# Patient Record
Sex: FEMALE | Race: BLACK OR AFRICAN AMERICAN | Employment: UNEMPLOYED | URBAN - METROPOLITAN AREA
[De-identification: names, ages, dates, MRNs, and addresses within clinical notes are randomized per-mention and may not be internally consistent; named-entity substitution may affect disease eponyms.]

---

## 2017-05-23 ENCOUNTER — HOSPITAL ENCOUNTER (OUTPATIENT)
Dept: GENERAL RADIOLOGY | Age: 67
Discharge: HOME | End: 2017-05-23
Payer: MEDICARE

## 2017-05-23 DIAGNOSIS — R06.02 SHORTNESS OF BREATH: ICD-10-CM

## 2017-05-23 PROCEDURE — 71020 XR CHEST PA LAT: CPT

## 2017-05-26 PROBLEM — E78.5 DYSLIPIDEMIA: Status: ACTIVE | Noted: 2017-05-26

## 2017-05-26 PROBLEM — E66.9 OBESITY: Status: ACTIVE | Noted: 2017-05-26

## 2017-05-26 PROBLEM — R07.89 CHEST DISCOMFORT: Status: ACTIVE | Noted: 2017-05-26

## 2017-07-06 ENCOUNTER — HOSPITAL ENCOUNTER (OUTPATIENT)
Dept: SURGERY | Age: 67
Discharge: HOME | End: 2017-07-06

## 2017-07-06 ENCOUNTER — HOSPITAL ENCOUNTER (OUTPATIENT)
Dept: INTERVENTIONAL RADIOLOGY/VASCULAR | Age: 67
Discharge: HOME | End: 2017-07-06
Attending: SURGERY
Payer: MEDICARE

## 2017-07-06 VITALS
DIASTOLIC BLOOD PRESSURE: 67 MMHG | OXYGEN SATURATION: 94 % | HEART RATE: 91 BPM | TEMPERATURE: 98.1 F | RESPIRATION RATE: 20 BRPM | SYSTOLIC BLOOD PRESSURE: 149 MMHG

## 2017-07-06 VITALS
WEIGHT: 181 LBS | BODY MASS INDEX: 36.49 KG/M2 | HEART RATE: 80 BPM | DIASTOLIC BLOOD PRESSURE: 50 MMHG | SYSTOLIC BLOOD PRESSURE: 148 MMHG | TEMPERATURE: 98 F | OXYGEN SATURATION: 95 % | RESPIRATION RATE: 18 BRPM | HEIGHT: 59 IN

## 2017-07-06 DIAGNOSIS — N18.6 ESRD (END STAGE RENAL DISEASE) (HCC): ICD-10-CM

## 2017-07-06 PROCEDURE — 74011250636 HC RX REV CODE- 250/636: Performed by: RADIOLOGY

## 2017-07-06 PROCEDURE — 77030031131 HC SUT MXN P COVD -B

## 2017-07-06 PROCEDURE — 77030002916 HC SUT ETHLN J&J -A

## 2017-07-06 PROCEDURE — 77030010507 HC ADH SKN DERMBND J&J -B

## 2017-07-06 PROCEDURE — 36558 INSERT TUNNELED CV CATH: CPT

## 2017-07-06 PROCEDURE — 74011000250 HC RX REV CODE- 250: Performed by: RADIOLOGY

## 2017-07-06 PROCEDURE — C1894 INTRO/SHEATH, NON-LASER: HCPCS

## 2017-07-06 PROCEDURE — 77030018719 HC DRSG PTCH ANTIMIC J&J -A

## 2017-07-06 PROCEDURE — C1750 CATH, HEMODIALYSIS,LONG-TERM: HCPCS

## 2017-07-06 RX ORDER — LIDOCAINE HYDROCHLORIDE AND EPINEPHRINE 15; 5 MG/ML; UG/ML
1.5 INJECTION, SOLUTION EPIDURAL ONCE
Status: COMPLETED | OUTPATIENT
Start: 2017-07-06 | End: 2017-07-06

## 2017-07-06 RX ORDER — HEPARIN SODIUM 200 [USP'U]/100ML
1000 INJECTION, SOLUTION INTRAVENOUS CONTINUOUS
Status: DISCONTINUED | OUTPATIENT
Start: 2017-07-06 | End: 2017-07-06

## 2017-07-06 RX ORDER — HEPARIN SODIUM 1000 [USP'U]/ML
1000-8000 INJECTION, SOLUTION INTRAVENOUS; SUBCUTANEOUS ONCE
Status: COMPLETED | OUTPATIENT
Start: 2017-07-06 | End: 2017-07-06

## 2017-07-06 RX ORDER — LIDOCAINE HYDROCHLORIDE 20 MG/ML
.5-2 INJECTION, SOLUTION INFILTRATION; PERINEURAL ONCE
Status: COMPLETED | OUTPATIENT
Start: 2017-07-06 | End: 2017-07-06

## 2017-07-06 RX ADMIN — LIDOCAINE HYDROCHLORIDE 215 MG: 20 INJECTION, SOLUTION INFILTRATION; PERINEURAL at 14:33

## 2017-07-06 RX ADMIN — SODIUM BICARBONATE 2 ML: 0.2 INJECTION, SOLUTION INTRAVENOUS at 14:33

## 2017-07-06 RX ADMIN — HEPARIN SODIUM 3000 UNITS: 1000 INJECTION, SOLUTION INTRAVENOUS; SUBCUTANEOUS at 14:47

## 2017-07-06 RX ADMIN — HEPARIN SODIUM 2000 UNITS: 200 INJECTION, SOLUTION INTRAVENOUS at 14:34

## 2017-07-06 RX ADMIN — LIDOCAINE HYDROCHLORIDE,EPINEPHRINE BITARTRATE 22.5 MG: 15; .005 INJECTION, SOLUTION EPIDURAL; INFILTRATION; INTRACAUDAL; PERINEURAL at 14:40

## 2017-07-06 NOTE — PERIOP NOTES
Patient verified name, , and surgery as listed in [facility]. TYPE  CASE:1B  Orders per surgeon: yes Received  Labs per surgeon:none  Labs per anesthesia protocol: none   EKG  :  Not needed    Patient provided with handouts including guide to surgery , transfusions, pain management and hand hygiene for the family and community. Pt verbalizes understanding of all pre-op instructions . Instructed that family must be present in building at all times. Nothing to eat or drink after midnight the night prior to surgery. One bar reji  Ist soap  and instructions given per hospital policy. Instructed patient to continue  previous medications as prescribed prior to surgery and  to take the following medications the day of surgery according to anesthesia guidelines : albuterol - bring, xanax, clonidine, gabapentin, omeprazole, tramadol       Original medication prescription bottles NOT visualized during patient appointment. Continue all previous medications unless otherwise directed. Instructed patient to hold  the following medications prior to surgery: none       Patient verbalized understanding of all instructions and provided all medical/health information to the best of their ability.

## 2017-07-06 NOTE — PERIOP NOTES
Call to [facility] for most recent ECHO - pt has ECHO rescheduled for 26th. Jeremy Torres to listen to heart  - ECHO  Must be done prior to surgery.

## 2017-07-06 NOTE — PROGRESS NOTES
Recovery period without difficulty. Pt alert and oriented and denies pain. Dressing is clean, dry, and intact. Reviewed discharge instructions with patient and patient aide, both verbalized understanding. Pt escorted to lobby discharge area via wheelchair. Vital signs  completed.

## 2017-07-06 NOTE — IP AVS SNAPSHOT
Sweta Smith 
 
 
 [address] 
[phone] Patient: Nicole Middleton MRN: [id number] WYM:[birth year] You are allergic to the following Allergen Reactions Vancomycin Anaphylaxis Recent Documentation Breastfeeding? OB Status Smoking Status No Postmenopausal Never Smoker Emergency Contacts Name Discharge Info Relation Home Work Mobile Marylene Nixon [5] [phone] About your hospitalization You were admitted on:  July 6, 2017 You last received care in the:  [facility] Radiology Specials You were discharged on:  July 6, 2017 Unit phone number:  [phone] Why you were hospitalized Your primary diagnosis was:  Not on File Providers Seen During Your Hospitalizations Provider Role Specialty Primary office phone Carolina Kauffman MD Attending Provider Vascular Surgery [phone] Your Primary Care Physician (PCP) Primary Care Physician Office Phone Office Fax Dev Ambboy [phone] Follow-up Information None Your Appointments Thursday July 13, 2017 ARTERIO VENOUS GRAFT THIGH REVISION with Carolina Kauffman MD  
[facility] ([address] [place] [address]) [address]  
[phone] Thursday July 27, 2017  9:30 AM EDT  
ECHOCARDIOGRAM with OVI ECHO 26 [facility] ([address] [place] [address]) [address]  
[address]  
[phone] Thursday August 03, 2017  9:30 AM EDT Office Visit with Sherry Hamm MD  
[facility] ([address] [place] [address]) [address] 
[address]  
[phone] Tuesday August 08, 2017 10:15 AM EDT Global Post Op with Carolina Kauffman MD  
[facility] [address] [place] [address] 187 [facility] [address]  
[phone] Current Discharge Medication List  
  
ASK your doctor about these medications Dose & Instructions Dispensing Information Comments Morning Noon Evening Bedtime  
 albuterol 90 mcg/actuation inhaler Commonly known as:  PROAIR HFA Your last dose was: Your next dose is:    
   
   
 Dose:  1 Puff Take 1 Puff by inhalation every six (6) hours as needed for Wheezing or Shortness of Breath. Quantity:  1 Inhaler Refills:  1  
     
   
   
   
  
 cloNIDine HCl 0.2 mg tablet Commonly known as:  CATAPRES Your last dose was: Your next dose is:    
   
   
 Dose:  0.2 mg  
0.2 mg two (2) times a day. Take day of surgery per anesthesia protocol. Refills:  5 DIALYVITE 800 PLUS D PO Your last dose was: Your next dose is: Take  by mouth. Refills:  0  
     
   
   
   
  
 gabapentin 100 mg capsule Commonly known as:  NEURONTIN Your last dose was: Your next dose is:    
   
   
 Dose:  100 mg Take 100 mg by mouth two (2) times a day. Take day of surgery per anesthesia protocol. Indications: \"take it when my nerves are bad\". Refills:  0  
     
   
   
   
  
 losartan 50 mg tablet Commonly known as:  COZAAR Your last dose was: Your next dose is:    
   
   
 Dose:  50 mg Take 50 mg by mouth daily. Indications: hypertension Refills:  0  
     
   
   
   
  
 omeprazole 20 mg capsule Commonly known as:  PRILOSEC Your last dose was: Your next dose is:    
   
   
 Dose:  20 mg Take 20 mg by mouth as needed. Take day of surgery per anesthesia protocol. Refills:  6 RENVELA 800 mg Tab tab Generic drug:  sevelamer carbonate Your last dose was: Your next dose is:    
   
   
 Dose:  800 mg Take 800 mg by mouth three (3) times daily (with meals). Refills:  6 SENSIPAR 30 mg tablet Generic drug:  cinacalcet Your last dose was: Your next dose is:    
   
   
 Dose:  30 mg Take 30 mg by mouth nightly. Refills:  0  
     
   
   
   
  
 traMADol 50 mg tablet Commonly known as:  ULTRAM  
   
Your last dose was: Your next dose is:    
   
   
 Dose:  50 mg Take 50 mg by mouth four (4) times daily as needed for Pain (for leg pain). Refills:  0  
     
   
   
   
  
 XANAX 0.5 mg tablet Generic drug:  ALPRAZolam  
   
Your last dose was: Your next dose is:    
   
   
 Dose:  0.5 mg Take 0.5 mg by mouth three (3) times daily as needed for Anxiety. Take day of surgery per anesthesia protocol. Refills:  0 Discharge Instructions Claire [address] Department of Interventional Radiology [facility] 
[phone] Office 
[phone] Fax Tunneled or Non Tunneled Catheter Discharge Instructions General Information: A catheter, either tunneled (permanent) or non-tunneled (temporary) catheter was inserted into your neck today for the purpose of Cancer treatment (apheresis) or dialysis. Your catheter will be used for the length of your apheresis, or until you get a fistula placed in surgery for dialysis. After this time, your catheter may be removed. You may return to our department for the catheter removal.  A non-tunneled catheter will exit at the neck. There will be three ports, two of which will be used for dialysis or apheresis. The one smaller port in the middle can be used like a regular IV. It ideally is used only for about two weeks. A tunneled catheter will exit lower down on the chest wall, and can be used for a longer period of time. There is no IV port on these. The tunneled catheter is used only for dialysis.   In case of emergencies only can drugs be given through these catheters and only with written permission from your doctor. Home Care Instructions: You can resume your regular diet. Do not drink alcohol, drive, or make any important legal decisions in the next 24 hours. Watch the site carefully for signs of infection, like fever, drainage, redness, and/or swelling. Your catheter should be \"packed\" with heparin after each use or at least once a week if it is not being used. This \"packing\" should only be done by nurses experienced with caring for this type of catheter. You may shower in 24 hours, but you need to cover the catheter with plastic wrap and tape to keep it dry while you are showering. Keep the site clean, dry, and protected. Do not immerse yourself in water like in the case of tub baths or swimming as long as you have the catheter. Call If: 
 You should call your Physician and/or the Radiology Nurse if you have any signs of infection, shortness of breath, or if the dressing should come off or become moist.  You will be instructed on where to go for a new dressing. You should not have to change the dressings yourself, as that will be done by the nurses who access the catheter. Follow-Up Instructions:  Please see your ordering doctor as he/she has requested. To Reach Us: If you have any questions about your procedure, please call the Interventional Radiology department at [phone]. After business hours (5pm) and weekends, call the answering service at [phone] and ask for the Radiologist on call to be paged. Patient Signature: 
Date: 7/6/2017 Discharging Nurse: Alok Abebe RN Interventional Radiology General Nurse Discharge After general anesthesia or intravenous sedation, for 24 hours or while taking prescription Narcotics: · Limit your activities · Do not drive and operate hazardous machinery · Do not make important personal or business decisions · Do  not drink alcoholic beverages · If you have not urinated within 8 hours after discharge, please contact your surgeon on call. * Please give a list of your current medications to your Primary Care Provider. * Please update this list whenever your medications are discontinued, doses are 
   changed, or new medications (including over-the-counter products) are added. * Please carry medication information at all times in case of emergency situations. These are general instructions for a healthy lifestyle: No smoking/ No tobacco products/ Avoid exposure to second hand smoke Surgeon General's Warning:  Quitting smoking now greatly reduces serious risk to your health. Obesity, smoking, and sedentary lifestyle greatly increases your risk for illness A healthy diet, regular physical exercise & weight monitoring are important for maintaining a healthy lifestyle You may be retaining fluid if you have a history of heart failure or if you experience any of the following symptoms:  Weight gain of 3 pounds or more overnight or 5 pounds in a week, increased swelling in our hands or feet or shortness of breath while lying flat in bed. Please call your doctor as soon as you notice any of these symptoms; do not wait until your next office visit. Recognize signs and symptoms of STROKE: 
F-face looks uneven A-arms unable to move or move unevenly S-speech slurred or non-existent T-time-call 911 as soon as signs and symptoms begin-DO NOT go Back to bed or wait to see if you get better-TIME IS BRAIN. Discharge Orders None Introducing \[facility]\"" & HEALTH SERVICES! Jeannie Sun introduces Whisk patient portal. Now you can access parts of your medical record, email your doctor's office, and request medication refills online. 1. In your internet browser, go to [email or web address] 2. Click on the First Time User? Click Here link in the Sign In box. You will see the New Member Sign Up page. 3. Enter your Robot[employer] Access Code exactly as it appears below. You will not need to use this code after youve completed the sign-up process. If you do not sign up before the expiration date, you must request a new code. · Robot[employer] Access Code: [email or web address] Expires: 8/21/2017  2:06 PM 
 
4. Enter the last four digits of your Social Security Number (xxxx) and Date of Birth (mm/dd/yyyy) as indicated and click Submit. You will be taken to the next sign-up page. 5. Create a Robotoki ID. This will be your Robotoki login ID and cannot be changed, so think of one that is secure and easy to remember. 6. Create a Robotoki password. You can change your password at any time. 7. Enter your Password Reset Question and Answer. This can be used at a later time if you forget your password. 8. Enter your e-mail address. You will receive e-mail notification when new information is available in [address] 9. Click Sign Up. You can now view and download portions of your medical record. 10. Click the Download Summary menu link to download a portable copy of your medical information. If you have questions, please visit the Frequently Asked Questions section of the [employer] website. Remember, Robotoki is NOT to be used for urgent needs. For medical emergencies, dial 911. Now available from your iPhone and Android! General Information Please provide this summary of care documentation to your next provider. Patient Signature:  ____________________________________________________________ Date:  ____________________________________________________________  
  
Merline Galaviz Provider Signature:  ____________________________________________________________ Date:  ____________________________________________________________

## 2017-07-06 NOTE — PERIOP NOTES
Blood refusal signed and on chart. Pt reports she has dialysis M W F from 1000 to 1700 and she only has an aide with her TR from [phone], which is a must to have this appointment. LM with surgery scheduling for return call     Speedy Sebastian returned call and said he would call [facility] cardiology to attempt to get sooner appointment with [facility].

## 2017-07-06 NOTE — DISCHARGE INSTRUCTIONS
[address]  Department of Interventional Radiology  [facility]  [phone] Office  [phone] Fax    Tunneled or Non Tunneled Catheter Discharge Instructions    General Information:   A catheter, either tunneled (permanent) or non-tunneled (temporary) catheter was inserted into your neck today for the purpose of Cancer treatment (apheresis) or dialysis. Your catheter will be used for the length of your apheresis, or until you get a fistula placed in surgery for dialysis. After this time, your catheter may be removed. You may return to our department for the catheter removal.  A non-tunneled catheter will exit at the neck. There will be three ports, two of which will be used for dialysis or apheresis. The one smaller port in the middle can be used like a regular IV. It ideally is used only for about two weeks. A tunneled catheter will exit lower down on the chest wall, and can be used for a longer period of time. There is no IV port on these. The tunneled catheter is used only for dialysis. In case of emergencies only can drugs be given through these catheters and only with written permission from your doctor. Home Care Instructions: You can resume your regular diet. Do not drink alcohol, drive, or make any important legal decisions in the next 24 hours. Watch the site carefully for signs of infection, like fever, drainage, redness, and/or swelling. Your catheter should be \"packed\" with heparin after each use or at least once a week if it is not being used. This \"packing\" should only be done by nurses experienced with caring for this type of catheter. You may shower in 24 hours, but you need to cover the catheter with plastic wrap and tape to keep it dry while you are showering. Keep the site clean, dry, and protected. Do not immerse yourself in water like in the case of tub baths or swimming as long as you have the catheter. Call If:   You should call your Physician and/or the Radiology Nurse if you have any signs of infection, shortness of breath, or if the dressing should come off or become moist.  You will be instructed on where to go for a new dressing. You should not have to change the dressings yourself, as that will be done by the nurses who access the catheter. Follow-Up Instructions:  Please see your ordering doctor as he/she has requested. To Reach Us: If you have any questions about your procedure, please call the Interventional Radiology department at [phone]. After business hours (5pm) and weekends, call the answering service at [phone] and ask for the Radiologist on call to be paged. Patient Signature:  Date: 7/6/2017  Discharging Nurse: Ad Yates RN    Interventional Radiology General Nurse Discharge    After general anesthesia or intravenous sedation, for 24 hours or while taking prescription Narcotics:  · Limit your activities  · Do not drive and operate hazardous machinery  · Do not make important personal or business decisions  · Do  not drink alcoholic beverages  · If you have not urinated within 8 hours after discharge, please contact your surgeon on call. * Please give a list of your current medications to your Primary Care Provider. * Please update this list whenever your medications are discontinued, doses are     changed, or new medications (including over-the-counter products) are added. * Please carry medication information at all times in case of emergency situations. These are general instructions for a healthy lifestyle:    No smoking/ No tobacco products/ Avoid exposure to second hand smoke  Surgeon General's Warning:  Quitting smoking now greatly reduces serious risk to your health.     Obesity, smoking, and sedentary lifestyle greatly increases your risk for illness  A healthy diet, regular physical exercise & weight monitoring are important for maintaining a healthy lifestyle    You may be retaining fluid if you have a history of heart failure or if you experience any of the following symptoms:  Weight gain of 3 pounds or more overnight or 5 pounds in a week, increased swelling in our hands or feet or shortness of breath while lying flat in bed. Please call your doctor as soon as you notice any of these symptoms; do not wait until your next office visit. Recognize signs and symptoms of STROKE:  F-face looks uneven    A-arms unable to move or move unevenly    S-speech slurred or non-existent    T-time-call 911 as soon as signs and symptoms begin-DO NOT go       Back to bed or wait to see if you get better-TIME IS BRAIN.

## 2017-07-06 NOTE — PROGRESS NOTES
TRANSFER - OUT REPORT:    Verbal report given to Katia RN(name) on [employer]  being transferred to Ir recovery(unit) for routine progression of care       Report consisted of patients Situation, Background, Assessment and   Recommendations(SBAR). Information from the following report(s) SBAR, Procedure Summary and MAR was reviewed with the receiving nurse. Lines:       Opportunity for questions and clarification was provided.

## 2017-07-06 NOTE — PROGRESS NOTES
TRANSFER - IN REPORT:    Verbal report received from [facility] RN(name) on [employer]  being received from IR(unit) for routine progression of care      Report consisted of patients Situation, Background, Assessment and   Recommendations(SBAR). Information from the following report(s) Procedure Summary and MAR was reviewed with the receiving nurse. Opportunity for questions and clarification was provided. Assessment completed upon patients arrival to unit and care assumed.

## 2017-07-06 NOTE — PERIOP NOTES
Geovanni Durbin call to [facility] to inform he will call [facility] to see if he can get the pt a sooner ECHO

## 2017-08-03 ENCOUNTER — HOSPITAL ENCOUNTER (OUTPATIENT)
Dept: LAB | Age: 67
Discharge: HOME | End: 2017-08-03
Attending: INTERNAL MEDICINE
Payer: MEDICARE

## 2017-08-03 DIAGNOSIS — I06.0 RHEUMATIC AORTIC STENOSIS: ICD-10-CM

## 2017-08-03 DIAGNOSIS — I05.1 RHEUMATIC MITRAL REGURGITATION: ICD-10-CM

## 2017-08-03 PROBLEM — R06.02 SHORTNESS OF BREATH: Status: ACTIVE | Noted: 2017-08-03

## 2017-08-03 LAB
ANION GAP BLD CALC-SCNC: 9 MMOL/L
BASOPHILS # BLD AUTO: 0 K/UL (ref 0–0.2)
BASOPHILS # BLD: 0 % (ref 0–2)
BUN SERPL-MCNC: 30 MG/DL (ref 8–23)
CALCIUM SERPL-MCNC: 10.2 MG/DL (ref 8.3–10.4)
CHLORIDE SERPL-SCNC: 100 MMOL/L (ref 98–107)
CO2 SERPL-SCNC: 30 MMOL/L (ref 21–32)
CREAT SERPL-MCNC: 8.4 MG/DL (ref 0.6–1)
DIFFERENTIAL METHOD BLD: ABNORMAL
EOSINOPHIL # BLD: 0.5 K/UL (ref 0–0.8)
EOSINOPHIL NFR BLD: 8 % (ref 0.5–7.8)
ERYTHROCYTE [DISTWIDTH] IN BLOOD BY AUTOMATED COUNT: 13.9 % (ref 11.9–14.6)
GLUCOSE SERPL-MCNC: 88 MG/DL (ref 65–100)
HCT VFR BLD AUTO: 37.4 % (ref 35.8–46.3)
HGB BLD-MCNC: 12.1 G/DL (ref 11.7–15.4)
INR PPP: 1 (ref 0.9–1.2)
LYMPHOCYTES # BLD AUTO: 24 % (ref 13–44)
LYMPHOCYTES # BLD: 1.4 K/UL (ref 0.5–4.6)
MCH RBC QN AUTO: 28.7 PG (ref 26.1–32.9)
MCHC RBC AUTO-ENTMCNC: 32.4 G/DL (ref 31.4–35)
MCV RBC AUTO: 88.6 FL (ref 79.6–97.8)
MONOCYTES # BLD: 0.7 K/UL (ref 0.1–1.3)
MONOCYTES NFR BLD AUTO: 11 % (ref 4–12)
NEUTS SEG # BLD: 3.4 K/UL (ref 1.7–8.2)
NEUTS SEG NFR BLD AUTO: 57 % (ref 43–78)
PLATELET # BLD AUTO: 112 K/UL (ref 150–450)
PLATELET COMMENTS,PCOM: ABNORMAL
PMV BLD AUTO: 9.2 FL (ref 10.8–14.1)
POTASSIUM SERPL-SCNC: 5 MMOL/L (ref 3.5–5.1)
PROTHROMBIN TIME: 10.3 SEC (ref 9.6–12)
RBC # BLD AUTO: 4.22 M/UL (ref 4.05–5.25)
RBC MORPH BLD: ABNORMAL
SODIUM SERPL-SCNC: 139 MMOL/L (ref 136–145)
WBC # BLD AUTO: 6 K/UL (ref 4.3–11.1)
WBC MORPH BLD: ABNORMAL

## 2017-08-03 PROCEDURE — 85025 COMPLETE CBC W/AUTO DIFF WBC: CPT | Performed by: INTERNAL MEDICINE

## 2017-08-03 PROCEDURE — 85610 PROTHROMBIN TIME: CPT | Performed by: INTERNAL MEDICINE

## 2017-08-03 PROCEDURE — 80048 BASIC METABOLIC PNL TOTAL CA: CPT | Performed by: INTERNAL MEDICINE

## 2017-08-17 ENCOUNTER — HOSPITAL ENCOUNTER (OUTPATIENT)
Dept: CARDIAC CATH/INVASIVE PROCEDURES | Age: 67
Discharge: HOME | End: 2017-08-17
Attending: INTERNAL MEDICINE | Admitting: INTERNAL MEDICINE
Payer: MEDICARE

## 2017-08-17 VITALS
OXYGEN SATURATION: 100 % | SYSTOLIC BLOOD PRESSURE: 116 MMHG | HEIGHT: 59 IN | RESPIRATION RATE: 16 BRPM | DIASTOLIC BLOOD PRESSURE: 56 MMHG | WEIGHT: 180 LBS | HEART RATE: 72 BPM | TEMPERATURE: 97.6 F | BODY MASS INDEX: 36.29 KG/M2

## 2017-08-17 LAB
ATRIAL RATE: 70 BPM
CALCULATED P AXIS, ECG09: 35 DEGREES
CALCULATED R AXIS, ECG10: -24 DEGREES
CALCULATED T AXIS, ECG11: 76 DEGREES
DIAGNOSIS, 93000: NORMAL
P-R INTERVAL, ECG05: 222 MS
Q-T INTERVAL, ECG07: 442 MS
QRS DURATION, ECG06: 90 MS
QTC CALCULATION (BEZET), ECG08: 477 MS
VENTRICULAR RATE, ECG03: 70 BPM

## 2017-08-17 PROCEDURE — C1894 INTRO/SHEATH, NON-LASER: HCPCS

## 2017-08-17 PROCEDURE — 77030002888 HC SUT CHRMC J&J -A

## 2017-08-17 PROCEDURE — 99153 MOD SED SAME PHYS/QHP EA: CPT

## 2017-08-17 PROCEDURE — 77030004559 HC CATH ANGI DX SUPT CARD -B

## 2017-08-17 PROCEDURE — 99152 MOD SED SAME PHYS/QHP 5/>YRS: CPT

## 2017-08-17 PROCEDURE — C1760 CLOSURE DEV, VASC: HCPCS

## 2017-08-17 PROCEDURE — 93460 R&L HRT ART/VENTRICLE ANGIO: CPT

## 2017-08-17 PROCEDURE — C1769 GUIDE WIRE: HCPCS

## 2017-08-17 PROCEDURE — 74011250636 HC RX REV CODE- 250/636: Performed by: INTERNAL MEDICINE

## 2017-08-17 PROCEDURE — 74011000250 HC RX REV CODE- 250: Performed by: INTERNAL MEDICINE

## 2017-08-17 PROCEDURE — 93005 ELECTROCARDIOGRAM TRACING: CPT | Performed by: INTERNAL MEDICINE

## 2017-08-17 PROCEDURE — 74011250637 HC RX REV CODE- 250/637: Performed by: INTERNAL MEDICINE

## 2017-08-17 PROCEDURE — 74011250636 HC RX REV CODE- 250/636

## 2017-08-17 PROCEDURE — 74011636320 HC RX REV CODE- 636/320: Performed by: INTERNAL MEDICINE

## 2017-08-17 PROCEDURE — C1887 CATHETER, GUIDING: HCPCS

## 2017-08-17 PROCEDURE — 77030013687 HC GD NDL BARD -B

## 2017-08-17 PROCEDURE — 74011000258 HC RX REV CODE- 258: Performed by: INTERNAL MEDICINE

## 2017-08-17 PROCEDURE — 77030004534 HC CATH ANGI DX INFN CARD -A

## 2017-08-17 PROCEDURE — 76937 US GUIDE VASCULAR ACCESS: CPT

## 2017-08-17 PROCEDURE — 77030004558 HC CATH ANGI DX SUPR TORQ CARD -A

## 2017-08-17 PROCEDURE — C1751 CATH, INF, PER/CENT/MIDLINE: HCPCS

## 2017-08-17 RX ORDER — HEPARIN SODIUM 200 [USP'U]/100ML
3 INJECTION, SOLUTION INTRAVENOUS CONTINUOUS
Status: DISCONTINUED | OUTPATIENT
Start: 2017-08-17 | End: 2017-08-17 | Stop reason: HOSPADM

## 2017-08-17 RX ORDER — ONDANSETRON 2 MG/ML
4 INJECTION INTRAMUSCULAR; INTRAVENOUS
Status: DISCONTINUED | OUTPATIENT
Start: 2017-08-17 | End: 2017-08-17 | Stop reason: HOSPADM

## 2017-08-17 RX ORDER — ACETAMINOPHEN 325 MG/1
650 TABLET ORAL
Status: DISCONTINUED | OUTPATIENT
Start: 2017-08-17 | End: 2017-08-17 | Stop reason: HOSPADM

## 2017-08-17 RX ORDER — HYDROCODONE BITARTRATE AND ACETAMINOPHEN 5; 325 MG/1; MG/1
1 TABLET ORAL
Status: DISCONTINUED | OUTPATIENT
Start: 2017-08-17 | End: 2017-08-17 | Stop reason: HOSPADM

## 2017-08-17 RX ORDER — FENTANYL CITRATE 50 UG/ML
25-100 INJECTION, SOLUTION INTRAMUSCULAR; INTRAVENOUS
Status: DISCONTINUED | OUTPATIENT
Start: 2017-08-17 | End: 2017-08-17 | Stop reason: HOSPADM

## 2017-08-17 RX ORDER — LIDOCAINE HYDROCHLORIDE 20 MG/ML
1-20 INJECTION, SOLUTION INFILTRATION; PERINEURAL
Status: DISCONTINUED | OUTPATIENT
Start: 2017-08-17 | End: 2017-08-17 | Stop reason: HOSPADM

## 2017-08-17 RX ORDER — SODIUM CHLORIDE 9 MG/ML
75 INJECTION, SOLUTION INTRAVENOUS CONTINUOUS
Status: DISCONTINUED | OUTPATIENT
Start: 2017-08-17 | End: 2017-08-17 | Stop reason: HOSPADM

## 2017-08-17 RX ORDER — SODIUM CHLORIDE 0.9 % (FLUSH) 0.9 %
5-10 SYRINGE (ML) INJECTION EVERY 8 HOURS
Status: DISCONTINUED | OUTPATIENT
Start: 2017-08-17 | End: 2017-08-17 | Stop reason: HOSPADM

## 2017-08-17 RX ORDER — GUAIFENESIN 100 MG/5ML
81-324 LIQUID (ML) ORAL ONCE
Status: COMPLETED | OUTPATIENT
Start: 2017-08-17 | End: 2017-08-17

## 2017-08-17 RX ORDER — SODIUM CHLORIDE 0.9 % (FLUSH) 0.9 %
5-10 SYRINGE (ML) INJECTION AS NEEDED
Status: DISCONTINUED | OUTPATIENT
Start: 2017-08-17 | End: 2017-08-17 | Stop reason: HOSPADM

## 2017-08-17 RX ORDER — DIAZEPAM 5 MG/1
5 TABLET ORAL ONCE
Status: DISCONTINUED | OUTPATIENT
Start: 2017-08-17 | End: 2017-08-17 | Stop reason: HOSPADM

## 2017-08-17 RX ORDER — MIDAZOLAM HYDROCHLORIDE 1 MG/ML
.5-5 INJECTION, SOLUTION INTRAMUSCULAR; INTRAVENOUS
Status: DISCONTINUED | OUTPATIENT
Start: 2017-08-17 | End: 2017-08-17 | Stop reason: HOSPADM

## 2017-08-17 RX ADMIN — HEPARIN SODIUM 3 ML/HR: 200 INJECTION, SOLUTION INTRAVENOUS at 13:06

## 2017-08-17 RX ADMIN — IOPAMIDOL 90 ML: 755 INJECTION, SOLUTION INTRAVENOUS at 13:50

## 2017-08-17 RX ADMIN — MIDAZOLAM HYDROCHLORIDE 1 MG: 1 INJECTION, SOLUTION INTRAMUSCULAR; INTRAVENOUS at 13:48

## 2017-08-17 RX ADMIN — SODIUM CHLORIDE 75 ML/HR: 900 INJECTION, SOLUTION INTRAVENOUS at 12:00

## 2017-08-17 RX ADMIN — FENTANYL CITRATE 25 MCG: 50 INJECTION, SOLUTION INTRAMUSCULAR; INTRAVENOUS at 13:23

## 2017-08-17 RX ADMIN — LIDOCAINE HYDROCHLORIDE 140 MG: 20 INJECTION, SOLUTION INFILTRATION; PERINEURAL at 13:16

## 2017-08-17 RX ADMIN — FENTANYL CITRATE 25 MCG: 50 INJECTION, SOLUTION INTRAMUSCULAR; INTRAVENOUS at 13:40

## 2017-08-17 RX ADMIN — FENTANYL CITRATE 25 MCG: 50 INJECTION, SOLUTION INTRAMUSCULAR; INTRAVENOUS at 13:48

## 2017-08-17 RX ADMIN — MIDAZOLAM HYDROCHLORIDE 2 MG: 1 INJECTION, SOLUTION INTRAMUSCULAR; INTRAVENOUS at 13:23

## 2017-08-17 RX ADMIN — ASPIRIN 81 MG 324 MG: 81 TABLET ORAL at 12:13

## 2017-08-17 RX ADMIN — MIDAZOLAM HYDROCHLORIDE 1 MG: 1 INJECTION, SOLUTION INTRAMUSCULAR; INTRAVENOUS at 13:40

## 2017-08-17 NOTE — PROGRESS NOTES
Report received from Yuliana Prince, Cath Lab RN. Procedural findings communicated. Intra procedural  medication administration reviewed. Progression of care discussed.      Patient received into [address] [facility] 5 post sheath removal. 7fr venous sheath in place    Access site without bleeding or swelling yes    Dressing dry and intact yes    Patient instructed to limit movement to right lower extremity    Routine post procedural vital signs and site assessment initiated yes

## 2017-08-17 NOTE — IP AVS SNAPSHOT
[address] 
[phone] Patient: Kolby Finch MRN: [id number] WN Discharge Summary 2017 Kolby Finch MRN[de-identified]  [id number] Admission Information Provider Pager Service Admission Date Expected D/C Date Shireen Dozier MD  CARDIAC CATH LAB 2017 Actual LOS Patient Class 0 days OUTPATIENT Follow-up Information Follow up With Details Comments Contact Info Deng Thompson MD On 2017 Follow up with Dr. Mcarthur Shown at [facility]iology on  at 1245pm. Please call [phone] if any issues with date or time of appointment. [address] [place] [address] [address] 
[phone] Current Discharge Medication List  
  
ASK your doctor about these medications Dose & Instructions Dispensing Information Comments Morning Noon Evening Bedtime  
 albuterol 90 mcg/actuation inhaler Commonly known as:  PROAIR HFA Your last dose was: Your next dose is:    
   
   
 Dose:  1 Puff Take 1 Puff by inhalation every six (6) hours as needed for Wheezing or Shortness of Breath. Quantity:  1 Inhaler Refills:  1  
     
   
   
   
  
 cloNIDine HCl 0.2 mg tablet Commonly known as:  CATAPRES Your last dose was: Your next dose is:    
   
   
 Dose:  0.2 mg  
0.2 mg two (2) times a day. Refills:  5 DIALYVITE 800 PLUS D PO Your last dose was: Your next dose is: Take  by mouth. Refills:  0  
     
   
   
   
  
 gabapentin 100 mg capsule Commonly known as:  NEURONTIN Your last dose was: Your next dose is:    
   
   
 Dose:  100 mg Take 100 mg by mouth two (2) times a day. Indications: \"take it when my nerves are bad\". Refills:  0  
     
   
   
   
  
 losartan 50 mg tablet Commonly known as:  COZAAR  
   
 Your last dose was: Your next dose is:    
   
   
 Dose:  50 mg Take 50 mg by mouth daily. Indications: hypertension Refills:  0  
     
   
   
   
  
 omeprazole 20 mg capsule Commonly known as:  PRILOSEC Your last dose was: Your next dose is:    
   
   
 Dose:  20 mg Take 20 mg by mouth as needed. Refills:  6 RENVELA 800 mg Tab tab Generic drug:  sevelamer carbonate Your last dose was: Your next dose is:    
   
   
 Dose:  800 mg Take 800 mg by mouth three (3) times daily (with meals). Refills:  6 SENSIPAR 30 mg tablet Generic drug:  cinacalcet Your last dose was: Your next dose is:    
   
   
 Dose:  30 mg Take 30 mg by mouth nightly. Refills:  0  
     
   
   
   
  
 traMADol 50 mg tablet Commonly known as:  ULTRAM  
   
Your last dose was: Your next dose is:    
   
   
 Dose:  50 mg Take 50 mg by mouth four (4) times daily as needed for Pain (for leg pain). Refills:  0  
     
   
   
   
  
 XANAX 0.5 mg tablet Generic drug:  ALPRAZolam  
   
Your last dose was: Your next dose is:    
   
   
 Dose:  0.5 mg Take 0.5 mg by mouth three (3) times daily as needed for Anxiety. Refills:  0 General Information Please provide this summary of care documentation to your next provider. Allergies High:  Vancomycin Current Immunizations  Reviewed on 5/26/2016 Name Date Influenza Vaccine 9/26/2015 Pneumococcal Conjugate (PCV-13) 4/28/2016 TB Skin Test (PPD) Intradermal 5/14/2015 Discharge Instructions Discharge Instructions HEART CATHETERIZATION/ANGIOGRAPHY DISCHARGE INSTRUCTIONS 1. Check puncture site frequently for swelling or bleeding.  If there is any bleeding, lie down and apply pressure over the area with a clean towel or washcloth and call 911. Notify your doctor for any redness, swelling, drainage, or oozing from the puncture site. Notify your doctor for any fever or chills. 2. If the extremity becomes cold, numb, or painful call [facility] at [phone]. 
3. Activity should be limited for the next 48 hours. Climb stairs as little as possible and avoid any stooping, bending, or strenuous activity for 48 hours. No heavy lifting (anything over 10 pounds) for 3 days. 4. You may resume your usual diet. Drink more fluids than usual. 
5. Have a responsible person drive you home and stay with you for at least 24 hours after your heart catheterization/angiography. 6. You may remove bandage from your Right groin in 24 hours. You may shower in 24 hours. No tub baths, hot tubs, or swimming for 1 week. Do not place any lotions, creams, powders, or ointments over puncture site for 1 week. You may place a clean band-aid over the puncture site each day for 5 days. Change daily. Follow up with Dr. Oleg Shaikh at [facility] on August 30th at 1245pm. Please call [phone] if any issues with date or time of appointment. I have read the above instructions and have had the opportunity to ask questions. Patient: ________________________   Date: 8/17/2017 Witness: _______________________   Date: 8/17/2017 Discharge Orders None  
  
` Patient Signature:  ____________________________________________________________ Date:  ____________________________________________________________  
  
 Merline Pane Provider Signature:  ____________________________________________________________ Date:  ____________________________________________________________

## 2017-08-17 NOTE — PROCEDURES
Brief Cardiac Procedure Note    Patient: Adolph Ramirez MRN: [id number]  SSN: [id number]    YOB: 1950  Age: 75 y.o. Sex: female      Date of Procedure: 8/17/2017     Pre-procedure Diagnosis: Aortic Stenosis    Post-procedure Diagnosis: Aortic Stenosis    Procedure: Right and Left Heart Catheterization    Brief Description of Procedure: As above    Performed By: Salbador Jarrett MD     Assistants: None    Anesthesia: Moderate Sedation    Estimated Blood Loss: Less than 10 mL      Specimens: None    Implants: None    Findings: Severe pulmonary HTN. Severe AS. 3+ MR. Distal LAD stenosis. EF 65%. Complications: None    Recommendations: Continue medical therapy.     Signed By: Salbador Jarrett MD     August 17, 2017

## 2017-08-17 NOTE — PROCEDURES
[address]       Name:  Chau Andrade   MR#:  [id number]   :  1950   Account #:  [de-identified]   Date of Adm:  2017       DATE OF PROCEDURE: 2017    PROCEDURES   1. Right heart catheterization with hemodynamic assessment,   pulse oximetry run, and thermodilution cardiac outputs. 2. Left heart catheterization, selective coronary arteriography   and left ventriculogram.    INDICATION: Multivalvular disease with significant aortic   stenosis, mitral regurgitation, tricuspid regurgitation. Significant pulmonary hypertension noted. Preoperative cardiac   catheterization requested by Dr. Darryle Hint. TECHNICAL FACTORS: After informed consent was obtained, the   patient was brought to the cardiac catheterization lab. The   right femoral area was prepped and draped in usual sterile   fashion. Utilizing a [facility] ultrasound, the right common   femoral artery and right common femoral vein were identified. Under ultrasound guidance, a 6-[place] arterial sheath was placed   in the right common femoral artery and a 7-[place]ian venous sheath   was placed in the right common femoral vein. At this point, a   [place]-Stanislav catheter was advanced and ultimately placed in the   wedge position. Hemodynamic assessment, pulse oximetry run, and   thermodilution cardiac outputs were obtained. Pullback gradients   across the pulmonic and tricuspid valves were obtained. At this time, the attention was then turned to the left heart   catheterization. Abdon Paul catheter was used to selectively engage   the ostium of the left main coronary artery and a 3DRC catheter   was used to selectively engage the ostium of the right coronary   artery. Selective injections in various projections were   performed. Pigtail catheter was used to cross the aortic valve   and enter the left ventricle. Hemodynamic measurements and left   ventriculogram were obtained.  Left ventricular aortic pressure   gradient was obtained by pullback technique. CONTRAST: Isovue 90. RIGHT HEART HEMODYNAMICS   1. Right atrial pressure showed an A wave of 13, V wave of 12   with a mean of 11 with a right atrial saturation of 73%. 2. Right ventricular pressure was 78/15 with a right ventricular   saturation of 74%. 3. Pulmonary artery pressure was 78/34 with a mean of 51 with a   pulmonary artery saturation of 73%. 4. Thermodilution cardiac output was 4.93 with a cardiac index   2.8.   5. There was no significant gradient across the pulmonic or   tricuspid valves. LEFT HEART HEMODYNAMICS   1. Aortic pressure was 129/51. 2. Left ventricular end-diastolic pressure was 27.   3. There is a mean gradient across the aortic valve of 53 mmHg   with a calculated aortic valve area of 0.53 cm2 consistent with   severe aortic stenosis. ANGIOGRAPHIC RESULTS:   1. Left main coronary artery: Large caliber vessel. Angiographically normal.   2. LAD: Medium caliber vessel, 20% mid stenosis. There is a   high-grade 80% distal stenosis. 3. First diagonal artery: Small to medium caliber vessel, 20%   proximal stenosis. 4. Second diagonal artery: Small caliber vessel. Patent. 5. Left circumflex: Medium caliber, nondominant vessel. It   contains a 30% mid stenosis. 6. First obtuse marginal artery: Medium caliber. Patent. 7. Right coronary artery: Medium caliber, dominant vessel. Moderately calcified, 30% mid stenosis. 8. Right PDA: Small to medium caliber vessel. Patent. 9 Right posterolateral branch: Medium caliber vessel. Patent. 10. Left ventriculogram performed in [place] projection shows normal   left ventricular systolic function, EF 53%. There is 2-3+ mitral   regurgitation noted. There is mitral annular calcification and   aortic valve calcification. CONCLUSIONS   1. Severe pulmonary hypertension.    2. One vessel coronary artery disease involving the distal left anterior descending. 3. Normal left ventricular systolic function. 4. A 2-3+ mitral regurgitation. 5. Severe pulmonary hypertension. PLAN: I discussed with Dr. Rocco Carlin. We will have her   followup in office in 1-2 weeks to discuss options in regards to   multivalvular surgery and potential bypass to distal LAD.         MD RICH Lema / Dilan Monday   D:  08/17/2017   17:33   T:  08/17/2017   17:56   Job #:  [id number]

## 2017-08-17 NOTE — PROGRESS NOTES
7FR sheath removed from RFV. Manual pressure held for 10 minutes until hemostasis achieved. No bleeding or hematoma noted afterwards. Sterile dressing placed. Pt instructed to keep right leg straight and to remain flat. Pt verbalized understanding of post procedural instructions. Call bell within reach. Will continue to monitor. Hemostasis achieved at 1520.

## 2017-08-17 NOTE — DISCHARGE INSTRUCTIONS
HEART CATHETERIZATION/ANGIOGRAPHY DISCHARGE INSTRUCTIONS    1. Check puncture site frequently for swelling or bleeding. If there is any bleeding, lie down and apply pressure over the area with a clean towel or washcloth and call 911. Notify your doctor for any redness, swelling, drainage, or oozing from the puncture site. Notify your doctor for any fever or chills. 2. If the extremity becomes cold, numb, or painful call [facility] at [phone].  3. Activity should be limited for the next 48 hours. Climb stairs as little as possible and avoid any stooping, bending, or strenuous activity for 48 hours. No heavy lifting (anything over 10 pounds) for 3 days. 4. You may resume your usual diet. Drink more fluids than usual.  5. Have a responsible person drive you home and stay with you for at least 24 hours after your heart catheterization/angiography. 6. You may remove bandage from your Right groin in 24 hours. You may shower in 24 hours. No tub baths, hot tubs, or swimming for 1 week. Do not place any lotions, creams, powders, or ointments over puncture site for 1 week. You may place a clean band-aid over the puncture site each day for 5 days. Change daily. Follow up with Dr. Desirae Senior at [facility] Card[facility] on August 30th at 1245pm. Please call [phone] if any issues with date or time of appointment. I have read the above instructions and have had the opportunity to ask questions.       Patient: ________________________   Date: 8/17/2017    Witness: _______________________   Date: 8/17/2017

## 2017-08-17 NOTE — PROGRESS NOTES
Patient received to [address] room # 5  Via wheelchair from [employer]. Patient scheduled for [facility] today with Dr Mino Fang. Procedure reviewed & questions answered, voiced good understanding consent obtained & placed on chart. All medications and medical history reviewed. Will prep patient per orders. Patient & family updated on plan of care.

## 2017-11-21 ENCOUNTER — HOSPITAL ENCOUNTER (EMERGENCY)
Age: 67
Discharge: HOME | End: 2017-11-21
Attending: EMERGENCY MEDICINE
Payer: MEDICARE

## 2017-11-21 DIAGNOSIS — L03.116 CELLULITIS OF LEFT LOWER EXTREMITY: Primary | ICD-10-CM

## 2017-11-21 LAB
ALBUMIN SERPL-MCNC: 3.1 G/DL (ref 3.2–4.6)
ALBUMIN/GLOB SERPL: 0.6 {RATIO} (ref 1.2–3.5)
ALP SERPL-CCNC: 105 U/L (ref 50–136)
ALT SERPL-CCNC: 33 U/L (ref 12–65)
ANION GAP SERPL CALC-SCNC: 13 MMOL/L (ref 7–16)
AST SERPL-CCNC: 25 U/L (ref 15–37)
BASOPHILS # BLD: 0 K/UL (ref 0–0.2)
BASOPHILS NFR BLD: 0 % (ref 0–2)
BILIRUB SERPL-MCNC: 0.3 MG/DL (ref 0.2–1.1)
BUN SERPL-MCNC: 44 MG/DL (ref 8–23)
CALCIUM SERPL-MCNC: 10.3 MG/DL (ref 8.3–10.4)
CHLORIDE SERPL-SCNC: 98 MMOL/L (ref 98–107)
CO2 SERPL-SCNC: 26 MMOL/L (ref 21–32)
CREAT SERPL-MCNC: 9.88 MG/DL (ref 0.6–1)
DIFFERENTIAL METHOD BLD: ABNORMAL
EOSINOPHIL # BLD: 0.7 K/UL (ref 0–0.8)
EOSINOPHIL NFR BLD: 7 % (ref 0.5–7.8)
ERYTHROCYTE [DISTWIDTH] IN BLOOD BY AUTOMATED COUNT: 13.5 % (ref 11.9–14.6)
GLOBULIN SER CALC-MCNC: 5.4 G/DL (ref 2.3–3.5)
GLUCOSE SERPL-MCNC: 88 MG/DL (ref 65–100)
HCT VFR BLD AUTO: 43.8 % (ref 35.8–46.3)
HGB BLD-MCNC: 14.9 G/DL (ref 11.7–15.4)
IMM GRANULOCYTES # BLD: 0.1 K/UL (ref 0–0.5)
IMM GRANULOCYTES NFR BLD AUTO: 1 % (ref 0–5)
LYMPHOCYTES # BLD: 2.5 K/UL (ref 0.5–4.6)
LYMPHOCYTES NFR BLD: 24 % (ref 13–44)
MCH RBC QN AUTO: 29.2 PG (ref 26.1–32.9)
MCHC RBC AUTO-ENTMCNC: 34 G/DL (ref 31.4–35)
MCV RBC AUTO: 85.9 FL (ref 79.6–97.8)
MONOCYTES # BLD: 1 K/UL (ref 0.1–1.3)
MONOCYTES NFR BLD: 10 % (ref 4–12)
NEUTS SEG # BLD: 6 K/UL (ref 1.7–8.2)
NEUTS SEG NFR BLD: 58 % (ref 43–78)
PLATELET # BLD AUTO: 167 K/UL (ref 150–450)
PMV BLD AUTO: 10.3 FL (ref 10.8–14.1)
POTASSIUM SERPL-SCNC: 5.1 MMOL/L (ref 3.5–5.1)
PROT SERPL-MCNC: 8.5 G/DL (ref 6.3–8.2)
RBC # BLD AUTO: 5.1 M/UL (ref 4.05–5.25)
SODIUM SERPL-SCNC: 137 MMOL/L (ref 136–145)
WBC # BLD AUTO: 10.3 K/UL (ref 4.3–11.1)

## 2017-11-21 PROCEDURE — 99283 EMERGENCY DEPT VISIT LOW MDM: CPT | Performed by: EMERGENCY MEDICINE

## 2017-11-21 PROCEDURE — 74011250637 HC RX REV CODE- 250/637: Performed by: EMERGENCY MEDICINE

## 2017-11-21 PROCEDURE — 80053 COMPREHEN METABOLIC PANEL: CPT | Performed by: EMERGENCY MEDICINE

## 2017-11-21 PROCEDURE — 85025 COMPLETE CBC W/AUTO DIFF WBC: CPT | Performed by: EMERGENCY MEDICINE

## 2017-11-21 PROCEDURE — 36415 COLL VENOUS BLD VENIPUNCTURE: CPT | Performed by: EMERGENCY MEDICINE

## 2017-11-21 RX ORDER — CEPHALEXIN 500 MG/1
500 CAPSULE ORAL 3 TIMES DAILY
Qty: 21 CAP | Refills: 0 | Status: SHIPPED | OUTPATIENT
Start: 2017-11-21 | End: 2017-11-28

## 2017-11-21 RX ORDER — CEPHALEXIN 500 MG/1
500 CAPSULE ORAL 3 TIMES DAILY
Qty: 21 CAP | Refills: 0 | Status: SHIPPED | OUTPATIENT
Start: 2017-11-21 | End: 2017-11-21

## 2017-11-21 RX ORDER — CEPHALEXIN 500 MG/1
500 CAPSULE ORAL
Status: COMPLETED | OUTPATIENT
Start: 2017-11-21 | End: 2017-11-21

## 2017-11-21 RX ADMIN — CEPHALEXIN 500 MG: 500 CAPSULE ORAL at 22:52

## 2017-11-22 VITALS
HEART RATE: 89 BPM | WEIGHT: 175 LBS | DIASTOLIC BLOOD PRESSURE: 69 MMHG | BODY MASS INDEX: 35.28 KG/M2 | HEIGHT: 59 IN | RESPIRATION RATE: 16 BRPM | TEMPERATURE: 98 F | SYSTOLIC BLOOD PRESSURE: 119 MMHG | OXYGEN SATURATION: 98 %

## 2017-11-22 NOTE — ED TRIAGE NOTES
Pt complains of redness pain at dialysis fistula in left leg. States the pain and redness began two days ago.  States she dialyzed yesterday via right subclavian without difficulty

## 2017-11-22 NOTE — ED PROVIDER NOTES
HPI Comments: 54-year-old lady with a history of a graft in her left thigh  That has been evaluated by vascular surgery for probable revision. She currently gets her dialysis through a tunnel catheter in her right chest.  She came in tonight because the left 5 graft site is a little bit red. She is worried that it may be coming infected. She said that it hasn't been very painful although it is tender to the touch. She denies any drainage from it. She says she is seen at cardiologist recently and has been told that she does have some heart problems but she is not currently having any chest pain or difficulty breathing. Elements of this note were created using speech recognition software. As such, errors of speech recognition may be present. Patient is a 75 y.o. female presenting with vascular access problem. The history is provided by the patient. Vascular Access Problem           Past Medical History:   Diagnosis Date    Chronic kidney disease     ESRD    [facility] patient ([facility] [address]     GERD (gastroesophageal reflux disease)     Hyperlipidemia     Hypertension 6/16/2010    Hypertension     Kidney failure     Liver disease     hepatitis C    Obesity     Other ill-defined conditions(799.89)     peripheral neuropathy, Pt states she doesn't have COPD or HTN    Peripheral neuropathy     Psychotic disorder        Past Surgical History:   Procedure Laterality Date    HX OTHER SURGICAL      access--left leg. axcess placed in both upper arms     HX UROLOGICAL      left nephrectomy         Family History:   Problem Relation Age of Onset    Heart Disease Mother     Hypertension Mother     Diabetes Mother     Hypertension Father        Social History     Social History    Marital status:      Spouse name: N/A    Number of children: N/A    Years of education: N/A     Occupational History    Not on file.      Social History Main Topics    Smoking status: Never Smoker    Smokeless tobacco: Never Used    Alcohol use No    Drug use: No      Comment: says even if she did she wouldn't tell me    Sexual activity: Not on file     Other Topics Concern    Not on file     Social History Narrative         ALLERGIES: Vancomycin    Review of Systems   Constitutional: Negative for chills, diaphoresis and fever. HENT: Negative for congestion, rhinorrhea and sore throat. Eyes: Negative for redness and visual disturbance. Respiratory: Negative for cough, chest tightness, shortness of breath and wheezing. Cardiovascular: Negative for chest pain and palpitations. Gastrointestinal: Negative for abdominal pain, blood in stool, diarrhea, nausea and vomiting. Endocrine: Negative for polydipsia and polyuria. Genitourinary: Negative for dysuria and hematuria. Musculoskeletal: Positive for myalgias. Negative for arthralgias and neck stiffness. Skin: Positive for color change. Negative for rash. Allergic/Immunologic: Negative for environmental allergies and food allergies. Neurological: Negative for dizziness, weakness and headaches. Hematological: Negative for adenopathy. Does not bruise/bleed easily. Psychiatric/Behavioral: Negative for confusion and sleep disturbance. The patient is not nervous/anxious. Vitals:    11/21/17 1914   BP: 101/63   Pulse: 100   Resp: 18   Temp: 98.1 °F (36.7 °C)   SpO2: 96%   Weight: 79.4 kg (175 lb)   Height: 4' 11\" (1.499 m)            Physical Exam   Constitutional: She is oriented to person, place, and time. She appears well-developed and well-nourished. Cardiovascular: Normal rate and regular rhythm. No palpable thrill felt over the graft   Musculoskeletal: She exhibits no tenderness or deformity. Neurological: She is alert and oriented to person, place, and time. Skin:   Very mild erythema over a portion of the left thigh graft. Nursing note and vitals reviewed.        MDM  Number of Diagnoses or Management Options  Cellulitis of left lower extremity:   Diagnosis management comments: Patient's blood work is unremarkable except for her kidney function. I will plan to discharge her home with some Keflex and encouraged her to follow up with her vascular surgeon.     ED Course       Procedures

## 2017-11-22 NOTE — DISCHARGE INSTRUCTIONS
Return with any fevers, vomiting, increased redness, worsening symptoms, or additional concerns. Follow-up with your vascular surgeon, Dr. Johan Malcolm, for further care in 5-7 days.

## 2017-11-22 NOTE — ED NOTES
D/c with [facility] via stretcher, pt able to stand and transfer easily and steady. Entiat and drink given to go at pt request. Pt took first dose of oral abx, verbalized understanding of d/c instructions and that her rx has been faxed to [facility], as well as she is going hm with a printed copy of same for just in case needed if faxed fill doesn't happen. Slight redness to LLE around fistula site, same as before. No new or additional pain reported. Pt stable for d/c to home.

## 2018-05-25 ENCOUNTER — ANESTHESIA EVENT (OUTPATIENT)
Dept: SURGERY | Age: 68
DRG: 981 | End: 2018-05-25
Payer: MEDICARE

## 2018-05-28 NOTE — ANESTHESIA PREPROCEDURE EVALUATION
Anesthetic History               Review of Systems / Medical History  Patient summary reviewed    Pulmonary    COPD (By Hx)               Neuro/Psych              Cardiovascular    Hypertension  Valvular problems/murmurs (s/p TAVR 2018): mitral stenosis            Exercise tolerance: <4 METS  Comments: Echo 3/2018: There is mild concentric Left Ventricular Hypertrophy. The Left Ventricular Ejection Fraction is grossly normal.   EF= > 65% . No regional wall motion abnormalities noted. Diastolic dysfunction, grade 1. Mildly dilated LA. The prosthetic TAVR aortic valve is working appropriately   No aortic regurgitation is present. The gradient (mean of 17mm Hg) is acceptable for this TAVR aortic valve. The mitral valve is not well visualized. There is moderate mitral annular calcification. There is mild to moderate mitral stenosis. Evaluation of regurgitation is inadequate.    GI/Hepatic/Renal     GERD  Hepatitis: type C  Renal disease: ESRD and dialysis       Endo/Other        Obesity     Other Findings   Comments: Pt is JW, refuses blood products         Physical Exam    Airway  Mallampati: II  TM Distance: > 6 cm  Neck ROM: normal range of motion   Mouth opening: Normal     Cardiovascular  Regular rate and rhythm,  S1 and S2 normal,  no murmur, click, rub, or gallop             Dental  No notable dental hx       Pulmonary  Breath sounds clear to auscultation               Abdominal         Other Findings            Anesthetic Plan    ASA: 3  Anesthesia type: general            Anesthetic plan and risks discussed with: Patient

## 2018-05-29 ENCOUNTER — HOSPITAL ENCOUNTER (OUTPATIENT)
Age: 68
Setting detail: OUTPATIENT SURGERY
Discharge: HOME | DRG: 981 | End: 2018-05-29
Attending: SURGERY | Admitting: SURGERY
Payer: MEDICARE

## 2018-05-29 ENCOUNTER — ANESTHESIA (OUTPATIENT)
Dept: SURGERY | Age: 68
DRG: 981 | End: 2018-05-29
Payer: MEDICARE

## 2018-05-29 VITALS
OXYGEN SATURATION: 98 % | HEART RATE: 82 BPM | DIASTOLIC BLOOD PRESSURE: 54 MMHG | TEMPERATURE: 98 F | WEIGHT: 185.5 LBS | SYSTOLIC BLOOD PRESSURE: 123 MMHG | RESPIRATION RATE: 16 BRPM | BODY MASS INDEX: 37.4 KG/M2 | HEIGHT: 59 IN

## 2018-05-29 LAB
HGB BLD-MCNC: 13.4 G/DL (ref 11.7–15.4)
POTASSIUM BLD-SCNC: 5.5 MMOL/L (ref 3.5–5.1)

## 2018-05-29 PROCEDURE — 041K0JS BYPASS RIGHT FEMORAL ARTERY TO LOWER EXTREMITY VEIN WITH SYNTHETIC SUBSTITUTE, OPEN APPROACH: ICD-10-PCS | Performed by: SURGERY

## 2018-05-29 PROCEDURE — 76010000108 HC CV SURG 2 TO 2.5 HR: Performed by: SURGERY

## 2018-05-29 PROCEDURE — 74011250636 HC RX REV CODE- 250/636

## 2018-05-29 PROCEDURE — 77030034888 HC SUT PROL 2 J&J -B: Performed by: SURGERY

## 2018-05-29 PROCEDURE — 77030019908 HC STETH ESOPH SIMS -A: Performed by: ANESTHESIOLOGY

## 2018-05-29 PROCEDURE — 76060000035 HC ANESTHESIA 2 TO 2.5 HR: Performed by: SURGERY

## 2018-05-29 PROCEDURE — 77030003029 HC SUT VCRL J&J -B: Performed by: SURGERY

## 2018-05-29 PROCEDURE — 77030011640 HC PAD GRND REM COVD -A: Performed by: SURGERY

## 2018-05-29 PROCEDURE — 74011250636 HC RX REV CODE- 250/636: Performed by: SURGERY

## 2018-05-29 PROCEDURE — 77030008477 HC STYL SATN SLP COVD -A: Performed by: ANESTHESIOLOGY

## 2018-05-29 PROCEDURE — 77030002996 HC SUT SLK J&J -A: Performed by: SURGERY

## 2018-05-29 PROCEDURE — 77030018836 HC SOL IRR NACL ICUM -A: Performed by: SURGERY

## 2018-05-29 PROCEDURE — 74011250636 HC RX REV CODE- 250/636: Performed by: ANESTHESIOLOGY

## 2018-05-29 PROCEDURE — 77030020782 HC GWN BAIR PAWS FLX 3M -B: Performed by: ANESTHESIOLOGY

## 2018-05-29 PROCEDURE — 74011250637 HC RX REV CODE- 250/637: Performed by: ANESTHESIOLOGY

## 2018-05-29 PROCEDURE — C1768 GRAFT, VASCULAR: HCPCS | Performed by: SURGERY

## 2018-05-29 PROCEDURE — 76210000021 HC REC RM PH II 0.5 TO 1 HR: Performed by: SURGERY

## 2018-05-29 PROCEDURE — 76210000006 HC OR PH I REC 0.5 TO 1 HR: Performed by: SURGERY

## 2018-05-29 PROCEDURE — 84132 ASSAY OF SERUM POTASSIUM: CPT

## 2018-05-29 PROCEDURE — 77030018673: Performed by: SURGERY

## 2018-05-29 PROCEDURE — 85018 HEMOGLOBIN: CPT | Performed by: ANESTHESIOLOGY

## 2018-05-29 PROCEDURE — 74011000250 HC RX REV CODE- 250

## 2018-05-29 PROCEDURE — 77030008703 HC TU ET UNCUF COVD -A: Performed by: ANESTHESIOLOGY

## 2018-05-29 PROCEDURE — 77030010512 HC APPL CLP LIG J&J -C: Performed by: SURGERY

## 2018-05-29 DEVICE — IMPLANTABLE DEVICE: Type: IMPLANTABLE DEVICE | Site: LEG | Status: FUNCTIONAL

## 2018-05-29 RX ORDER — SODIUM CHLORIDE, SODIUM LACTATE, POTASSIUM CHLORIDE, CALCIUM CHLORIDE 600; 310; 30; 20 MG/100ML; MG/100ML; MG/100ML; MG/100ML
100 INJECTION, SOLUTION INTRAVENOUS CONTINUOUS
Status: DISCONTINUED | OUTPATIENT
Start: 2018-05-29 | End: 2018-05-29 | Stop reason: HOSPADM

## 2018-05-29 RX ORDER — HYDROCODONE BITARTRATE AND ACETAMINOPHEN 7.5; 325 MG/1; MG/1
1 TABLET ORAL AS NEEDED
Status: DISCONTINUED | OUTPATIENT
Start: 2018-05-29 | End: 2018-05-29 | Stop reason: HOSPADM

## 2018-05-29 RX ORDER — DEXAMETHASONE SODIUM PHOSPHATE 4 MG/ML
INJECTION, SOLUTION INTRA-ARTICULAR; INTRALESIONAL; INTRAMUSCULAR; INTRAVENOUS; SOFT TISSUE AS NEEDED
Status: DISCONTINUED | OUTPATIENT
Start: 2018-05-29 | End: 2018-05-29 | Stop reason: HOSPADM

## 2018-05-29 RX ORDER — SODIUM CHLORIDE 0.9 % (FLUSH) 0.9 %
5-10 SYRINGE (ML) INJECTION AS NEEDED
Status: DISCONTINUED | OUTPATIENT
Start: 2018-05-29 | End: 2018-05-29 | Stop reason: HOSPADM

## 2018-05-29 RX ORDER — FENTANYL CITRATE 50 UG/ML
INJECTION, SOLUTION INTRAMUSCULAR; INTRAVENOUS AS NEEDED
Status: DISCONTINUED | OUTPATIENT
Start: 2018-05-29 | End: 2018-05-29 | Stop reason: HOSPADM

## 2018-05-29 RX ORDER — SODIUM CHLORIDE 9 MG/ML
25 INJECTION, SOLUTION INTRAVENOUS CONTINUOUS
Status: DISCONTINUED | OUTPATIENT
Start: 2018-05-29 | End: 2018-05-29 | Stop reason: HOSPADM

## 2018-05-29 RX ORDER — GLYCOPYRROLATE 0.2 MG/ML
INJECTION INTRAMUSCULAR; INTRAVENOUS AS NEEDED
Status: DISCONTINUED | OUTPATIENT
Start: 2018-05-29 | End: 2018-05-29 | Stop reason: HOSPADM

## 2018-05-29 RX ORDER — ROCURONIUM BROMIDE 10 MG/ML
INJECTION, SOLUTION INTRAVENOUS AS NEEDED
Status: DISCONTINUED | OUTPATIENT
Start: 2018-05-29 | End: 2018-05-29 | Stop reason: HOSPADM

## 2018-05-29 RX ORDER — LIDOCAINE HYDROCHLORIDE 10 MG/ML
0.1 INJECTION INFILTRATION; PERINEURAL AS NEEDED
Status: DISCONTINUED | OUTPATIENT
Start: 2018-05-29 | End: 2018-05-29 | Stop reason: HOSPADM

## 2018-05-29 RX ORDER — HYDROMORPHONE HYDROCHLORIDE 2 MG/ML
0.5 INJECTION, SOLUTION INTRAMUSCULAR; INTRAVENOUS; SUBCUTANEOUS
Status: DISCONTINUED | OUTPATIENT
Start: 2018-05-29 | End: 2018-05-29 | Stop reason: HOSPADM

## 2018-05-29 RX ORDER — ONDANSETRON 2 MG/ML
INJECTION INTRAMUSCULAR; INTRAVENOUS AS NEEDED
Status: DISCONTINUED | OUTPATIENT
Start: 2018-05-29 | End: 2018-05-29 | Stop reason: HOSPADM

## 2018-05-29 RX ORDER — OXYCODONE HYDROCHLORIDE 5 MG/1
5 TABLET ORAL
Status: DISCONTINUED | OUTPATIENT
Start: 2018-05-29 | End: 2018-05-29 | Stop reason: HOSPADM

## 2018-05-29 RX ORDER — NALOXONE HYDROCHLORIDE 0.4 MG/ML
0.1 INJECTION, SOLUTION INTRAMUSCULAR; INTRAVENOUS; SUBCUTANEOUS AS NEEDED
Status: DISCONTINUED | OUTPATIENT
Start: 2018-05-29 | End: 2018-05-29 | Stop reason: HOSPADM

## 2018-05-29 RX ORDER — FAMOTIDINE 20 MG/1
20 TABLET, FILM COATED ORAL ONCE
Status: COMPLETED | OUTPATIENT
Start: 2018-05-29 | End: 2018-05-29

## 2018-05-29 RX ORDER — HEPARIN SODIUM 1000 [USP'U]/ML
INJECTION, SOLUTION INTRAVENOUS; SUBCUTANEOUS AS NEEDED
Status: DISCONTINUED | OUTPATIENT
Start: 2018-05-29 | End: 2018-05-29 | Stop reason: HOSPADM

## 2018-05-29 RX ORDER — SODIUM CHLORIDE 0.9 % (FLUSH) 0.9 %
5-10 SYRINGE (ML) INJECTION EVERY 8 HOURS
Status: DISCONTINUED | OUTPATIENT
Start: 2018-05-29 | End: 2018-05-29 | Stop reason: HOSPADM

## 2018-05-29 RX ORDER — PROTAMINE SULFATE 10 MG/ML
INJECTION, SOLUTION INTRAVENOUS AS NEEDED
Status: DISCONTINUED | OUTPATIENT
Start: 2018-05-29 | End: 2018-05-29 | Stop reason: HOSPADM

## 2018-05-29 RX ORDER — HEPARIN SODIUM 5000 [USP'U]/ML
INJECTION, SOLUTION INTRAVENOUS; SUBCUTANEOUS AS NEEDED
Status: DISCONTINUED | OUTPATIENT
Start: 2018-05-29 | End: 2018-05-29 | Stop reason: HOSPADM

## 2018-05-29 RX ORDER — NEOSTIGMINE METHYLSULFATE 1 MG/ML
INJECTION INTRAVENOUS AS NEEDED
Status: DISCONTINUED | OUTPATIENT
Start: 2018-05-29 | End: 2018-05-29 | Stop reason: HOSPADM

## 2018-05-29 RX ORDER — GUAIFENESIN 100 MG/5ML
81 LIQUID (ML) ORAL ONCE
Status: COMPLETED | OUTPATIENT
Start: 2018-05-29 | End: 2018-05-29

## 2018-05-29 RX ORDER — FENTANYL CITRATE 50 UG/ML
100 INJECTION, SOLUTION INTRAMUSCULAR; INTRAVENOUS ONCE
Status: DISCONTINUED | OUTPATIENT
Start: 2018-05-29 | End: 2018-05-29 | Stop reason: HOSPADM

## 2018-05-29 RX ORDER — LIDOCAINE HYDROCHLORIDE 20 MG/ML
INJECTION, SOLUTION EPIDURAL; INFILTRATION; INTRACAUDAL; PERINEURAL AS NEEDED
Status: DISCONTINUED | OUTPATIENT
Start: 2018-05-29 | End: 2018-05-29 | Stop reason: HOSPADM

## 2018-05-29 RX ORDER — PROPOFOL 10 MG/ML
INJECTION, EMULSION INTRAVENOUS AS NEEDED
Status: DISCONTINUED | OUTPATIENT
Start: 2018-05-29 | End: 2018-05-29 | Stop reason: HOSPADM

## 2018-05-29 RX ORDER — CEFAZOLIN SODIUM/WATER 2 G/20 ML
2 SYRINGE (ML) INTRAVENOUS
Status: COMPLETED | OUTPATIENT
Start: 2018-05-29 | End: 2018-05-29

## 2018-05-29 RX ORDER — MIDAZOLAM HYDROCHLORIDE 1 MG/ML
2 INJECTION, SOLUTION INTRAMUSCULAR; INTRAVENOUS
Status: DISCONTINUED | OUTPATIENT
Start: 2018-05-29 | End: 2018-05-29 | Stop reason: HOSPADM

## 2018-05-29 RX ORDER — ESMOLOL HYDROCHLORIDE 10 MG/ML
INJECTION INTRAVENOUS AS NEEDED
Status: DISCONTINUED | OUTPATIENT
Start: 2018-05-29 | End: 2018-05-29 | Stop reason: HOSPADM

## 2018-05-29 RX ADMIN — NEOSTIGMINE METHYLSULFATE 2 MG: 1 INJECTION INTRAVENOUS at 09:14

## 2018-05-29 RX ADMIN — Medication 2 G: at 07:27

## 2018-05-29 RX ADMIN — GLYCOPYRROLATE 0.2 MG: 0.2 INJECTION INTRAMUSCULAR; INTRAVENOUS at 09:14

## 2018-05-29 RX ADMIN — SODIUM CHLORIDE 25 ML/HR: 900 INJECTION, SOLUTION INTRAVENOUS at 06:19

## 2018-05-29 RX ADMIN — PROTAMINE SULFATE 10 MG: 10 INJECTION, SOLUTION INTRAVENOUS at 08:55

## 2018-05-29 RX ADMIN — FENTANYL CITRATE 50 MCG: 50 INJECTION, SOLUTION INTRAMUSCULAR; INTRAVENOUS at 07:13

## 2018-05-29 RX ADMIN — DEXAMETHASONE SODIUM PHOSPHATE 10 MG: 4 INJECTION, SOLUTION INTRA-ARTICULAR; INTRALESIONAL; INTRAMUSCULAR; INTRAVENOUS; SOFT TISSUE at 07:44

## 2018-05-29 RX ADMIN — PROPOFOL 60 MG: 10 INJECTION, EMULSION INTRAVENOUS at 07:14

## 2018-05-29 RX ADMIN — ESMOLOL HYDROCHLORIDE 30 MG: 10 INJECTION INTRAVENOUS at 09:00

## 2018-05-29 RX ADMIN — PROPOFOL 100 MG: 10 INJECTION, EMULSION INTRAVENOUS at 07:12

## 2018-05-29 RX ADMIN — FENTANYL CITRATE 25 MCG: 50 INJECTION, SOLUTION INTRAMUSCULAR; INTRAVENOUS at 08:56

## 2018-05-29 RX ADMIN — HEPARIN SODIUM 6000 UNITS: 1000 INJECTION, SOLUTION INTRAVENOUS; SUBCUTANEOUS at 08:00

## 2018-05-29 RX ADMIN — PROTAMINE SULFATE 10 MG: 10 INJECTION, SOLUTION INTRAVENOUS at 08:57

## 2018-05-29 RX ADMIN — HYDROMORPHONE HYDROCHLORIDE 0.5 MG: 2 INJECTION, SOLUTION INTRAMUSCULAR; INTRAVENOUS; SUBCUTANEOUS at 10:17

## 2018-05-29 RX ADMIN — PROTAMINE SULFATE 10 MG: 10 INJECTION, SOLUTION INTRAVENOUS at 08:54

## 2018-05-29 RX ADMIN — ROCURONIUM BROMIDE 30 MG: 10 INJECTION, SOLUTION INTRAVENOUS at 07:14

## 2018-05-29 RX ADMIN — ROCURONIUM BROMIDE 10 MG: 10 INJECTION, SOLUTION INTRAVENOUS at 07:28

## 2018-05-29 RX ADMIN — FENTANYL CITRATE 25 MCG: 50 INJECTION, SOLUTION INTRAMUSCULAR; INTRAVENOUS at 08:43

## 2018-05-29 RX ADMIN — ASPIRIN 81 MG 81 MG: 81 TABLET ORAL at 06:56

## 2018-05-29 RX ADMIN — PROTAMINE SULFATE 10 MG: 10 INJECTION, SOLUTION INTRAVENOUS at 08:56

## 2018-05-29 RX ADMIN — FENTANYL CITRATE 50 MCG: 50 INJECTION, SOLUTION INTRAMUSCULAR; INTRAVENOUS at 09:24

## 2018-05-29 RX ADMIN — FENTANYL CITRATE 50 MCG: 50 INJECTION, SOLUTION INTRAMUSCULAR; INTRAVENOUS at 07:12

## 2018-05-29 RX ADMIN — FAMOTIDINE 20 MG: 20 TABLET ORAL at 06:20

## 2018-05-29 RX ADMIN — HYDROCODONE BITARTRATE AND ACETAMINOPHEN 1 TABLET: 7.5; 325 TABLET ORAL at 09:51

## 2018-05-29 RX ADMIN — ESMOLOL HYDROCHLORIDE 30 MG: 10 INJECTION INTRAVENOUS at 09:22

## 2018-05-29 RX ADMIN — ESMOLOL HYDROCHLORIDE 30 MG: 10 INJECTION INTRAVENOUS at 07:16

## 2018-05-29 RX ADMIN — LIDOCAINE HYDROCHLORIDE 100 MG: 20 INJECTION, SOLUTION EPIDURAL; INFILTRATION; INTRACAUDAL; PERINEURAL at 07:12

## 2018-05-29 RX ADMIN — ONDANSETRON 4 MG: 2 INJECTION INTRAMUSCULAR; INTRAVENOUS at 09:02

## 2018-05-29 NOTE — OP NOTES
[address] Sofy Perez  MR#: [id number]  : 1950  ACCOUNT #: [de-identified]   DATE OF SERVICE: 2018    CLINICAL SERVICE:  Vascular surgery    PREOPERATIVE DIAGNOSIS:  End-stage renal disease, needing long-term dialysis access. POSTOPERATIVE DIAGNOSIS:  End-stage renal disease, needing long-term dialysis access. SURGICAL PROCEDURE:  Creation of right thigh arteriovenous graft. ATTENDING SURGEON:  Alejandro Ace MD    ASSISTANT:  *    ANESTHESIA:  General.    COMPLICATIONS:  None. SPECIMENS:  None. ESTIMATED BLOOD LOSS:  50 mL. IMPLANTS:      INDICATION FOR PROCEDURE:  This is a 69-year-old female with history of chronic end-stage renal disease who has exhausted all her upper extremity including a left thigh graft. She presented for a right thigh graft placement. She underwent vein mapping which showed she had diffuse SFA disease, however, patent, and her vein was of usable size. PROCEDURE IN DETAIL:  After giving informed consent, the patient was brought to the operating room. General anesthesia was then induced. Preop antibiotics were given before skin incision. The patient's right thigh was then prepped and draped in normal sterile fashion. A vertical incision was made below the distal crease. We dissected down through the subcutaneous tissue and the fascia. The SFA and the vein was then identified and controlled with vessel loops for about 5 cm. The artery had diffusely atherosclerotic disease, and the vein was probably about 6-7 mm. We then made a counterincision just above the knee. We dissected down through subcutaneous tissue. We brought a tunneler, and we tunneled subcutaneously with a bullet. Brought to the field a 6 x 50 PTFE graft. We then oriented the graft. The vein was medial and the artery was lateral.  We then heparinized the patient. We got proximal and distal control of the vein.   An 11 blade for a venotomy, extended with Epps scissors. We spatulated the vein into end-to-side anastomosis using 5-0 Prolene suture. Prior to our anastomosis, we back flushed and forward flushed the graft. There was good outflow bleeding. We then cut the graft to appropriate size and made an arteriotomy on the anterior part of the SFA. There was diffusely atherosclerotic disease; however, the lumen was patent. We did an end-to-side anastomosis using 6-0 Prolene sutures. We then forward flushed and back flushed the graft. There was a good Doppler thrill in the graft and also one distal to the vein that augmented when we occluded the graft. We then reversed the patient with 40 mg of protamine. We held pressure. We closed all wounds with 3-0 Vicryl for the distal and 4-0 Monocryl, and the groin incision was closed with 2-0 Vicryl, 3-0 Vicryl and 4-0 Monocryl. The patient was extubated and transferred to the PACU in stable condition.       MD DERIK King Do / SOPHIE  D: 05/29/2018 09:39     T: 05/29/2018 10:15  JOB #: [id number]

## 2018-05-29 NOTE — BRIEF OP NOTE
[address]   [address] [facility] FAX: [phone]    Brief Op Note Template Note    Pre-Op Diagnosis: End-stage renal disease ([facility] [address] [N18.6]    Post-Op Diagnosis:  End-stage renal disease ([facility] [address] [N18.6]    Procedures: Procedure(s):  RIGHT THIGH ARTERIO VENOUS GRAFT     Surgeon: Jacinto Gabriel MD    Assistants: Surgeon(s):  Jacinto Gabriel MD      Anesthesia:  General     Findings: Heavily diseased right SFA patent, 650 PTFE graft    Tourniquet Time:  * No tourniquets in log *    Estimated Blood Loss:               Specimens:           Implants:    Implant Name Type Inv. Item Serial No.  Lot No. LRB No. Used Action   GRAFT VASC N-S STD WL [id number] -- [employer] - [id number]   GRAFT VASC N-S STD WL [id number] -- Alveria Height [id number]  [employer]   Right 1 Implanted       Complications: None               Signed: Jacinto Gabriel MD      Elements of this note have been dictated using speech recognition software. As a result, errors of speech recognition may have occurred.

## 2018-05-29 NOTE — DISCHARGE INSTRUCTIONS
Hemodialysis Access: What to Expect at 39 [facility]  Hemodialysis is a way to remove wastes from the blood when your kidneys can no longer do the job. It is not a cure, but it can help you live longer and feel better. It is a lifesaving treatment when you have kidney failure. Hemodialysis is often called dialysis. Your doctor created a place (called an access) in your arm for your blood to flow in and out of your body during your dialysis sessions. Your arm will probably be bruised and swollen. It may hurt. The cut (incision) may bleed. The pain and bleeding will get better over several days. You will probably need only over-the-counter pain medicine. You can reduce swelling by propping your arm on 1 or 2 pillows and keeping your elbow straight. You will have stitches. These may dissolve on their own, or your doctor will tell you when to come in to have them removed. You should also be able to return to work in a few days. You may feel some coolness or numbness in your hand. These feelings usually go away in a few weeks. Your doctor may suggest squeezing a soft object. This will strengthen your access and may make hemodialysis faster and easier. You should always be able to feel blood rushing through the fistula or graft. It feels like a slight vibration when you put your fingers on the skin over the fistula or graft. This feeling is called a thrill or pulse. This care sheet gives you a general idea about how long it will take for you to recover. But each person recovers at a different pace. Follow the steps below to get better as quickly as possible. How can you care for yourself at home? Activity  ? · Rest when you feel tired. Getting enough sleep will help you recover. Do not lie on or sleep on the arm with the access. ? · Avoid activities such as washing windows or gardening that put stress on the arm with the access.    ? · You may use your arm, but do not lift anything that weighs more than about 15 pounds. This may include a child, heavy grocery bags, a heavy briefcase or backpack, cat litter or dog food bags, or a vacuum . ? · You can shower, but keep the access dry for the first 2 days. Cover the area with a plastic bag to keep it dry. ? · Do not soak or scrub the incision until it has healed. ? · Wear an arm guard to protect the area if you play sports or work with your arms. ? · You may drive when your doctor says it is okay. This is usually in 1 to 2 days. ? · Most people are able to return to work about 1 or 2 days after surgery. Diet  ? · Follow an eating plan that is good for your kidneys. A registered dietitian can help you make a meal plan that is right for you. You may need to limit protein, salt, fluids, and certain foods. Medicines  ? · Your doctor will tell you if and when you can restart your medicines. He or she will also give you instructions about taking any new medicines. ? · If you take blood thinners, such as warfarin (Coumadin), clopidogrel (Plavix), or aspirin, be sure to talk to your doctor. He or she will tell you if and when to start taking those medicines again. Make sure that you understand exactly what your doctor wants you to do. ? · Take pain medicines exactly as directed. ¨ If the doctor gave you a prescription medicine for pain, take it as prescribed. ¨ If you are not taking a prescription pain medicine, ask your doctor if you can take acetaminophen (Tylenol). Do not take ibuprofen (Advil, Motrin) or naproxen (Aleve), or similar medicines, unless your doctor tells you to. They may make chronic kidney disease worse. ¨ Do not take two or more pain medicines at the same time unless the doctor told you to. Many pain medicines have acetaminophen, which is Tylenol. Too much acetaminophen (Tylenol) can be harmful.    ? · If you think your pain medicine is making you sick to your stomach:  ¨ Take your medicine after meals (unless your doctor has told you not to). ¨ Ask your doctor for a different pain medicine. ? · If your doctor prescribed antibiotics, take them as directed. Do not stop taking them just because you feel better. You need to take the full course of antibiotics. Incision care  ? · Keep the area dry for 2 days. After 2 days, wash the area with soap and water every day, and always before dialysis. ? · Do not soak or scrub the incision until it has healed. ? · If you have a bandage, change it every day or as your doctor recommends. Your doctor will tell you when you can remove it. Exercise  ? · Squeeze a soft ball or other object as your doctor tells you. This will help blood flow through the access and help prevent blood clots. ? Elevation  ? · Prop up the sore arm on a pillow anytime you sit or lie down during the next 3 days. Try to keep it above the level of your heart. This will help reduce swelling. Other instructions  ? · Every day, check your access for a pulse or thrill in the fistula or graft area. A thrill is a vibration. To feel a pulse or thrill, place the first two fingers of your hand over the access. ? · Do not bump your arm. ? · Do not wear tight clothing, jewelry, or anything else that may squeeze the access. ? · Use your other arm to have blood drawn or blood pressure taken. ? · Do not put cream or lotion on or near the access. ? · Make sure all doctors you deal with know you have a vascular access. Follow-up care is a key part of your treatment and safety. Be sure to make and go to all appointments, and call your doctor if you are having problems. It's also a good idea to know your test results and keep a list of the medicines you take. When should you call for help? Call 911 anytime you think you may need emergency care. For example, call if:  ? · You passed out (lost consciousness). ? · You have chest pain, are short of breath, or cough up blood.    ?Call your doctor now or seek immediate medical care if:  ? · Your hand or arm is cold or dark-colored. ? · You have no pulse in your access. ? · You have nausea or you vomit. ? · You have pain that does not get better after you take pain medicine. ? · You have loose stitches, or your incision comes open. ? · You are bleeding from the incision. ? · You have signs of infection, such as:  ¨ Increased pain, swelling, warmth, or redness. ¨ Red streaks leading from the area. ¨ Pus draining from the area. ¨ A fever. ? · You have signs of a blood clot in your leg (called a deep vein thrombosis), such as:  ¨ Pain in your calf, back of the knee, thigh, or groin. ¨ Redness or swelling in your leg. ? Watch closely for changes in your health, and be sure to contact your doctor if you have any problems. Where can you learn more? Go to http://madeline-flash.info/. Enter P616 in the search box to learn more about \"Hemodialysis Access: What to Expect at Home. \"  Current as of: May 12, 2017  Content Version: 11.4  © 0417-4415 [employer]. Care instructions adapted under license by [employer] (which disclaims liability or warranty for this information). If you have questions about a medical condition or this instruction, always ask your healthcare professional. [address] any warranty or liability for your use of this information. After general anesthesia or intravenous sedation, for 24 hours or while taking prescription Narcotics:  · Limit your activities  · Do not drive and operate hazardous machinery  · Do not make important personal or business decisions  · Do  not drink alcoholic beverages  · If you have not urinated within 8 hours after discharge, please contact your surgeon on call. *  Please give a list of your current medications to your Primary Care Provider.   *  Please update this list whenever your medications are discontinued, doses are      changed, or new medications (including over-the-counter products) are added. *  Please carry medication information at all times in case of emergency situations. These are general instructions for a healthy lifestyle:  No smoking/ No tobacco products/ Avoid exposure to second hand smoke  Surgeon General's Warning:  Quitting smoking now greatly reduces serious risk to your health. Obesity, smoking, and sedentary lifestyle greatly increases your risk for illness  A healthy diet, regular physical exercise & weight monitoring are important for maintaining a healthy lifestyle    You may be retaining fluid if you have a history of heart failure or if you experience any of the following symptoms:  Weight gain of 3 pounds or more overnight or 5 pounds in a week, increased swelling in our hands or feet or shortness of breath while lying flat in bed. Please call your doctor as soon as you notice any of these symptoms; do not wait until your next office visit. Recognize signs and symptoms of STROKE:  F-face looks uneven  A-arms unable to move or move unevenly  S-speech slurred or non-existent  T-time-call 911 as soon as signs and symptoms begin-DO NOT go       Back to bed or wait to see if you get better-TIME IS BRAIN.

## 2018-05-29 NOTE — IP AVS SNAPSHOT
Rhiannon Blanchardn 
 
 
 [address] [facility] [address] 
[phone] Patient: Wily Giron MRN: [id number] About your hospitalization You were admitted on:  May 29, 2018 [facility] You were discharged on:  May 29, 2018 Why you were hospitalized Your primary diagnosis was:  Not on File Follow-up Information Follow up With Details Comments Contact Info Lawson Duran MD   [address] [address] 
[phone] Your Scheduled Appointments Tuesday June 12, 2018  9:15 AM EDT  
ESTABLISHED PATIENT with Olivia Noland MD  
[facility] VASCULAR SURGERY ([facility]) [address] [id number] [phone] Tuesday June 26, 2018 10:15 AM EDT Global Post Op with Olivia Noland MD  
[facility] VASCULAR SURGERY ([facility]) [address] [id number] [phone] Discharge Orders None A check karlene indicates which time of day the medication should be taken. My Medications CHANGE how you take these medications Instructions Each Dose to Equal  
 Morning Noon Evening Bedtime  
 albuterol 90 mcg/actuation inhaler Commonly known as:  PROAIR HFA What changed:  additional instructions Your last dose was: Your next dose is: Take 1 Puff by inhalation every six (6) hours as needed for Wheezing or Shortness of Breath. 1 Puff CONTINUE taking these medications Instructions Each Dose to Equal  
 Morning Noon Evening Bedtime  
 aspirin delayed-release 81 mg tablet Your last dose was: Your next dose is: Take 81 mg by mouth daily. 81 mg  
    
   
   
   
  
 cloNIDine HCl 0.2 mg tablet Commonly known as:  CATAPRES  
   
 Your last dose was: Your next dose is:    
   
   
 0.2 mg two (2) times a day. 0.2 mg  
    
   
   
   
  
 DIALYVITE 800 PLUS D PO Your last dose was: Your next dose is: Take  by mouth.  
     
   
   
   
  
 gabapentin 100 mg capsule Commonly known as:  NEURONTIN Your last dose was: Your next dose is: Take 100 mg by mouth two (2) times a day. Indications: \"take it when my nerves are bad\". 100 mg  
    
   
   
   
  
 losartan 50 mg tablet Commonly known as:  COZAAR Your last dose was: Your next dose is: Take 50 mg by mouth daily. Indications: hypertension 50 mg  
    
   
   
   
  
 omeprazole 20 mg capsule Commonly known as:  PRILOSEC Your last dose was: Your next dose is: Take 20 mg by mouth as needed. 20 mg  
    
   
   
   
  
 PLAVIX 75 mg Tab Generic drug:  clopidogrel Your last dose was: Your next dose is: Take 75 mg by mouth daily. 75 mg RENVELA 800 mg Tab tab Generic drug:  sevelamer carbonate Your last dose was: Your next dose is: Take 800 mg by mouth three (3) times daily (with meals). 800 mg SENSIPAR 30 mg tablet Generic drug:  cinacalcet Your last dose was: Your next dose is: Take 30 mg by mouth nightly. 30 mg  
    
   
   
   
  
 traMADol 50 mg tablet Commonly known as:  ULTRAM  
   
Your last dose was: Your next dose is: Take 50 mg by mouth four (4) times daily as needed for Pain (for leg pain). 50 mg  
    
   
   
   
  
 XANAX 0.5 mg tablet Generic drug:  ALPRAZolam  
   
Your last dose was: Your next dose is: Take 0.5 mg by mouth three (3) times daily as needed for Anxiety. 0.5 mg  
    
   
   
   
  
  
  
  
Opioid Education Prescription Opioids: What You Need to Know: 
 
Prescription opioids can be used to help relieve moderate-to-severe pain and are often prescribed following a surgery or injury, or for certain health conditions. These medications can be an important part of treatment but also come with serious risks. Opioids are strong pain medicines. Examples include hydrocodone, oxycodone, fentanyl, and morphine. Heroin is an example of an illegal opioid. It is important to work with your health care provider to make sure you are getting the safest, most effective care. WHAT ARE THE RISKS AND SIDE EFFECTS OF OPIOID USE? Prescription opioids carry serious risks of addiction and overdose, especially with prolonged use. An opioid overdose, often marked by slow breathing, can cause sudden death. The use of prescription opioids can have a number of side effects as well, even when taken as directed. · Tolerance-meaning you might need to take more of a medication for the same pain relief · Physical dependence-meaning you have symptoms of withdrawal when the medication is stopped. Withdrawal symptoms can include nausea, sweating, chills, diarrhea, stomach cramps, and muscle aches. Withdrawal can last up to several weeks, depending on which drug you took and how long you took it. · Increased sensitivity to pain · Constipation · Nausea, vomiting, and dry mouth · Sleepiness and dizziness · Confusion · Depression · Low levels of testosterone that can result in lower sex drive, energy, and strength · Itching and sweating RISKS ARE GREATER WITH:      
· History of drug misuse, substance use disorder, or overdose · Mental health conditions (such as depression or anxiety) · Sleep apnea · Older age (72 years or older) · Pregnancy Avoid alcohol while taking prescription opioids. Also, unless specifically advised by your health care provider, medications to avoid include: · Benzodiazepines (such as Xanax or Valium) · Muscle relaxants (such as Soma or Flexeril) · Hypnotics (such as Ambien or Lunesta) · Other prescription opioids KNOW YOUR OPTIONS Talk to your health care provider about ways to manage your pain that don't involve prescription opioids. Some of these options may actually work better and have fewer risks and side effects. Options may include: 
· Pain relievers such as acetaminophen, ibuprofen, and naproxen · Some medications that are also used for depression or seizures · Physical therapy and exercise · Counseling to help patients learn how to cope better with triggers of pain and stress. · Application of heat or cold compress · Massage therapy · Relaxation techniques Be Informed Make sure you know the name of your medication, how much and how often to take it, and its potential risks & side effects. IF YOU ARE PRESCRIBED OPIOIDS FOR PAIN: 
· Never take opioids in greater amounts or more often than prescribed. Remember the goal is not to be pain-free but to manage your pain at a tolerable level. · Follow up with your primary care provider to: · Work together to create a plan on how to manage your pain. · Talk about ways to help manage your pain that don't involve prescription opioids. · Talk about any and all concerns and side effects. · Help prevent misuse and abuse. · Never sell or share prescription opioids · Help prevent misuse and abuse. · Store prescription opioids in a secure place and out of reach of others (this may include visitors, children, friends, and family). · Safely dispose of unused/unwanted prescription opioids: Find your community drug take-back program or your pharmacy mail-back program, or flush them down the toilet, following guidance from the Food and Drug Administration (www.fda.gov/Drugs/ResourcesForYou). · Visit www.cdc.gov/drugoverdose to learn about the risks of opioid abuse and overdose. · If you believe you may be struggling with addiction, tell your health care provider and ask for guidance or call Meliza Powell at 3-616-672-PVFH. Discharge Instructions Hemodialysis Access: What to Expect at [facility] Your Recovery Hemodialysis is a way to remove wastes from the blood when your kidneys can no longer do the job. It is not a cure, but it can help you live longer and feel better. It is a lifesaving treatment when you have kidney failure. Hemodialysis is often called dialysis. Your doctor created a place (called an access) in your arm for your blood to flow in and out of your body during your dialysis sessions. Your arm will probably be bruised and swollen. It may hurt. The cut (incision) may bleed. The pain and bleeding will get better over several days. You will probably need only over-the-counter pain medicine. You can reduce swelling by propping your arm on 1 or 2 pillows and keeping your elbow straight. You will have stitches. These may dissolve on their own, or your doctor will tell you when to come in to have them removed. You should also be able to return to work in a few days. You may feel some coolness or numbness in your hand. These feelings usually go away in a few weeks. Your doctor may suggest squeezing a soft object. This will strengthen your access and may make hemodialysis faster and easier. You should always be able to feel blood rushing through the fistula or graft. It feels like a slight vibration when you put your fingers on the skin over the fistula or graft. This feeling is called a thrill or pulse. This care sheet gives you a general idea about how long it will take for you to recover. But each person recovers at a different pace. Follow the steps below to get better as quickly as possible. How can you care for yourself at home? Activity ? · Rest when you feel tired. Getting enough sleep will help you recover. Do not lie on or sleep on the arm with the access. ? · Avoid activities such as washing windows or gardening that put stress on the arm with the access. ? · You may use your arm, but do not lift anything that weighs more than about 15 pounds. This may include a child, heavy grocery bags, a heavy briefcase or backpack, cat litter or dog food bags, or a vacuum . ? · You can shower, but keep the access dry for the first 2 days. Cover the area with a plastic bag to keep it dry. ? · Do not soak or scrub the incision until it has healed. ? · Wear an arm guard to protect the area if you play sports or work with your arms. ? · You may drive when your doctor says it is okay. This is usually in 1 to 2 days. ? · Most people are able to return to work about 1 or 2 days after surgery. Diet ? · Follow an eating plan that is good for your kidneys. A registered dietitian can help you make a meal plan that is right for you. You may need to limit protein, salt, fluids, and certain foods. Medicines ? · Your doctor will tell you if and when you can restart your medicines. He or she will also give you instructions about taking any new medicines. ? · If you take blood thinners, such as warfarin (Coumadin), clopidogrel (Plavix), or aspirin, be sure to talk to your doctor. He or she will tell you if and when to start taking those medicines again. Make sure that you understand exactly what your doctor wants you to do. ? · Take pain medicines exactly as directed. ¨ If the doctor gave you a prescription medicine for pain, take it as prescribed. ¨ If you are not taking a prescription pain medicine, ask your doctor if you can take acetaminophen (Tylenol). Do not take ibuprofen (Advil, Motrin) or naproxen (Aleve), or similar medicines, unless your doctor tells you to. They may make chronic kidney disease worse. ¨ Do not take two or more pain medicines at the same time unless the doctor told you to. Many pain medicines have acetaminophen, which is Tylenol. Too much acetaminophen (Tylenol) can be harmful. ? · If you think your pain medicine is making you sick to your stomach: 
¨ Take your medicine after meals (unless your doctor has told you not to). ¨ Ask your doctor for a different pain medicine. ? · If your doctor prescribed antibiotics, take them as directed. Do not stop taking them just because you feel better. You need to take the full course of antibiotics. Incision care ? · Keep the area dry for 2 days. After 2 days, wash the area with soap and water every day, and always before dialysis. ? · Do not soak or scrub the incision until it has healed. ? · If you have a bandage, change it every day or as your doctor recommends. Your doctor will tell you when you can remove it. Exercise ? · Squeeze a soft ball or other object as your doctor tells you. This will help blood flow through the access and help prevent blood clots. ? Elevation ? · Prop up the sore arm on a pillow anytime you sit or lie down during the next 3 days. Try to keep it above the level of your heart. This will help reduce swelling. Other instructions ? · Every day, check your access for a pulse or thrill in the fistula or graft area. A thrill is a vibration. To feel a pulse or thrill, place the first two fingers of your hand over the access. ? · Do not bump your arm. ? · Do not wear tight clothing, jewelry, or anything else that may squeeze the access. ? · Use your other arm to have blood drawn or blood pressure taken. ? · Do not put cream or lotion on or near the access. ? · Make sure all doctors you deal with know you have a vascular access. Follow-up care is a key part of your treatment and safety.  Be sure to make and go to all appointments, and call your doctor if you are having problems. It's also a good idea to know your test results and keep a list of the medicines you take. When should you call for help? Call 911 anytime you think you may need emergency care. For example, call if: 
? · You passed out (lost consciousness). ? · You have chest pain, are short of breath, or cough up blood. ?Call your doctor now or seek immediate medical care if: 
? · Your hand or arm is cold or dark-colored. ? · You have no pulse in your access. ? · You have nausea or you vomit. ? · You have pain that does not get better after you take pain medicine. ? · You have loose stitches, or your incision comes open. ? · You are bleeding from the incision. ? · You have signs of infection, such as: 
¨ Increased pain, swelling, warmth, or redness. ¨ Red streaks leading from the area. ¨ Pus draining from the area. ¨ A fever. ? · You have signs of a blood clot in your leg (called a deep vein thrombosis), such as: 
¨ Pain in your calf, back of the knee, thigh, or groin. ¨ Redness or swelling in your leg. ? Watch closely for changes in your health, and be sure to contact your doctor if you have any problems. Where can you learn more? Go to http://madeline-flash.info/. Enter P616 in the search box to learn more about \"Hemodialysis Access: What to Expect at Home. \" Current as of: May 12, 2017 Content Version: 11.4 © 8837-0306 Healthwise, Incorporated. Care instructions adapted under license by [employer] (which disclaims liability or warranty for this information). If you have questions about a medical condition or this instruction, always ask your healthcare professional. [place] [address] any warranty or liability for your use of this information. After general anesthesia or intravenous sedation, for 24 hours or while taking prescription Narcotics: · Limit your activities · Do not drive and operate hazardous machinery · Do not make important personal or business decisions · Do  not drink alcoholic beverages · If you have not urinated within 8 hours after discharge, please contact your surgeon on call. *  Please give a list of your current medications to your Primary Care Provider. *  Please update this list whenever your medications are discontinued, doses are 
    changed, or new medications (including over-the-counter products) are added. *  Please carry medication information at all times in case of emergency situations. These are general instructions for a healthy lifestyle: No smoking/ No tobacco products/ Avoid exposure to second hand smoke Surgeon General's Warning:  Quitting smoking now greatly reduces serious risk to your health. Obesity, smoking, and sedentary lifestyle greatly increases your risk for illness A healthy diet, regular physical exercise & weight monitoring are important for maintaining a healthy lifestyle You may be retaining fluid if you have a history of heart failure or if you experience any of the following symptoms:  Weight gain of 3 pounds or more overnight or 5 pounds in a week, increased swelling in our hands or feet or shortness of breath while lying flat in bed. Please call your doctor as soon as you notice any of these symptoms; do not wait until your next office visit. Recognize signs and symptoms of STROKE: 
F-face looks uneven A-arms unable to move or move unevenly S-speech slurred or non-existent T-time-call 911 as soon as signs and symptoms begin-DO NOT go Back to bed or wait to see if you get better-TIME IS BRAIN. ACO Transitions of Care [facility] offers a voluntary care coordination program to provide high quality service and care to [facility] fee-for-service beneficiaries.   
 
[employer] was designed to help you enhance your health and well-being through the following services: ? Transitions of Care  support for individuals who are transitioning from one care setting to another (example: Hospital to home). ? Chronic and Complex Care Coordination  support for individuals and caregivers of those with serious or chronic illnesses or with more than one chronic (ongoing) condition and those who take a number of different medications. If you meet specific medical criteria, a [facility] may call you directly to coordinate your care with your primary care physician and your other care providers. For questions about the [facility] programs, please, contact your physicians office. For general questions or additional information about Accountable Care Organizations: 
Please visit www.medicare.gov/acos. html or call 1-800-MEDICARE (8-373.603.7187) TTY users should call 0-822.194.8461. Introducing [facility]! [employer] introduces [employer] patient portal. Now you can access parts of your medical record, email your doctor's office, and request medication refills online. 1. In your internet browser, go to [email or web address] 2. Click on the First Time User? Click Here link in the Sign In box. You will see the New Member Sign Up page. 3. Enter your [employer] Access Code exactly as it appears below. You will not need to use this code after youve completed the sign-up process. If you do not sign up before the expiration date, you must request a new code. · [employer] Access Code: [email or web address] Expires: 5/29/2018 10:41 AM 
 
4. Enter the last four digits of your Social Security Number (xxxx) and Date of Birth (mm/dd/yyyy) as indicated and click Submit. You will be taken to the next sign-up page. 5. Create a [employer] ID. This will be your [employer] login ID and cannot be changed, so think of one that is secure and easy to remember. 6. Create a Hstry password. You can change your password at any time. 7. Enter your Password Reset Question and Answer. This can be used at a later time if you forget your password. 8. Enter your e-mail address. You will receive e-mail notification when new information is available in [address] 9. Click Sign Up. You can now view and download portions of your medical record. 10. Click the Download Summary menu link to download a portable copy of your medical information. If you have questions, please visit the Frequently Asked Questions section of the H[employer] website. Remember, [employer] is NOT to be used for urgent needs. For medical emergencies, dial 911. Now available from your iPhone and Android! Introducing Gilberto Tao As a [employer] patient, I wanted to make you aware of our electronic visit tool called Gilberto Tao. [employer] 24/7 allows you to connect within minutes with a medical provider 24 hours a day, seven days a week via a mobile device or tablet or logging into a secure website from your computer. You can access Gilberto Tao from anywhere in the [place]. A virtual visit might be right for you when you have a simple condition and feel like you just dont want to get out of bed, or cant get away from work for an appointment, when your regular [employer] provider is not available (evenings, weekends or holidays), or when youre out of town and need minor care. Electronic visits cost only $49 and if the [employer] 24/7 provider determines a prescription is needed to treat your condition, one can be electronically transmitted to a nearby pharmacy*. Please take a moment to enroll today if you have not already done so. The enrollment process is free and takes just a few minutes. To enroll, please download the [employer] 24/7 ollie to your tablet or phone, or visit [email or web address] to enroll on your computer. And, as an [address] [place] [address] patient with a [email or web address] account, the results of your visits will be scanned into your electronic medical record and your primary care provider will be able to view the scanned results. We urge you to continue to see your regular [facility] provider for your ongoing medical care. And while your primary care provider may not be the one available when you seek a Gilberto Cordonharoldofin virtual visit, the peace of mind you get from getting a real diagnosis real time can be priceless. For more information on Gilberto Tao, view our Frequently Asked Questions (FAQs) at [email or web address]. Sincerely, 
 
Nelli Pimentel MD 
Chief Medical Officer [employer] *:  certain medications cannot be prescribed via Gilberto Cordonritesh Providers Seen During Your Hospitalization Provider Specialty Primary office phone Saray Panchal MD Vascular Surgery [phone] Your Primary Care Physician (PCP) Primary Care Physician Office Phone Office Fax Jimmy Hussein [phone] You are allergic to the following Allergen Reactions Vancomycin Anaphylaxis Recent Documentation Height Weight BMI OB Status Smoking Status 1.499 m 84.1 kg 37.47 kg/m2 Postmenopausal Never Smoker Emergency Contacts Name Discharge Info Relation Home Work Mobile [facility])  Brother [24] [phone] Steve Simon [5] Patient Belongings The following personal items are in your possession at time of discharge: 
  Dental Appliances: None  Visual Aid: Glasses (reading glasses)      Home Medications: None   Jewelry: None  Clothing: Pajamas, Socks, Footwear    Other Valuables: Purse Please provide this summary of care documentation to your next provider.  
  
  
 
  
Signatures-by signing, you are acknowledging that this After Visit Summary has been reviewed with you and you have received a copy. Patient Signature:  ____________________________________________________________ Date:  ____________________________________________________________  
  
Jaylen Captain Provider Signature:  ____________________________________________________________ Date:  ____________________________________________________________

## 2018-05-29 NOTE — PERIOP NOTES
Dr. Jiame Kingsley at bedside to assess patient. Advised of POC potassium results and that patient has not had ASA in three days.

## 2018-05-29 NOTE — ANESTHESIA POSTPROCEDURE EVALUATION
Post-Anesthesia Evaluation and Assessment    Patient: Christine Olivier MRN: [id number]  SSN: [id number]    YOB: 1950  Age: 70 y.o. Sex: female       Cardiovascular Function/Vital Signs  Visit Vitals    /59    Pulse 68    Temp 36.7 °C (98 °F)    Resp 16    Ht 4' 11\" (1.499 m)    Wt 84.1 kg (185 lb 8 oz)    SpO2 96%    BMI 37.47 kg/m2       Patient is status post general anesthesia for Procedure(s):  RIGHT THIGH ARTERIO VENOUS GRAFT . Nausea/Vomiting: None    Postoperative hydration reviewed and adequate. Pain:  Pain Scale 1: Numeric (0 - 10) (05/29/18 0954)  Pain Intensity 1: 4 (05/29/18 0954)   Managed    Neurological Status:   Neuro (WDL): Within Defined Limits (05/29/18 0954)   At baseline    Mental Status and Level of Consciousness: Arousable    Pulmonary Status:   O2 Device: Room air (05/29/18 0954)   Adequate oxygenation and airway patent    Complications related to anesthesia: None    Post-anesthesia assessment completed.  No concerns    Signed By: Maribel Redd MD     May 29, 2018

## 2018-06-01 ENCOUNTER — APPOINTMENT (OUTPATIENT)
Dept: GENERAL RADIOLOGY | Age: 68
DRG: 981 | End: 2018-06-01
Attending: EMERGENCY MEDICINE
Payer: MEDICARE

## 2018-06-01 ENCOUNTER — HOSPITAL ENCOUNTER (INPATIENT)
Age: 68
LOS: 4 days | Discharge: HOME | DRG: 981 | End: 2018-06-05
Attending: EMERGENCY MEDICINE | Admitting: FAMILY MEDICINE
Payer: MEDICARE

## 2018-06-01 DIAGNOSIS — E87.5 ACUTE HYPERKALEMIA: Primary | ICD-10-CM

## 2018-06-01 PROBLEM — E66.9 OBESITY: Chronic | Status: ACTIVE | Noted: 2017-05-26

## 2018-06-01 PROBLEM — E66.01 CLASS 2 SEVERE OBESITY DUE TO EXCESS CALORIES WITH SERIOUS COMORBIDITY IN ADULT (HCC): Chronic | Status: ACTIVE | Noted: 2017-05-26

## 2018-06-01 PROBLEM — R06.02 SHORTNESS OF BREATH: Status: RESOLVED | Noted: 2017-08-03 | Resolved: 2018-06-01

## 2018-06-01 PROBLEM — R07.89 CHEST DISCOMFORT: Status: RESOLVED | Noted: 2017-05-26 | Resolved: 2018-06-01

## 2018-06-01 LAB
ANION GAP SERPL CALC-SCNC: 15 MMOL/L (ref 7–16)
ATRIAL RATE: 83 BPM
BASOPHILS # BLD: 0 K/UL (ref 0–0.2)
BASOPHILS NFR BLD: 0 % (ref 0–2)
BUN SERPL-MCNC: 67 MG/DL (ref 8–23)
CALCIUM SERPL-MCNC: 8.8 MG/DL (ref 8.3–10.4)
CALCULATED P AXIS, ECG09: 62 DEGREES
CALCULATED R AXIS, ECG10: -19 DEGREES
CALCULATED T AXIS, ECG11: 69 DEGREES
CHLORIDE SERPL-SCNC: 99 MMOL/L (ref 98–107)
CK SERPL-CCNC: 102 U/L (ref 21–215)
CO2 SERPL-SCNC: 23 MMOL/L (ref 21–32)
CREAT SERPL-MCNC: 15.1 MG/DL (ref 0.6–1)
DIAGNOSIS, 93000: NORMAL
DIFFERENTIAL METHOD BLD: ABNORMAL
EOSINOPHIL # BLD: 0.2 K/UL (ref 0–0.8)
EOSINOPHIL NFR BLD: 3 % (ref 0.5–7.8)
ERYTHROCYTE [DISTWIDTH] IN BLOOD BY AUTOMATED COUNT: 14.5 % (ref 11.9–14.6)
GLUCOSE SERPL-MCNC: 78 MG/DL (ref 65–100)
HCT VFR BLD AUTO: 34.3 % (ref 35.8–46.3)
HGB BLD-MCNC: 11.1 G/DL (ref 11.7–15.4)
IMM GRANULOCYTES # BLD: 0 K/UL (ref 0–0.5)
IMM GRANULOCYTES NFR BLD AUTO: 0 % (ref 0–5)
LACTATE BLD-SCNC: 2 MMOL/L (ref 0.5–1.9)
LYMPHOCYTES # BLD: 1.7 K/UL (ref 0.5–4.6)
LYMPHOCYTES NFR BLD: 23 % (ref 13–44)
MAGNESIUM SERPL-MCNC: 3.1 MG/DL (ref 1.8–2.4)
MCH RBC QN AUTO: 28 PG (ref 26.1–32.9)
MCHC RBC AUTO-ENTMCNC: 32.4 G/DL (ref 31.4–35)
MCV RBC AUTO: 86.6 FL (ref 79.6–97.8)
MONOCYTES # BLD: 0.7 K/UL (ref 0.1–1.3)
MONOCYTES NFR BLD: 10 % (ref 4–12)
NEUTS SEG # BLD: 4.7 K/UL (ref 1.7–8.2)
NEUTS SEG NFR BLD: 64 % (ref 43–78)
P-R INTERVAL, ECG05: 200 MS
PLATELET # BLD AUTO: 142 K/UL (ref 150–450)
PMV BLD AUTO: 10.4 FL (ref 10.8–14.1)
POTASSIUM SERPL-SCNC: 7.6 MMOL/L (ref 3.5–5.1)
Q-T INTERVAL, ECG07: 356 MS
QRS DURATION, ECG06: 98 MS
QTC CALCULATION (BEZET), ECG08: 418 MS
RBC # BLD AUTO: 3.96 M/UL (ref 4.05–5.25)
SODIUM SERPL-SCNC: 137 MMOL/L (ref 136–145)
TROPONIN I BLD-MCNC: 0.02 NG/ML (ref 0.02–0.05)
VENTRICULAR RATE, ECG03: 83 BPM
WBC # BLD AUTO: 7.4 K/UL (ref 4.3–11.1)

## 2018-06-01 PROCEDURE — 83735 ASSAY OF MAGNESIUM: CPT | Performed by: EMERGENCY MEDICINE

## 2018-06-01 PROCEDURE — 96375 TX/PRO/DX INJ NEW DRUG ADDON: CPT | Performed by: EMERGENCY MEDICINE

## 2018-06-01 PROCEDURE — 74011000258 HC RX REV CODE- 258: Performed by: EMERGENCY MEDICINE

## 2018-06-01 PROCEDURE — 77030013140 HC MSK NEB VYRM -A

## 2018-06-01 PROCEDURE — 80048 BASIC METABOLIC PNL TOTAL CA: CPT | Performed by: EMERGENCY MEDICINE

## 2018-06-01 PROCEDURE — 84484 ASSAY OF TROPONIN QUANT: CPT

## 2018-06-01 PROCEDURE — 65610000001 HC ROOM ICU GENERAL

## 2018-06-01 PROCEDURE — 74011250637 HC RX REV CODE- 250/637: Performed by: FAMILY MEDICINE

## 2018-06-01 PROCEDURE — 74011636637 HC RX REV CODE- 636/637: Performed by: EMERGENCY MEDICINE

## 2018-06-01 PROCEDURE — 99285 EMERGENCY DEPT VISIT HI MDM: CPT | Performed by: EMERGENCY MEDICINE

## 2018-06-01 PROCEDURE — 93005 ELECTROCARDIOGRAM TRACING: CPT | Performed by: EMERGENCY MEDICINE

## 2018-06-01 PROCEDURE — 96361 HYDRATE IV INFUSION ADD-ON: CPT | Performed by: EMERGENCY MEDICINE

## 2018-06-01 PROCEDURE — 71045 X-RAY EXAM CHEST 1 VIEW: CPT

## 2018-06-01 PROCEDURE — 96374 THER/PROPH/DIAG INJ IV PUSH: CPT | Performed by: EMERGENCY MEDICINE

## 2018-06-01 PROCEDURE — 83605 ASSAY OF LACTIC ACID: CPT

## 2018-06-01 PROCEDURE — 74011250636 HC RX REV CODE- 250/636: Performed by: EMERGENCY MEDICINE

## 2018-06-01 PROCEDURE — 74011250636 HC RX REV CODE- 250/636: Performed by: FAMILY MEDICINE

## 2018-06-01 PROCEDURE — 94640 AIRWAY INHALATION TREATMENT: CPT

## 2018-06-01 PROCEDURE — 74011000250 HC RX REV CODE- 250: Performed by: EMERGENCY MEDICINE

## 2018-06-01 PROCEDURE — 82550 ASSAY OF CK (CPK): CPT | Performed by: EMERGENCY MEDICINE

## 2018-06-01 PROCEDURE — 85025 COMPLETE CBC W/AUTO DIFF WBC: CPT | Performed by: EMERGENCY MEDICINE

## 2018-06-01 RX ORDER — BISACODYL 5 MG
5 TABLET, DELAYED RELEASE (ENTERIC COATED) ORAL DAILY PRN
Status: DISCONTINUED | OUTPATIENT
Start: 2018-06-01 | End: 2018-06-05 | Stop reason: HOSPADM

## 2018-06-01 RX ORDER — LOSARTAN POTASSIUM 50 MG/1
50 TABLET ORAL DAILY
Status: DISCONTINUED | OUTPATIENT
Start: 2018-06-02 | End: 2018-06-02

## 2018-06-01 RX ORDER — CLOPIDOGREL BISULFATE 75 MG/1
75 TABLET ORAL DAILY
Status: DISCONTINUED | OUTPATIENT
Start: 2018-06-02 | End: 2018-06-05 | Stop reason: HOSPADM

## 2018-06-01 RX ORDER — SODIUM CHLORIDE 0.9 % (FLUSH) 0.9 %
5-10 SYRINGE (ML) INJECTION EVERY 8 HOURS
Status: DISCONTINUED | OUTPATIENT
Start: 2018-06-01 | End: 2018-06-05 | Stop reason: HOSPADM

## 2018-06-01 RX ORDER — HEPARIN SODIUM 5000 [USP'U]/ML
5000 INJECTION, SOLUTION INTRAVENOUS; SUBCUTANEOUS EVERY 8 HOURS
Status: DISCONTINUED | OUTPATIENT
Start: 2018-06-01 | End: 2018-06-05 | Stop reason: HOSPADM

## 2018-06-01 RX ORDER — NALOXONE HYDROCHLORIDE 0.4 MG/ML
0.4 INJECTION, SOLUTION INTRAMUSCULAR; INTRAVENOUS; SUBCUTANEOUS AS NEEDED
Status: DISCONTINUED | OUTPATIENT
Start: 2018-06-01 | End: 2018-06-05 | Stop reason: HOSPADM

## 2018-06-01 RX ORDER — ALPRAZOLAM 0.5 MG/1
0.5 TABLET ORAL
Status: DISCONTINUED | OUTPATIENT
Start: 2018-06-01 | End: 2018-06-05 | Stop reason: HOSPADM

## 2018-06-01 RX ORDER — DEXTROSE 50 % IN WATER (D50W) INTRAVENOUS SYRINGE
50
Status: COMPLETED | OUTPATIENT
Start: 2018-06-01 | End: 2018-06-01

## 2018-06-01 RX ORDER — SODIUM CHLORIDE 0.9 % (FLUSH) 0.9 %
5-10 SYRINGE (ML) INJECTION AS NEEDED
Status: DISCONTINUED | OUTPATIENT
Start: 2018-06-01 | End: 2018-06-05 | Stop reason: HOSPADM

## 2018-06-01 RX ORDER — ACETAMINOPHEN 325 MG/1
650 TABLET ORAL
Status: DISCONTINUED | OUTPATIENT
Start: 2018-06-01 | End: 2018-06-05 | Stop reason: HOSPADM

## 2018-06-01 RX ORDER — ASPIRIN 81 MG/1
81 TABLET ORAL DAILY
Status: DISCONTINUED | OUTPATIENT
Start: 2018-06-02 | End: 2018-06-05 | Stop reason: HOSPADM

## 2018-06-01 RX ORDER — ALBUTEROL SULFATE 0.83 MG/ML
5 SOLUTION RESPIRATORY (INHALATION)
Status: COMPLETED | OUTPATIENT
Start: 2018-06-01 | End: 2018-06-01

## 2018-06-01 RX ORDER — PROCHLORPERAZINE EDISYLATE 5 MG/ML
5 INJECTION INTRAMUSCULAR; INTRAVENOUS
Status: DISCONTINUED | OUTPATIENT
Start: 2018-06-01 | End: 2018-06-05 | Stop reason: HOSPADM

## 2018-06-01 RX ORDER — DIPHENHYDRAMINE HCL 25 MG
25 CAPSULE ORAL
Status: DISCONTINUED | OUTPATIENT
Start: 2018-06-01 | End: 2018-06-05 | Stop reason: HOSPADM

## 2018-06-01 RX ORDER — CLONIDINE HYDROCHLORIDE 0.2 MG/1
0.2 TABLET ORAL 2 TIMES DAILY
Status: DISCONTINUED | OUTPATIENT
Start: 2018-06-01 | End: 2018-06-02

## 2018-06-01 RX ORDER — CINACALCET 30 MG/1
30 TABLET, FILM COATED ORAL
Status: DISCONTINUED | OUTPATIENT
Start: 2018-06-01 | End: 2018-06-05 | Stop reason: HOSPADM

## 2018-06-01 RX ORDER — TRAMADOL HYDROCHLORIDE 50 MG/1
50 TABLET ORAL
Status: DISCONTINUED | OUTPATIENT
Start: 2018-06-01 | End: 2018-06-05 | Stop reason: HOSPADM

## 2018-06-01 RX ORDER — SEVELAMER HYDROCHLORIDE 400 MG/1
800 TABLET, FILM COATED ORAL 3 TIMES DAILY
Status: DISCONTINUED | OUTPATIENT
Start: 2018-06-01 | End: 2018-06-05 | Stop reason: HOSPADM

## 2018-06-01 RX ORDER — HYDROCODONE BITARTRATE AND ACETAMINOPHEN 10; 325 MG/1; MG/1
1 TABLET ORAL
Status: DISCONTINUED | OUTPATIENT
Start: 2018-06-01 | End: 2018-06-05 | Stop reason: HOSPADM

## 2018-06-01 RX ADMIN — HEPARIN SODIUM 5000 UNITS: 5000 INJECTION, SOLUTION INTRAVENOUS; SUBCUTANEOUS at 22:37

## 2018-06-01 RX ADMIN — DEXTROSE MONOHYDRATE 25 G: 25 INJECTION, SOLUTION INTRAVENOUS at 17:55

## 2018-06-01 RX ADMIN — HUMAN INSULIN 5 UNITS: 100 INJECTION, SOLUTION SUBCUTANEOUS at 17:42

## 2018-06-01 RX ADMIN — Medication 10 ML: at 21:40

## 2018-06-01 RX ADMIN — ALBUTEROL SULFATE 5 MG: 2.5 SOLUTION RESPIRATORY (INHALATION) at 17:06

## 2018-06-01 RX ADMIN — SODIUM CHLORIDE 500 ML: 900 INJECTION, SOLUTION INTRAVENOUS at 16:58

## 2018-06-01 RX ADMIN — CALCIUM CHLORIDE 0.3 G: 100 INJECTION INTRAVENOUS; INTRAVENTRICULAR at 19:44

## 2018-06-01 NOTE — CONSULTS
[facility] CONSULT NOTE     Admission Date:  6/1/2018    Admission Diagnosis:  There are no admission diagnoses documented for this encounter. History of Present Illness:    Patient is a 70year old female patient with ESRD of almost 20 years presenting with altered mental status. She had a new R thigh AVG placed on Tuesday and for some reason has not been to dialysis since Monday. She reports her transport did not pick her up either Wed or Fri, but she seems slightly confused and question her reliability as a historian. Labs revealed hyperkalemia and EKG with peaked T waves. She is mildly hypotensive. Currently, she denies chest pain or SOB. She reports that she hasn't been taking her medications although unclear as to reasoning. She typically dialyzes at [facility] on MWF via Perm[facility]. Review of Systems:  Gen: no weakness or change in appetite  Neuro: no dizziness  CV: No chest pain or palpitations  Resp: no cough or SOB  Abdomen: No diarrhea, abdominal pain, or N/V  Genitourinary: No change in urine output  Musculoskeletal: +erythema at R thigh AVG site        Past Medical History:   Diagnosis Date    Chronic kidney disease     ESRD    [facility]ialysis patient ([facility] [address]     GERD (gastroesophageal reflux disease)     Hyperlipidemia     Hypertension 6/16/2010    Hypertension     Kidney failure     Liver disease     hepatitis C    Obesity     Other ill-defined conditions(799.89)     peripheral neuropathy, Pt states she doesn't have COPD or HTN    Peripheral neuropathy     Psychotic disorder       Past Surgical History:   Procedure Laterality Date    HX OTHER SURGICAL      access--left leg.   axcess placed in both upper arms     HX UROLOGICAL      left nephrectomy      Current Facility-Administered Medications   Medication Dose Route Frequency    calcium chloride 0.3 g in 0.9% sodium chloride 100 mL IVPB  0.3 g IntraVENous ONCE     Current Outpatient Prescriptions   Medication Sig    aspirin delayed-release 81 mg tablet Take 81 mg by mouth daily.  clopidogrel (PLAVIX) 75 mg tab Take 75 mg by mouth daily.  VIT B COMP C/FOLIC ACID/VIT D3 (DIALYVITE 800 PLUS D PO) Take  by mouth.  losartan (COZAAR) 50 mg tablet Take 50 mg by mouth daily. Indications: hypertension    omeprazole (PRILOSEC) 20 mg capsule Take 20 mg by mouth as needed.  cloNIDine HCl (CATAPRES) 0.2 mg tablet 0.2 mg two (2) times a day.  RENVELA 800 mg tab tab Take 800 mg by mouth three (3) times daily (with meals).  albuterol (PROAIR HFA) 90 mcg/actuation inhaler Take 1 Puff by inhalation every six (6) hours as needed for Wheezing or Shortness of Breath. (Patient taking differently: Take 1 Puff by inhalation every six (6) hours as needed for Wheezing or Shortness of Breath. Take day of surgery per anesthesia protocol. Bring DOS)    ALPRAZolam (XANAX) 0.5 mg tablet Take 0.5 mg by mouth three (3) times daily as needed for Anxiety.  traMADol (ULTRAM) 50 mg tablet Take 50 mg by mouth four (4) times daily as needed for Pain (for leg pain).  gabapentin (NEURONTIN) 100 mg capsule Take 100 mg by mouth two (2) times a day. Indications: \"take it when my nerves are bad\".  cinacalcet (SENSIPAR) 30 mg tablet Take 30 mg by mouth nightly.      Allergies   Allergen Reactions    Vancomycin Anaphylaxis      Social History   Substance Use Topics    Smoking status: Never Smoker    Smokeless tobacco: Never Used    Alcohol use No      Family History   Problem Relation Age of Onset    Heart Disease Mother     Hypertension Mother     Diabetes Mother     Hypertension Father               Objective:  Vitals:    06/01/18 1618 06/01/18 1647 06/01/18 1651 06/01/18 1706   BP: 94/51 126/55 119/55    Pulse: 86 86 85    Resp: 18  26    Temp: 99.5 °F (37.5 °C)      SpO2: 94% 92% 98% 98%   Weight: 81.6 kg (180 lb)      Height: 4' 11\" (1.499 m)        No intake or output data in the 24 hours ending 06/01/18 1906  Wt Readings from Last 3 Encounters:   06/01/18 81.6 kg (180 lb)   05/29/18 84.1 kg (185 lb 8 oz)   05/25/18 81.6 kg (180 lb)       GEN - in no distress, alert but seems slightly confused  Neck - trachea midline  CV - S1, S2 +murmur  Lung - clear bilaterally, lungs expand symmetrically  Chest wall - normal appearance  Abd - soft, nontender  Ext - 1+ BLE  edema  Neurologic - nonfocal  Genitourinary - bladder nonpalpable  Access: R thigh AVG +bruit with diffuse edema and erythema extending into thigh// PermCath site looks OK      Data Review:     Recent Labs      06/01/18   1659   WBC  7.4   HGB  11.1*   HCT  34.3*   PLT  142*        Recent Labs      06/01/18   1659   NA  137   K  7.6*   CL  99   CO2  23   BUN  67*   CREA  15.10*   CA  8.8   GLU  78   MG  3.1*         Assessment/Plan:     Active Problems:    * No active hospital problems. *        -- ESRD: Missed HD since Monday and now hyperkalemic. We have asked hospitalists to admit and appreciate their assistance. She will be admitted to ICU so that she can run HD tonight. -- HyperKalemia: Treated with d50/ insulin. Calcium gluconate ordered. -- s/p new R thigh AVG: monitor site  -- Hypotension: No UF tonight. .. BPs currently stable in 90s. Monitor. Patient d/w Dr Coral Elizalde. Assessment and plan as per Dr Coral Elizalde.      OLIVER Amezquitac

## 2018-06-01 NOTE — IP AVS SNAPSHOT
Praneethdelroy Flanaganluis 
 
 
 [address] [facility] [address] [facility] 
[phone] Patient: Eileen Montes MRN: [id number] YKF:[birth year] A check karlene indicates which time of day the medication should be taken. My Medications START taking these medications Instructions Each Dose to Equal  
 Morning Noon Evening Bedtime  
 midodrine 5 mg tablet Commonly known as:  Heather Session Take 1 Tab by mouth three (3) times daily (with meals) for 30 days. 5 mg CHANGE how you take these medications Instructions Each Dose to Equal  
 Morning Noon Evening Bedtime  
 albuterol 90 mcg/actuation inhaler Commonly known as:  PROAIR HFA What changed:  additional instructions Take 1 Puff by inhalation every six (6) hours as needed for Wheezing or Shortness of Breath. 1 Puff CONTINUE taking these medications Instructions Each Dose to Equal  
 Morning Noon Evening Bedtime  
 aspirin delayed-release 81 mg tablet Take 81 mg by mouth daily. 81 mg  
    
  
   
   
   
  
 DIALYVITE 800 PLUS D PO Take  by mouth.  
     
  
   
   
   
  
 gabapentin 100 mg capsule Commonly known as:  NEURONTIN Take 100 mg by mouth two (2) times a day. Indications: \"take it when my nerves are bad\". 100 mg  
    
  
   
   
   
  
  
 omeprazole 20 mg capsule Commonly known as:  PRILOSEC Take 20 mg by mouth as needed. 20 mg  
    
   
   
   
  
 PLAVIX 75 mg Tab Generic drug:  clopidogrel Take 75 mg by mouth daily. 75 mg RENVELA 800 mg Tab tab Generic drug:  sevelamer carbonate Take 800 mg by mouth three (3) times daily (with meals). 800 mg SENSIPAR 30 mg tablet Generic drug:  cinacalcet Take 30 mg by mouth nightly. 30 mg  
    
   
   
   
  
  
 traMADol 50 mg tablet Commonly known as:  ULTRAM  
   
 Take 50 mg by mouth four (4) times daily as needed for Pain (for leg pain). 50 mg  
    
   
   
   
  
 XANAX 0.5 mg tablet Generic drug:  ALPRAZolam  
   
 Take 0.5 mg by mouth three (3) times daily as needed for Anxiety. 0.5 mg  
    
   
   
   
  
  
STOP taking these medications   
 cloNIDine HCl 0.2 mg tablet Commonly known as:  CATAPRES  
   
  
 losartan 50 mg tablet Commonly known as:  COZAAR Where to Get Your Medications Information on where to get these meds will be given to you by the nurse or doctor. ! Ask your nurse or doctor about these medications  
  midodrine 5 mg tablet

## 2018-06-01 NOTE — ED PROVIDER NOTES
HPI Comments: 44-year-old lady presents with concerns about being found on the floor by family today. Patient says she had up there earlier today because she felt weak. Patient just had a AV fistula placed in her right anterior thigh a few days ago for long-term dialysis access. She normally goes on Mondays, Wednesdays, and Fridays but she did not go on either Wednesday or Friday. Patient's current access is a catheter in her right chest.  Patient says in general she just feels fatigued but does not have any focal pain and has had no nausea or vomiting. Elements of this note were created using speech recognition software. As such, errors of speech recognition may be present. Patient is a 70 y.o. female presenting with hypotension. The history is provided by the patient. Hypotension           Past Medical History:   Diagnosis Date    Chronic kidney disease     ESRD    Dialysis patient ([facility] [address]     GERD (gastroesophageal reflux disease)     Hyperlipidemia     Hypertension 6/16/2010    Hypertension     Kidney failure     Liver disease     hepatitis C    Obesity     Other ill-defined conditions(799.89)     peripheral neuropathy, Pt states she doesn't have COPD or HTN    Peripheral neuropathy     Psychotic disorder        Past Surgical History:   Procedure Laterality Date    HX OTHER SURGICAL      access--left leg. axcess placed in both upper arms     HX UROLOGICAL      left nephrectomy         Family History:   Problem Relation Age of Onset    Heart Disease Mother     Hypertension Mother     Diabetes Mother     Hypertension Father        Social History     Social History    Marital status:      Spouse name: N/A    Number of children: N/A    Years of education: N/A     Occupational History    Not on file.      Social History Main Topics    Smoking status: Never Smoker    Smokeless tobacco: Never Used    Alcohol use No    Drug use: No      Comment: says even if she did she wouldn't tell me    Sexual activity: Not on file     Other Topics Concern    Not on file     Social History Narrative         ALLERGIES: Vancomycin    Review of Systems   Constitutional: Positive for appetite change and fatigue. HENT: Negative for congestion and rhinorrhea. Respiratory: Positive for shortness of breath. Negative for cough and chest tightness. Cardiovascular: Negative for chest pain and palpitations. Gastrointestinal: Negative for nausea and vomiting. Musculoskeletal: Negative for arthralgias and joint swelling. Skin: Positive for color change. Negative for wound. Neurological: Negative for dizziness and light-headedness. Vitals:    06/01/18 1618 06/01/18 1647 06/01/18 1651 06/01/18 1706   BP: 94/51 126/55 119/55    Pulse: 86 86 85    Resp: 18  26    Temp: 99.5 °F (37.5 °C)      SpO2: 94% 92% 98% 98%   Weight: 81.6 kg (180 lb)      Height: 4' 11\" (1.499 m)               Physical Exam   Constitutional: She is oriented to person, place, and time. She appears well-developed and well-nourished. HENT:   Head: Normocephalic and atraumatic. Eyes: Right eye exhibits no discharge. Left eye exhibits no discharge. Cardiovascular: Normal rate, regular rhythm and normal heart sounds. Pulmonary/Chest: Effort normal and breath sounds normal.   Abdominal: There is no tenderness. There is no rebound. Neurological: She is alert and oriented to person, place, and time. Skin:   Patient has some warmth over a area of a large hematoma on her right anterior thigh   Nursing note and vitals reviewed. MDM  Number of Diagnoses or Management Options  Acute hyperkalemia:   Diagnosis management comments: Patient has peaked T waves on her EKG and her potassium is 7.6. I a party given her an albuterol treatment and I will give her some insulin and glucose. I will discuss the case with nephrology for dialysis.     6:08 PM  I spoke with Dr. Alxeis Oconnor for from nephrology who kindly agreed to see the patient.         ED Course       Procedures

## 2018-06-01 NOTE — ED TRIAGE NOTES
Pt arrives per EMS from home for complaints of missing two times of dialysis and now feeling weak. Pt found laying on floor when EMS came to her house. States she got a new dialysis access in right leg on Tuesday. Pt found hypotensive by EMS 82/38. BGL 90. EMS unable to get IV access so nothing given.

## 2018-06-01 NOTE — Clinical Note
Status[de-identified] Inpatient [101] Type of Bed: Medical [8] Inpatient Hospitalization Certified Necessary for the Following Reasons: 3. Patient receiving treatment that can only be provided in an inpatient setting (further clarification in H&P documentation) Admitting Diagnosis: ESRD on dialysis [facility]) [[id number]] Admitting Physician: Lisa Valdez [[id number]] Attending Physician: Lisa Valdez [[id number]] Estimated Length of Stay: 2 Midnights Discharge Plan[de-identified] Home with Office Follow-up

## 2018-06-01 NOTE — IP AVS SNAPSHOT
[address] 
[phone] Patient: Bubba Taylor MRN: [id number] About your hospitalization You were admitted on:  June 1, 2018 You last received care in the:  [facility] You were discharged on:  June 5, 2018 Why you were hospitalized Your primary diagnosis was:  Esrd On Dialysis (Formerly [facility]) Your diagnoses also included:  Hyperkalemia, Hypertension, Copd (Chronic Obstructive Pulmonary Disease) (Formerly [facility]), Class 2 Severe Obesity Due To Excess Calories With Serious Comorbidity In Adult (Formerly [facility]) Follow-up Information Follow up With Details Comments Contact Info Matthieu Gonzalez MD Go on 6/26/2018 10:15am for recheck right thigh  [address] [address] 
[phone] Kiko Salazar MD On 6/12/2018 1:15pm [address] [address] 
[phone] Your Scheduled Appointments Tuesday June 12, 2018  1:15 PM EDT Office Visit with Kiko Salazar MD  
[facility] ([facility]) [address] [place] [address]  
[phone] Tuesday June 26, 2018 10:15 AM EDT Global Post Op with Matthieu Gonzalez MD  
Critical access hospital VASCULAR SURGERY ([facility]) [address] [address]  
[phone] Discharge Orders None A check karlene indicates which time of day the medication should be taken. My Medications START taking these medications Instructions Each Dose to Equal  
 Morning Noon Evening Bedtime  
 midodrine 5 mg tablet Commonly known as:  Rand  Take 1 Tab by mouth three (3) times daily (with meals) for 30 days. 5 mg CHANGE how you take these medications Instructions Each Dose to Equal  
 Morning Noon Evening Bedtime albuterol 90 mcg/actuation inhaler Commonly known as:  PROAIR HFA What changed:  additional instructions Take 1 Puff by inhalation every six (6) hours as needed for Wheezing or Shortness of Breath. 1 Puff CONTINUE taking these medications Instructions Each Dose to Equal  
 Morning Noon Evening Bedtime  
 aspirin delayed-release 81 mg tablet Take 81 mg by mouth daily. 81 mg  
    
  
   
   
   
  
 DIALYVITE 800 PLUS D PO Take  by mouth.  
     
  
   
   
   
  
 gabapentin 100 mg capsule Commonly known as:  NEURONTIN Take 100 mg by mouth two (2) times a day. Indications: \"take it when my nerves are bad\". 100 mg  
    
  
   
   
   
  
  
 omeprazole 20 mg capsule Commonly known as:  PRILOSEC Take 20 mg by mouth as needed. 20 mg  
    
   
   
   
  
 PLAVIX 75 mg Tab Generic drug:  clopidogrel Take 75 mg by mouth daily. 75 mg RENVELA 800 mg Tab tab Generic drug:  sevelamer carbonate Take 800 mg by mouth three (3) times daily (with meals). 800 mg SENSIPAR 30 mg tablet Generic drug:  cinacalcet Take 30 mg by mouth nightly. 30 mg  
    
   
   
   
  
  
 traMADol 50 mg tablet Commonly known as:  ULTRAM  
   
 Take 50 mg by mouth four (4) times daily as needed for Pain (for leg pain). 50 mg  
    
   
   
   
  
 XANAX 0.5 mg tablet Generic drug:  ALPRAZolam  
   
 Take 0.5 mg by mouth three (3) times daily as needed for Anxiety. 0.5 mg  
    
   
   
   
  
  
STOP taking these medications   
 cloNIDine HCl 0.2 mg tablet Commonly known as:  CATAPRES  
   
  
 losartan 50 mg tablet Commonly known as:  COZAAR Where to Get Your Medications Information on where to get these meds will be given to you by the nurse or doctor. ! Ask your nurse or doctor about these medications  
  midodrine 5 mg tablet Opioid Education Prescription Opioids: What You Need to Know: 
 
 
After general anesthesia or intravenous sedation, for 24 hours or while taking prescription Narcotics: · Limit your activities · Do not drive and operate hazardous machinery · Do not make important personal or business decisions · Do  not drink alcoholic beverages · If you have not urinated within 8 hours after discharge, please contact your surgeon on call. Report the following to your surgeon: 
· Excessive pain, swelling, redness or odor of or around the surgical area · Temperature over 100.5 · Nausea and vomiting lasting longer than 4 hours or if unable to take medications · Any signs of decreased circulation or nerve impairment to extremity: change in color, persistent  numbness, tingling, coldness or increase pain · Any questions What to do at Home: *  Please give a list of your current medications to your Primary Care Provider. *  Please update this list whenever your medications are discontinued, doses are 
    changed, or new medications (including over-the-counter products) are added. *  Please carry medication information at all times in case of emergency situations. These are general instructions for a healthy lifestyle: No smoking/ No tobacco products/ Avoid exposure to second hand smoke Surgeon General's Warning:  Quitting smoking now greatly reduces serious risk to your health. Obesity, smoking, and sedentary lifestyle greatly increases your risk for illness A healthy diet, regular physical exercise & weight monitoring are important for maintaining a healthy lifestyle You may be retaining fluid if you have a history of heart failure or if you experience any of the following symptoms:  Weight gain of 3 pounds or more overnight or 5 pounds in a week, increased swelling in our hands or feet or shortness of breath while lying flat in bed. Please call your doctor as soon as you notice any of these symptoms; do not wait until your next office visit. Recognize signs and symptoms of STROKE: 
 
F-face looks uneven A-arms unable to move or move unevenly S-speech slurred or non-existent T-time-call 911 as soon as signs and symptoms begin-DO NOT go Back to bed or wait to see if you get better-TIME IS BRAIN. Warning Signs of HEART ATTACK Call 911 if you have these symptoms: 
? Chest discomfort. Most heart attacks involve discomfort in the center of the chest that lasts more than a few minutes, or that goes away and comes back. It can feel like uncomfortable pressure, squeezing, fullness, or pain. ? Discomfort in other areas of the upper body. Symptoms can include pain or discomfort in one or both arms, the back, neck, jaw, or stomach. ? Shortness of breath with or without chest discomfort. ? Other signs may include breaking out in a cold sweat, nausea, or lightheadedness. Don't wait more than five minutes to call [address]! Fast action can save your life. Calling 911 is almost always the fastest way to get lifesaving treatment. Emergency Medical Services staff can begin treatment when they arrive  up to an hour sooner than if someone gets to the hospital by car. The discharge information has been reviewed with the patient. The patient verbalized understanding.  
Discharge medications reviewed with the patient and appropriate educational materials and side effects teaching were provided. Hyperkalemia: Care Instructions Your Care Instructions Hyperkalemia is too much potassium in the blood. Potassium helps keep the right mix of fluids in your body. It also helps your nerves and muscles work as they should. And it keeps your heartbeat in a normal rhythm. Some things can raise potassium levels. These include some health problems, medicines, and kidney problems. (Normally, your kidneys remove extra potassium.) Too much potassium can cause nausea. It also can cause a heartbeat that isn't normal. But you may not have any symptoms. Too much potassium can be dangerous. That's why it's important to treat it. If you are taking any of the medicines that can raise your levels, your doctor will ask you to stop. You may get medicines to lower your levels. And you may have to limit or not eat foods that have a lot of potassium. Follow-up care is a key part of your treatment and safety. Be sure to make and go to all appointments, and call your doctor if you are having problems. It's also a good idea to know your test results and keep a list of the medicines you take. How can you care for yourself at home? · Take your medicines exactly as prescribed. Call your doctor if you think you are having a problem with your medicine. · Stop taking certain medicines if your doctor asks you to. They may be causing your high potassium levels. If you have concerns about stopping medicine, talk with your doctor. · If you have kidney, heart, or liver disease and have to limit fluids, talk with your doctor before you increase the amount of fluids you drink. If the doctor says it's okay, drink plenty of fluids. This means drinking enough so that your urine is light yellow or clear like water. · Avoid strenuous exercise until your doctor tells you it is okay.  
· Potassium is in many foods, including vegetables, fruits, and milk products. Foods high in potassium include bananas, cantaloupe, broccoli, milk, potatoes, and tomatoes. · Low potassium foods include blueberries, raspberries, cucumber, white or brown rice, spaghetti, and macaroni. · Do not use a salt substitute without talking to your doctor first. Most of these are very high in potassium. · Be sure to tell your doctor about any prescription, over-the-counter, or herbal medicines you take. Some of these can raise potassium. When should you call for help? Call 911 anytime you think you may need emergency care. For example, call if: 
? · You passed out (lost consciousness). ? · You have an unusual heartbeat. Your heart may beat fast or skip beats. ?Call your doctor now or seek immediate medical care if: 
? · You have muscle aches. ? · You feel very weak. ? Watch closely for changes in your health, and be sure to contact your doctor if: 
? · You do not get better as expected. Where can you learn more? Go to http://madeline-flash.info/. Enter Y187 in the search box to learn more about \"Hyperkalemia: Care Instructions. \" Current as of: May 12, 2017 Content Version: 11.4 © 9712-0877 [employer]. Care instructions adapted under license by [employer] (which disclaims liability or warranty for this information). If you have questions about a medical condition or this instruction, always ask your healthcare professional. [place] [address] any warranty or liability for your use of this information. End-Stage Renal Disease: Care Instructions Your Care Instructions End-stage renal (or kidney) disease happens when your kidneys can no longer do their jobs. They can't remove waste from your blood. And they aren't able to balance your body's fluids and chemicals. This stage of the disease usually occurs after you have chronic kidney disease for years.  Now the kidneys work so poorly that you need dialysis or a kidney transplant. Dialysis uses a machine to filter your waste. A transplant is surgery to give you a healthy kidney from another person. Follow-up care is a key part of your treatment and safety. Be sure to make and go to all appointments, and call your doctor if you are having problems. It's also a good idea to know your test results and keep a list of the medicines you take. How can you care for yourself at home? ? · Be safe with medicines. Take your medicines exactly as prescribed. Call your doctor if you have any problems with your medicine. You also may take medicine to control your blood pressure or to treat diabetes. Many people who have diabetes take blood pressure medicine. ? · If you have diabetes, do your best to keep your blood sugar in your target range. You may do this by taking medicine, eating healthy food, and exercising. ? · Follow your dialysis schedule. ? · Do not take ibuprofen (Advil, Motrin), naproxen (Aleve), or similar medicines, unless your doctor tells you to. These may make chronic kidney disease worse. ? · Make sure your doctor knows all of the medicines, vitamins, supplements, and herbal remedies you take. ? · Do not smoke or use other tobacco products. Smoking can reduce blood flow to the kidneys. If you need help quitting, talk to your doctor about stop-smoking programs and medicines. These can increase your chances of quitting for good. ? · Do not drink alcohol or use illegal drugs. ? · If your doctor recommends it, get more exercise. Walking is a good choice. ? · If you have an advance directive, let your doctor know. It may include a living will and a durable power of  for health care. If you don't have one, you may want to prepare one. It lets your doctor and loved ones know your health care wishes if you become unable to speak for yourself. Diet ? · Talk to a registered dietitian.  He or she can help you make a meal plan that is right for you. Most people with kidney disease need to limit salt (sodium), fluids, and protein. Some also have to limit potassium and phosphorus. When should you call for help? Call 911 anytime you think you may need emergency care. For example, call if: 
? · You passed out (lost consciousness). ?Call your doctor now or seek immediate medical care if: 
? · You have new or worse nausea and vomiting. ? · You have much less urine than normal, or you have no urine. ? · You are feeling confused or cannot think clearly. ? · You have new or more blood in your urine. ? · You have new swelling. ? · You are dizzy or lightheaded, or feel like you may faint. ? Watch closely for changes in your health, and be sure to contact your doctor if you have any problems. Where can you learn more? Go to http://madeline-flash.info/. Enter E638 in the search box to learn more about \"End-Stage Renal Disease: Care Instructions. \" Current as of: May 12, 2017 Content Version: 11.4 © 9733-0798 [employer]. Care instructions adapted under license by [employer] (which disclaims liability or warranty for this information). If you have questions about a medical condition or this instruction, always ask your healthcare professional. [address] any warranty or liability for your use of this information. ___________________________________________________________________________________________________________________________________ ACO Transitions of Care [facility] offers a voluntary care coordination program to provide high quality service and care to Garima Manning fee-for-service beneficiaries. Swetha Ram was designed to help you enhance your health and well-being through the following services: ? Transitions of Care  support for individuals who are transitioning from one care setting to another (example: Hospital to home). ? Chronic and Complex Care Coordination  support for individuals and caregivers of those with serious or chronic illnesses or with more than one chronic (ongoing) condition and those who take a number of different medications. If you meet specific medical criteria, a [facility] may call you directly to coordinate your care with your primary care physician and your other care providers. For questions about the [facility] programs, please, contact your physicians office. For general questions or additional information about Accountable Care Organizations: 
Please visit www.medicare.gov/acos. html or call 1-800-MEDICARE (1-886.952.5886) TTY users should call 6-268.734.6681. Introducing [facility] & HEALTH SERVICES! [employer] introduces ihiji patient portal. Now you can access parts of your medical record, email your doctor's office, and request medication refills online. 1. In your internet browser, go to [email or web address] 2. Click on the First Time User? Click Here link in the Sign In box. You will see the New Member Sign Up page. 3. Enter your ihiji Access Code exactly as it appears below. You will not need to use this code after youve completed the sign-up process. If you do not sign up before the expiration date, you must request a new code. · ihiji Access Code: [email or web address] Expires: 8/30/2018  4:20 PM 
 
4. Enter the last four digits of your Social Security Number (xxxx) and Date of Birth (mm/dd/yyyy) as indicated and click Submit. You will be taken to the next sign-up page. 5. Create a Ubequityt ID. This will be your ihiji login ID and cannot be changed, so think of one that is secure and easy to remember. 6. Create a Ubequityt password. You can change your password at any time. 7. Enter your Password Reset Question and Answer. This can be used at a later time if you forget your password. 8. Enter your e-mail address. You will receive e-mail notification when new information is available in [address] 9. Click Sign Up. You can now view and download portions of your medical record. 10. Click the Download Summary menu link to download a portable copy of your medical information. If you have questions, please visit the Frequently Asked Questions section of the [employer] website. Remember, [employer] is NOT to be used for urgent needs. For medical emergencies, dial 911. Now available from your iPhone and Android! Introducing Gilberto Tao As a [employer] patient, I wanted to make you aware of our electronic visit tool called Gilberto Tao. [employer] 24/7 allows you to connect within minutes with a medical provider 24 hours a day, seven days a week via a mobile device or tablet or logging into a secure website from your computer. You can access Gilberto Tao from anywhere in the [place]. A virtual visit might be right for you when you have a simple condition and feel like you just dont want to get out of bed, or cant get away from work for an appointment, when your regular [employer] provider is not available (evenings, weekends or holidays), or when youre out of town and need minor care. Electronic visits cost only $49 and if the [employer] 24/7 provider determines a prescription is needed to treat your condition, one can be electronically transmitted to a nearby pharmacy*. Please take a moment to enroll today if you have not already done so. The enrollment process is free and takes just a few minutes. To enroll, please download the [employer] 24/7 ollie to your tablet or phone, or visit [email or web address] to enroll on your computer.    
And, as an [address] [place] [address] patient with a [employer] account, the results of your visits will be scanned into your electronic medical record and your primary care provider will be able to view the scanned results. We urge you to continue to see your regular [facility] provider for your ongoing medical care. And while your primary care provider may not be the one available when you seek a Gilberto Tao virtual visit, the peace of mind you get from getting a real diagnosis real time can be priceless. For more information on Gilberto Estevezfin, view our Frequently Asked Questions (FAQs) at [email or web address]. Sincerely, 
 
Ankit Felton MD 
Chief Medical Officer [employer] *:  certain medications cannot be prescribed via Gilberto Herbfin Providers Seen During Your Hospitalization Provider Specialty Primary office phone Gisel Maki MD Emergency Medicine [phone] Odilia Jackman MD Family Practice [phone] Your Primary Care Physician (PCP) Primary Care Physician Office Phone Office Fax Angel Medinazack [phone] You are allergic to the following Allergen Reactions Vancomycin Anaphylaxis Recent Documentation Height Weight Breastfeeding? BMI OB Status Smoking Status 1.499 m 85.5 kg No 38.05 kg/m2 Postmenopausal Never Smoker Emergency Contacts Name Discharge Info Relation Home Work Mobile [facility]   [24] [phone] Kush Gill [5] Patient Belongings The following personal items are in your possession at time of discharge: 
  Dental Appliances: None  Visual Aid: Glasses, At home      Home Medications: None   Jewelry: None  Clothing: Other (comment) (pink pajama top no pants)    Other Valuables: Cell Phone, Purse, At bedside, With patient (pt.refuse purse to be lock up)  Personal Items Sent to Safe: none Please provide this summary of care documentation to your next provider. Signatures-by signing, you are acknowledging that this After Visit Summary has been reviewed with you and you have received a copy. Patient Signature:  ____________________________________________________________ Date:  ____________________________________________________________  
  
Mickeal Old Provider Signature:  ____________________________________________________________ Date:  ____________________________________________________________

## 2018-06-02 LAB
ANION GAP SERPL CALC-SCNC: 11 MMOL/L (ref 7–16)
BASOPHILS # BLD: 0 K/UL (ref 0–0.2)
BASOPHILS NFR BLD: 0 % (ref 0–2)
BUN SERPL-MCNC: 26 MG/DL (ref 8–23)
CALCIUM SERPL-MCNC: 8.7 MG/DL (ref 8.3–10.4)
CHLORIDE SERPL-SCNC: 100 MMOL/L (ref 98–107)
CO2 SERPL-SCNC: 28 MMOL/L (ref 21–32)
CREAT SERPL-MCNC: 7.99 MG/DL (ref 0.6–1)
DIFFERENTIAL METHOD BLD: ABNORMAL
EOSINOPHIL # BLD: 0.2 K/UL (ref 0–0.8)
EOSINOPHIL NFR BLD: 4 % (ref 0.5–7.8)
ERYTHROCYTE [DISTWIDTH] IN BLOOD BY AUTOMATED COUNT: 14.1 % (ref 11.9–14.6)
GLUCOSE SERPL-MCNC: 67 MG/DL (ref 65–100)
HCT VFR BLD AUTO: 29.4 % (ref 35.8–46.3)
HGB BLD-MCNC: 9.6 G/DL (ref 11.7–15.4)
IMM GRANULOCYTES # BLD: 0 K/UL (ref 0–0.5)
IMM GRANULOCYTES NFR BLD AUTO: 0 % (ref 0–5)
LYMPHOCYTES # BLD: 1.2 K/UL (ref 0.5–4.6)
LYMPHOCYTES NFR BLD: 23 % (ref 13–44)
MCH RBC QN AUTO: 28 PG (ref 26.1–32.9)
MCHC RBC AUTO-ENTMCNC: 32.7 G/DL (ref 31.4–35)
MCV RBC AUTO: 85.7 FL (ref 79.6–97.8)
MONOCYTES # BLD: 0.6 K/UL (ref 0.1–1.3)
MONOCYTES NFR BLD: 11 % (ref 4–12)
NEUTS SEG # BLD: 3.2 K/UL (ref 1.7–8.2)
NEUTS SEG NFR BLD: 62 % (ref 43–78)
PLATELET # BLD AUTO: 133 K/UL (ref 150–450)
PMV BLD AUTO: 10.4 FL (ref 10.8–14.1)
POTASSIUM SERPL-SCNC: 5.9 MMOL/L (ref 3.5–5.1)
RBC # BLD AUTO: 3.43 M/UL (ref 4.05–5.25)
SODIUM SERPL-SCNC: 139 MMOL/L (ref 136–145)
URATE SERPL-MCNC: 3.4 MG/DL (ref 2.6–6)
WBC # BLD AUTO: 5.1 K/UL (ref 4.3–11.1)

## 2018-06-02 PROCEDURE — 5A1D70Z PERFORMANCE OF URINARY FILTRATION, INTERMITTENT, LESS THAN 6 HOURS PER DAY: ICD-10-PCS | Performed by: INTERNAL MEDICINE

## 2018-06-02 PROCEDURE — 65610000001 HC ROOM ICU GENERAL

## 2018-06-02 PROCEDURE — 90935 HEMODIALYSIS ONE EVALUATION: CPT

## 2018-06-02 PROCEDURE — 77030019605

## 2018-06-02 PROCEDURE — 85025 COMPLETE CBC W/AUTO DIFF WBC: CPT | Performed by: FAMILY MEDICINE

## 2018-06-02 PROCEDURE — 36415 COLL VENOUS BLD VENIPUNCTURE: CPT | Performed by: FAMILY MEDICINE

## 2018-06-02 PROCEDURE — 84550 ASSAY OF BLOOD/URIC ACID: CPT | Performed by: INTERNAL MEDICINE

## 2018-06-02 PROCEDURE — 74011250637 HC RX REV CODE- 250/637: Performed by: INTERNAL MEDICINE

## 2018-06-02 PROCEDURE — 74011250636 HC RX REV CODE- 250/636: Performed by: INTERNAL MEDICINE

## 2018-06-02 PROCEDURE — 74011250637 HC RX REV CODE- 250/637: Performed by: FAMILY MEDICINE

## 2018-06-02 PROCEDURE — 80048 BASIC METABOLIC PNL TOTAL CA: CPT | Performed by: FAMILY MEDICINE

## 2018-06-02 PROCEDURE — 74011250636 HC RX REV CODE- 250/636: Performed by: FAMILY MEDICINE

## 2018-06-02 RX ORDER — MIDODRINE HYDROCHLORIDE 5 MG/1
10 TABLET ORAL
Status: DISCONTINUED | OUTPATIENT
Start: 2018-06-02 | End: 2018-06-05 | Stop reason: HOSPADM

## 2018-06-02 RX ORDER — HYDRALAZINE HYDROCHLORIDE 20 MG/ML
10 INJECTION INTRAMUSCULAR; INTRAVENOUS
Status: DISCONTINUED | OUTPATIENT
Start: 2018-06-02 | End: 2018-06-05 | Stop reason: HOSPADM

## 2018-06-02 RX ADMIN — TRAMADOL HYDROCHLORIDE 50 MG: 50 TABLET, FILM COATED ORAL at 04:52

## 2018-06-02 RX ADMIN — RENAGEL 800 MG: 400 TABLET ORAL at 08:12

## 2018-06-02 RX ADMIN — Medication 10 ML: at 04:56

## 2018-06-02 RX ADMIN — Medication 10 ML: at 14:10

## 2018-06-02 RX ADMIN — Medication 10 ML: at 21:16

## 2018-06-02 RX ADMIN — HEPARIN SODIUM 5000 UNITS: 5000 INJECTION, SOLUTION INTRAVENOUS; SUBCUTANEOUS at 21:14

## 2018-06-02 RX ADMIN — HYDROCODONE BITARTRATE AND ACETAMINOPHEN 1 TABLET: 10; 325 TABLET ORAL at 21:14

## 2018-06-02 RX ADMIN — HEPARIN SODIUM 5000 UNITS: 5000 INJECTION, SOLUTION INTRAVENOUS; SUBCUTANEOUS at 14:02

## 2018-06-02 RX ADMIN — ACETAMINOPHEN 650 MG: 325 TABLET ORAL at 14:01

## 2018-06-02 RX ADMIN — TRAMADOL HYDROCHLORIDE 50 MG: 50 TABLET, FILM COATED ORAL at 16:46

## 2018-06-02 RX ADMIN — HEPARIN SODIUM 5000 UNITS: 5000 INJECTION, SOLUTION INTRAVENOUS; SUBCUTANEOUS at 05:09

## 2018-06-02 RX ADMIN — MENTHOL, METHYL SALICYLATE: 10; 15 CREAM TOPICAL at 21:15

## 2018-06-02 RX ADMIN — CINACALCET HYDROCHLORIDE 30 MG: 30 TABLET, COATED ORAL at 21:15

## 2018-06-02 RX ADMIN — MIDODRINE HYDROCHLORIDE 10 MG: 5 TABLET ORAL at 12:00

## 2018-06-02 RX ADMIN — RENAGEL 800 MG: 400 TABLET ORAL at 21:15

## 2018-06-02 RX ADMIN — ASPIRIN 81 MG: 81 TABLET, COATED ORAL at 08:12

## 2018-06-02 RX ADMIN — MENTHOL, METHYL SALICYLATE: 10; 15 CREAM TOPICAL at 14:38

## 2018-06-02 RX ADMIN — CLONIDINE HYDROCHLORIDE 0.2 MG: 0.2 TABLET ORAL at 08:12

## 2018-06-02 RX ADMIN — RENAGEL 800 MG: 400 TABLET ORAL at 16:26

## 2018-06-02 RX ADMIN — SODIUM CHLORIDE 250 ML: 900 INJECTION, SOLUTION INTRAVENOUS at 10:21

## 2018-06-02 RX ADMIN — CLOPIDOGREL BISULFATE 75 MG: 75 TABLET ORAL at 08:12

## 2018-06-02 RX ADMIN — MIDODRINE HYDROCHLORIDE 10 MG: 5 TABLET ORAL at 16:25

## 2018-06-02 NOTE — PROGRESS NOTES
LEAPFROG EVALUATION -- [facility]    The Patient is current in the ICU for: ESRD , HD, Hyperkalemia   He is being managed by: The Hospitalist Team   No need for any additional acute ICU interventions. Patient is awake and alert. Currently hemodynamically stable. Please Call if Needed. No Charge. Tony Rosales MD    Electronically signed.

## 2018-06-02 NOTE — PROGRESS NOTES
1000:BP noted to be low(SBP 69/) Dr Britt Olp on unit and informed, order received, pt awake alert and oriented, no symptoms noted. Noted to have dark area on 3rd digit of right hand, stated \"i'm having this amputated, i'm serious, I have that gangrene in it\"  1012: Dr Kike Sheikh arrived at bedside. 1815: asleep at the time, no distress noted, has complained about pain in hands \"I have really bad arthritis\", received PO Ultram for pain.

## 2018-06-02 NOTE — H&P
HOSPITALIST H&P/CONSULT  NAME:  Hortencia Gonzáles   Age:  70 y.o.  :   1950   MRN:   [id number]  PCP: Rowdy Courtney MD  Treatment Team: Attending Provider: Edel Rogers MD; Primary Nurse: Charanjit Schaefer    Prior     CC: Reason for admission is: esrd on hd; hyperK+    HPI:   Patient history was obtained from the ER provider prior to seeing the patient. Patient is a 70 y.o. female who presents to the ER due to weakness. Her family found her on the floor earlier today. She denies a fall, just slumped to floor; but was too weak to get back up. She reports missing last 2 dialysis sessions due to transportation issues. Denies fever/chills, n/v/d, chest or abdominal pain. ER workup does show peaked T waves and hyperkalemia. They have called nephrology, and they plan to dialyze tonight. ROS:  All systems have been reviewed and are negative except as stated in HPI or elsewhere. Past Medical History:   Diagnosis Date    Chronic kidney disease     ESRD    Dialysis patient ([facility] [address]     GERD (gastroesophageal reflux disease)     Hyperlipidemia     Hypertension 2010    Hypertension     Kidney failure     Liver disease     hepatitis C    Obesity     Other ill-defined conditions(799.89)     peripheral neuropathy, Pt states she doesn't have COPD or HTN    Peripheral neuropathy     Psychotic disorder       Past Surgical History:   Procedure Laterality Date    HX OTHER SURGICAL      access--left leg.   axcess placed in both upper arms     HX UROLOGICAL      left nephrectomy      Social History   Substance Use Topics    Smoking status: Never Smoker    Smokeless tobacco: Never Used    Alcohol use No      Family History   Problem Relation Age of Onset    Heart Disease Mother     Hypertension Mother     Diabetes Mother     Hypertension Father        FH Reviewed and non-contributory to admitting diagnosis    Allergies   Allergen Reactions    Vancomycin Anaphylaxis      Prior to Admission Medications   Prescriptions Last Dose Informant Patient Reported? Taking? ALPRAZolam (XANAX) 0.5 mg tablet   Yes No   Sig: Take 0.5 mg by mouth three (3) times daily as needed for Anxiety. RENVELA 800 mg tab tab   Yes No   Sig: Take 800 mg by mouth three (3) times daily (with meals). VIT B COMP C/FOLIC ACID/VIT D3 (DIALYVITE 800 PLUS D PO)   Yes No   Sig: Take  by mouth. albuterol (PROAIR HFA) 90 mcg/actuation inhaler   No No   Sig: Take 1 Puff by inhalation every six (6) hours as needed for Wheezing or Shortness of Breath. Patient taking differently: Take 1 Puff by inhalation every six (6) hours as needed for Wheezing or Shortness of Breath. Take day of surgery per anesthesia protocol. Bring DOS   aspirin delayed-release 81 mg tablet   Yes No   Sig: Take 81 mg by mouth daily. cinacalcet (SENSIPAR) 30 mg tablet   Yes No   Sig: Take 30 mg by mouth nightly. cloNIDine HCl (CATAPRES) 0.2 mg tablet   Yes No   Si.2 mg two (2) times a day. clopidogrel (PLAVIX) 75 mg tab   Yes No   Sig: Take 75 mg by mouth daily. gabapentin (NEURONTIN) 100 mg capsule   Yes No   Sig: Take 100 mg by mouth two (2) times a day. Indications: \"take it when my nerves are bad\". losartan (COZAAR) 50 mg tablet   Yes No   Sig: Take 50 mg by mouth daily. Indications: hypertension   omeprazole (PRILOSEC) 20 mg capsule   Yes No   Sig: Take 20 mg by mouth as needed. traMADol (ULTRAM) 50 mg tablet   Yes No   Sig: Take 50 mg by mouth four (4) times daily as needed for Pain (for leg pain).       Facility-Administered Medications: None         Objective:   Patient Vitals for the past 24 hrs:   Temp Pulse Resp BP SpO2   18 1951 - 91 - 118/53 96 %   18 1851 - 87 (!) 32 95/44 96 %   18 1751 - 85 27 99/52 92 %   18 1706 - - - - 98 %   18 1651 - 85 26 119/55 98 %   18 1647 - 86 - 126/55 92 %   18 1618 99.5 °F (37.5 °C) 86 18 94/51 94 %     No intake or output data in the 24 hours ending 18   Temp (24hrs), Av.5 °F (37.5 °C), Min:99.5 °F (37.5 °C), Max:99.5 °F (37.5 °C)    Oxygen Therapy  O2 Sat (%): 96 % (18)  Pulse via Oximetry: 92 beats per minute (18)  O2 Device: Room air (18 1706)   Body mass index is 36.36 kg/(m^2). Physical Exam:    General:    WD and WN, No apparent distress. Head:   Normocephalic, without obvious abnormality, atraumatic. Eyes:  PERRL; EOMI; sclera normal/non-icteric  ENT:  Hearing is normal.  Oropharynx is clear with tacky mucous membranes   Resp:    Clear to auscultation bilaterally. No Wheezing or Rhonchi. Resp are even and unlabored  Heart[de-identified]  Regular rate and rhythm,  no murmur,   No LE edema  Abdomen:   Soft, non-tender. Not distended. Bowel sounds normal.  hepato-splenomegaly cannot be assess due to obesity   Musc/SK: Muscle strength is good and tone normal; No cyanosis. No clubbing  Skin:     Texture, turgor normal. No significant rashes or lesions. Neurologic: CN II - XII are grossly intact - other than Eye exam as noted above  Psych: drowsy and oriented x 4;  Judgement and insight are normal     Data Review:   Recent Results (from the past 24 hour(s))   EKG, 12 LEAD, INITIAL    Collection Time: 18  4:52 PM   Result Value Ref Range    Ventricular Rate 83 BPM    Atrial Rate 83 BPM    P-R Interval 200 ms    QRS Duration 98 ms    Q-T Interval 356 ms    QTC Calculation (Bezet) 418 ms    Calculated P Axis 62 degrees    Calculated R Axis -19 degrees    Calculated T Axis 69 degrees    Diagnosis       !! AGE AND GENDER SPECIFIC ECG ANALYSIS !!   Normal sinus rhythm  Nonspecific ST abnormality  Abnormal ECG  When compared with ECG of 2014 22:11,  T wave amplitude has increased in Anterior leads     CBC WITH AUTOMATED DIFF    Collection Time: 18  4:59 PM   Result Value Ref Range    WBC 7.4 4.3 - 11.1 K/uL    RBC 3.96 (L) 4.05 - 5.25 M/uL    HGB 11.1 (L) 11.7 - 15.4 g/dL    HCT 34.3 (L) 35.8 - 46.3 %    MCV 86.6 79.6 - 97.8 FL    MCH 28.0 26.1 - 32.9 PG    MCHC 32.4 31.4 - 35.0 g/dL    RDW 14.5 11.9 - 14.6 %    PLATELET 485 (L) 478 - 450 K/uL    MPV 10.4 (L) 10.8 - 14.1 FL    DF AUTOMATED      NEUTROPHILS 64 43 - 78 %    LYMPHOCYTES 23 13 - 44 %    MONOCYTES 10 4.0 - 12.0 %    EOSINOPHILS 3 0.5 - 7.8 %    BASOPHILS 0 0.0 - 2.0 %    IMMATURE GRANULOCYTES 0 0.0 - 5.0 %    ABS. NEUTROPHILS 4.7 1.7 - 8.2 K/UL    ABS. LYMPHOCYTES 1.7 0.5 - 4.6 K/UL    ABS. MONOCYTES 0.7 0.1 - 1.3 K/UL    ABS. EOSINOPHILS 0.2 0.0 - 0.8 K/UL    ABS. BASOPHILS 0.0 0.0 - 0.2 K/UL    ABS. IMM. GRANS. 0.0 0.0 - 0.5 K/UL   METABOLIC PANEL, BASIC    Collection Time: 06/01/18  4:59 PM   Result Value Ref Range    Sodium 137 136 - 145 mmol/L    Potassium 7.6 (HH) 3.5 - 5.1 mmol/L    Chloride 99 98 - 107 mmol/L    CO2 23 21 - 32 mmol/L    Anion gap 15 7 - 16 mmol/L    Glucose 78 65 - 100 mg/dL    BUN 67 (H) 8 - 23 MG/DL    Creatinine 15.10 (H) 0.6 - 1.0 MG/DL    GFR est AA 3 (L) >60 ml/min/1.73m2    GFR est non-AA 3 (L) >60 ml/min/1.73m2    Calcium 8.8 8.3 - 10.4 MG/DL   MAGNESIUM    Collection Time: 06/01/18  4:59 PM   Result Value Ref Range    Magnesium 3.1 (H) 1.8 - 2.4 mg/dL   CK    Collection Time: 06/01/18  4:59 PM   Result Value Ref Range     21 - 215 U/L   POC TROPONIN-I    Collection Time: 06/01/18  5:09 PM   Result Value Ref Range    Troponin-I (POC) 0.02 0.02 - 0.05 ng/ml   POC LACTIC ACID    Collection Time: 06/01/18  5:11 PM   Result Value Ref Range    Lactic Acid (POC) 2.0 (H) 0.5 - 1.9 mmol/L     CXR Results  (Last 48 hours)               06/01/18 1804  XR CHEST PORT Final result    Impression:  IMPRESSION:    1. No acute findings evident by plain film imaging. Narrative:  CHEST RADIOGRAPH, 1 views, 6/1/2018       History: Shortness of breath.        Technique: Portable frontal view of the chest.        Comparison: Chest radiograph 5/23/2017       Findings:    A right internal jugular dialysis catheter is seen. The heart is not   significantly enlarged given AP technique. Lungs are expanded without   pneumothorax. No consolidation, or pleural effusion is seen. CT Results  (Last 48 hours)    None              Assessment and Plan: Active Hospital Problems    Diagnosis Date Noted    Hyperkalemia 06/01/2018    Obesity 05/26/2017    [facility] ([facility] 06/16/2010    Hypertension 06/16/2010    COPD (chronic obstructive pulmonary disease) ([facility] 06/16/2010     Principal Problem:    ESRD on dialysis [facility]) (6/16/2010)    Nephrology to manage    Active Problems:    COPD (chronic obstructive pulmonary disease) ([facility] (6/16/2010)    Home meds      Hypertension (6/16/2010)    Home meds      Obesity (5/26/2017)    advise      Hyperkalemia (6/1/2018)    nephrol to manage      · PLAN   · Cont appropriate home meds (see MAR)  · Control symptoms (pain, n/v, fever, etc)  · Monitor appropriate labs   · DVT prophylaxis: heparin  · Code status: Full  · Risk: high  · Anticipated DC needs:  · Estimated LOS:  less than 2 midnights  · Plans discussed with patient and/or caregiver; questions answered.       Med records reviewed if applicable; findings:     Critical care time if applicable:      Signed By: Edel Rogers MD     June 1, 2018

## 2018-06-02 NOTE — PROGRESS NOTES
BP has been low all day, difficulty to get adequate readings due to prior numerous fistula placements and 20 year HD treatments, added 250 cc bolus earlier today and midodrine, currently /47 left lower forearm, patient without complaints and oriented and talkative, antihypertensives held/ stopped, discussed via phone with dr Andrzej Amaya and I feel that she is not symptomatic and that her calcified blood vessels are making  It difficult to obtain adequate true BP readings, will monitor  Edmundo Wilson MD

## 2018-06-02 NOTE — PROGRESS NOTES
[facility]        Subjective: ESRD  Pt without specific complaints, but mildly confused. Not oriented to time. Review of Systems -   General ROS: negative for - fever, chills  Respiratory ROS: no SOB, cough, GODINEZ  Cardiovascular ROS: no CP, palpitations  Gastrointestinal ROS: no N&V, abdominal pain, diarrhea  Genito-Urinary ROS: no difficulty voiding, dysuria  Neurological ROS: no seizures, focal weekness        Objective:    Vitals:    06/02/18 0445 06/02/18 0545 06/02/18 0645 06/02/18 0800   BP: 102/49 92/44 112/46    Pulse: 90 84 88    Resp: 23 16 24    Temp:    98.7 °F (37.1 °C)   SpO2: 97% 95% 95%    Weight:       Height:           PE  Gen: in no acute distress  CV:reg rate  Chest:clear  Abd: soft  Ext/Access: rt IJ tunneled HD cath,  Rt thigh graft ok       . LAB  Recent Labs      06/01/18   1659   WBC  7.4   HGB  11.1*   HCT  34.3*   PLT  142*     Recent Labs      06/01/18   1659   NA  137   K  7.6*   CL  99   CO2  23   GLU  78   BUN  67*   CREA  15.10*   MG  3.1*   CA  8.8           Radiology    A/P:   Patient Active Problem List   Diagnosis Code    ESRD on dialysis ([facility] N18.6, Z99.2    Anemia associated with chronic renal failure D63.1    COPD (chronic obstructive pulmonary disease) ([facility] J44.9    Hypertension I10    Benzodiazepine dependence ([facility] F13.20    Non compliance with medical treatment Z91.19    Clotted dialysis access (HCC) T82.49XA    Class 2 severe obesity due to excess calories with serious comorbidity in adult ([facility] E66.01    Dyslipidemia E78.5    Secondary pulmonary hypertension TGQ1481    Rheumatic mitral regurgitation I05.1    Rheumatic aortic stenosis I06.0    Hyperkalemia E87.5     ESRD - she dialyzed last pm for 3 hours.   Awaiting this am's labs to decide on whether she needs more dialysis today  AMS -  HTN -   Loida Kincaid MD

## 2018-06-02 NOTE — PROGRESS NOTES
Patient awake and alert. Oriented x2. Follow commands. Speech is clear. Denies pain. Head of bed elevated. Oxygen saturation 99 % on room air. Patient receiving Hemodialysis. Dual skin assessment completed with Emily CUETO. New access in right thigh. This was place on Tuesday. Positive thrill and bruit. Redness noted. Several old scars noted. Allevyn place to sacral/coccyx. Left ankle with swelling and sore to touch. Bed in low/lock position. Call light within reach.

## 2018-06-02 NOTE — PROGRESS NOTES
Hospitalist Progress Note     Admit Date:  2018  4:32 PM   Name:  Scarlet Yang   Age:  70 y.o.  :  1950   MRN:  [id number]   PCP:  Pancho Humphrey MD  Treatment Team: Attending Provider: Brent Osorio MD; Utilization Review: Partha Loya    Subjective:     Ms. Eryn Payan is a 69 yo female with PMH of ESRD on HD who missed her 18 dialysis (states was not picked up) and is s/p fall after slipping to the floor and unable to get up. She was evaluated for weakness and found to have hyperkalemia (7.6) and admitted for urgent dialysis in ICU. nephro to review followup labs to determine further inpatient HD needs. She denies frequent falls, does not use walker/cane in her home. Plans are for home once discharged       18  Has some chronic left ankle/foot pain without trauma, denies dyspnea or cough, ate some,       Objective:   Patient Vitals for the past 24 hrs:   Temp Pulse Resp BP SpO2   18 0845 - 85 15 135/56 96 %   18 0825 - 90 20 133/59 95 %   18 0800 98.7 °F (37.1 °C) - - - -   18 0745 - 85 26 98/40 95 %   18 0645 - 88 24 112/46 95 %   18 0545 - 84 16 92/44 95 %   18 0445 - 90 23 102/49 97 %   18 0419 - 89 25 93/43 97 %   18 0345 - 87 17 (!) 73/31 93 %   18 0316 - 88 21 102/50 95 %   18 0233 97.9 °F (36.6 °C) 95 21 110/62 96 %   18 0202 - 91 15 100/45 95 %   18 0136 - 94 18 126/73 97 %   18 0122 - 95 16 106/54 96 %   18 0106 - 92 (!) 32 136/54 97 %   18 0101 - 94 25 136/54 96 %   18 0046 - 92 19 118/45 98 %   18 0032 - 85 27 92/59 99 %   18 0029 - - 18 - -   18 0017 - 83 27 (!) 101/36 99 %   18 0005 - 86 13 (!) 93/31 100 %   18 0001 - 83 12 (!) 93/31 100 %   18 2346 - 85 25 100/41 99 %   18 2335 - 82 18 90/47 96 %   18 2325 - 86 19 (!) 133/99 96 %   18 2316 - 84 (!) 39 (!) 133/99 99 %   18 2301 - 83 25 (!) 108/39 98 %   06/01/18 2257 - 91 22 97/48 95 %   06/01/18 2245 - 80 16 97/48 100 %   06/01/18 2234 - 84 - 101/43 95 %   06/01/18 2230 - 82 30 101/43 98 %   06/01/18 2219 - 83 14 91/61 93 %   06/01/18 2215 - 82 15 97/55 95 %   06/01/18 2210 97.9 °F (36.6 °C) 88 20 97/55 95 %   06/01/18 2200 - 80 - 91/43 -   06/01/18 2139 97.9 °F (36.6 °C) 91 22 - 96 %   06/01/18 2046 - 89 - 112/46 96 %   06/01/18 2021 - 89 (!) 40 100/52 96 %   06/01/18 1951 - 91 - 118/53 96 %   06/01/18 1851 - 87 (!) 32 95/44 96 %   06/01/18 1751 - 85 27 99/52 92 %   06/01/18 1706 - - - - 98 %   06/01/18 1651 - 85 26 119/55 98 %   06/01/18 1647 - 86 - 126/55 92 %   06/01/18 1618 99.5 °F (37.5 °C) 86 18 94/51 94 %     Oxygen Therapy  O2 Sat (%): 96 % (06/02/18 0845)  Pulse via Oximetry: 86 beats per minute (06/02/18 0845)  O2 Device: Room air (06/02/18 0233)    Intake/Output Summary (Last 24 hours) at 06/02/18 0924  Last data filed at 06/02/18 0106   Gross per 24 hour   Intake                0 ml   Output                0 ml   Net                0 ml         General:    Well nourished. Alert. No distress   CV:   RRR. No murmur, rub, or gallop. No edema, 2+ DP  Lungs:   CTAB. No wheezing, rhonchi, or rales. Abdomen:   Soft, nontender, nondistended. Extremities: Warm and dry. No cyanosis or edema. Left ankle tender to palpation, no obvious trauma   Skin:     No rashes or jaundice. Neuro:  No gross focal deficits    Data Review:  I have reviewed all labs, meds, telemetry events, and studies from the last 24 hours.     Recent Results (from the past 24 hour(s))   EKG, 12 LEAD, INITIAL    Collection Time: 06/01/18  4:52 PM   Result Value Ref Range    Ventricular Rate 83 BPM    Atrial Rate 83 BPM    P-R Interval 200 ms    QRS Duration 98 ms    Q-T Interval 356 ms    QTC Calculation (Bezet) 418 ms    Calculated P Axis 62 degrees    Calculated R Axis -19 degrees    Calculated T Axis 69 degrees    Diagnosis       !! AGE AND GENDER SPECIFIC ECG ANALYSIS !!  Normal sinus rhythm  Nonspecific ST abnormality  Abnormal ECG  When compared with ECG of 17-NOV-2014 22:11,  T wave amplitude has increased in Anterior leads  Confirmed by ST SHELIA HUGO MD (), MIGUEL JENKINS ([id number]) on 6/1/2018 9:28:39 PM     CBC WITH AUTOMATED DIFF    Collection Time: 06/01/18  4:59 PM   Result Value Ref Range    WBC 7.4 4.3 - 11.1 K/uL    RBC 3.96 (L) 4.05 - 5.25 M/uL    HGB 11.1 (L) 11.7 - 15.4 g/dL    HCT 34.3 (L) 35.8 - 46.3 %    MCV 86.6 79.6 - 97.8 FL    MCH 28.0 26.1 - 32.9 PG    MCHC 32.4 31.4 - 35.0 g/dL    RDW 14.5 11.9 - 14.6 %    PLATELET 230 (L) 372 - 450 K/uL    MPV 10.4 (L) 10.8 - 14.1 FL    DF AUTOMATED      NEUTROPHILS 64 43 - 78 %    LYMPHOCYTES 23 13 - 44 %    MONOCYTES 10 4.0 - 12.0 %    EOSINOPHILS 3 0.5 - 7.8 %    BASOPHILS 0 0.0 - 2.0 %    IMMATURE GRANULOCYTES 0 0.0 - 5.0 %    ABS. NEUTROPHILS 4.7 1.7 - 8.2 K/UL    ABS. LYMPHOCYTES 1.7 0.5 - 4.6 K/UL    ABS. MONOCYTES 0.7 0.1 - 1.3 K/UL    ABS. EOSINOPHILS 0.2 0.0 - 0.8 K/UL    ABS. BASOPHILS 0.0 0.0 - 0.2 K/UL    ABS. IMM.  GRANS. 0.0 0.0 - 0.5 K/UL   METABOLIC PANEL, BASIC    Collection Time: 06/01/18  4:59 PM   Result Value Ref Range    Sodium 137 136 - 145 mmol/L    Potassium 7.6 (HH) 3.5 - 5.1 mmol/L    Chloride 99 98 - 107 mmol/L    CO2 23 21 - 32 mmol/L    Anion gap 15 7 - 16 mmol/L    Glucose 78 65 - 100 mg/dL    BUN 67 (H) 8 - 23 MG/DL    Creatinine 15.10 (H) 0.6 - 1.0 MG/DL    GFR est AA 3 (L) >60 ml/min/1.73m2    GFR est non-AA 3 (L) >60 ml/min/1.73m2    Calcium 8.8 8.3 - 10.4 MG/DL   MAGNESIUM    Collection Time: 06/01/18  4:59 PM   Result Value Ref Range    Magnesium 3.1 (H) 1.8 - 2.4 mg/dL   CK    Collection Time: 06/01/18  4:59 PM   Result Value Ref Range     21 - 215 U/L   POC TROPONIN-I    Collection Time: 06/01/18  5:09 PM   Result Value Ref Range    Troponin-I (POC) 0.02 0.02 - 0.05 ng/ml   POC LACTIC ACID    Collection Time: 06/01/18  5:11 PM   Result Value Ref Range    Lactic Acid (POC) 2.0 (H) 0.5 - 1.9 mmol/L   METABOLIC PANEL, BASIC    Collection Time: 06/02/18  8:11 AM   Result Value Ref Range    Sodium 139 136 - 145 mmol/L    Potassium 5.9 (H) 3.5 - 5.1 mmol/L    Chloride 100 98 - 107 mmol/L    CO2 28 21 - 32 mmol/L    Anion gap 11 7 - 16 mmol/L    Glucose 67 65 - 100 mg/dL    BUN 26 (H) 8 - 23 MG/DL    Creatinine 7.99 (H) 0.6 - 1.0 MG/DL    GFR est AA 6 (L) >60 ml/min/1.73m2    GFR est non-AA 5 (L) >60 ml/min/1.73m2    Calcium 8.7 8.3 - 10.4 MG/DL   CBC WITH AUTOMATED DIFF    Collection Time: 06/02/18  8:11 AM   Result Value Ref Range    WBC 5.1 4.3 - 11.1 K/uL    RBC 3.43 (L) 4.05 - 5.25 M/uL    HGB 9.6 (L) 11.7 - 15.4 g/dL    HCT 29.4 (L) 35.8 - 46.3 %    MCV 85.7 79.6 - 97.8 FL    MCH 28.0 26.1 - 32.9 PG    MCHC 32.7 31.4 - 35.0 g/dL    RDW 14.1 11.9 - 14.6 %    PLATELET 539 (L) 346 - 450 K/uL    MPV 10.4 (L) 10.8 - 14.1 FL    DF AUTOMATED      NEUTROPHILS 62 43 - 78 %    LYMPHOCYTES 23 13 - 44 %    MONOCYTES 11 4.0 - 12.0 %    EOSINOPHILS 4 0.5 - 7.8 %    BASOPHILS 0 0.0 - 2.0 %    IMMATURE GRANULOCYTES 0 0.0 - 5.0 %    ABS. NEUTROPHILS 3.2 1.7 - 8.2 K/UL    ABS. LYMPHOCYTES 1.2 0.5 - 4.6 K/UL    ABS. MONOCYTES 0.6 0.1 - 1.3 K/UL    ABS. EOSINOPHILS 0.2 0.0 - 0.8 K/UL    ABS. BASOPHILS 0.0 0.0 - 0.2 K/UL    ABS. IMM.  GRANS. 0.0 0.0 - 0.5 K/UL        All Micro Results     None          Current Meds:  Current Facility-Administered Medications   Medication Dose Route Frequency    ALPRAZolam (XANAX) tablet 0.5 mg  0.5 mg Oral TID PRN    aspirin delayed-release tablet 81 mg  81 mg Oral DAILY    cinacalcet (SENSIPAR) tablet 30 mg  30 mg Oral QHS    cloNIDine HCl (CATAPRES) tablet 0.2 mg  0.2 mg Oral BID    clopidogrel (PLAVIX) tablet 75 mg  75 mg Oral DAILY    sevelamer (RENAGEL) tablet 800 mg  800 mg Oral TID    traMADol (ULTRAM) tablet 50 mg  50 mg Oral TID PRN    sodium chloride (NS) flush 5-10 mL  5-10 mL IntraVENous Q8H    sodium chloride (NS) flush 5-10 mL  5-10 mL IntraVENous PRN    acetaminophen (TYLENOL) tablet 650 mg  650 mg Oral Q4H PRN    HYDROcodone-acetaminophen (NORCO)  mg tablet 1 Tab  1 Tab Oral Q4H PRN    naloxone (NARCAN) injection 0.4 mg  0.4 mg IntraVENous PRN    diphenhydrAMINE (BENADRYL) capsule 25 mg  25 mg Oral Q4H PRN    prochlorperazine (COMPAZINE) injection 5 mg  5 mg IntraVENous Q8H PRN    bisacodyl (DULCOLAX) tablet 5 mg  5 mg Oral DAILY PRN    heparin (porcine) injection 5,000 Units  5,000 Units SubCUTAneous Q8H       Diet:  DIET REGULAR    Other Studies (last 24 hours):  Xr Chest Port    Result Date: 6/1/2018  CHEST RADIOGRAPH, 1 views, 6/1/2018 History: Shortness of breath. Technique: Portable frontal view of the chest. Comparison: Chest radiograph 5/23/2017 Findings: A right internal jugular dialysis catheter is seen. The heart is not significantly enlarged given AP technique. Lungs are expanded without pneumothorax. No consolidation, or pleural effusion is seen. IMPRESSION: 1. No acute findings evident by plain film imaging.        Assessment and Plan:     Hospital Problems as of 6/2/2018  Date Reviewed: 10/12/2017          Codes Class Noted - Resolved POA    Hyperkalemia ICD-10-CM: E87.5  ICD-9-CM: 276.7  6/1/2018 - Present Yes        Class 2 severe obesity due to excess calories with serious comorbidity in adult [facility]) (Chronic) ICD-10-CM: E66.01  ICD-9-CM: 278.01  5/26/2017 - Present Yes        * (Principal)ESRD on dialysis ([facility] (Chronic) ICD-10-CM: N18.6, Z99.2  ICD-9-CM: 585.6, V45.11  6/16/2010 - Present Yes        COPD (chronic obstructive pulmonary disease) (HCC) (Chronic) ICD-10-CM: J44.9  ICD-9-CM: 496  6/16/2010 - Present Yes        Hypertension (Chronic) ICD-10-CM: I10  ICD-9-CM: 401.9  6/16/2010 - Present Yes              PLAN:    · ESRD on HD, with acute hyperkalemia: after missing session, potassium now improved to 5.9, appreciate nephrology determination regarding further HD needs while admitted   · Left ankle pain: check uric acid, no obvious trauma   · Debility: fall risk, PT/OT/ case management consults   · HTN: held losartan due to hyperkalemia, continued clonidine, prn hydralazine   · Ok for floor transfer remote tele today     DC planning/Dispo:    Diet:  DIET REGULAR  DVT ppx:  Heparin       Signed:  Sally Garcia MD

## 2018-06-02 NOTE — PROGRESS NOTES
Right hand with 4th digit with darkening at tip for weeks ,patient describes some pain for weeks,  has good right radial pulse , will discuss with vascular dr Brian Kent in [place] MD

## 2018-06-02 NOTE — PROGRESS NOTES
Patient to ask family member to bring home medication in. Not sure of all medication and dosage. Will inform dayshift.

## 2018-06-02 NOTE — ED NOTES
Attempted to call report to ICU for pt transfer. Nurse receiving pt was on phone with another nurse.  ICU RN to call back for report

## 2018-06-02 NOTE — DIALYSIS
Hemodialysis begun via right perm cath without difficulty. Settings per orders. Patient is alert and denies complaints. Will continue to monitor while on dialysis.

## 2018-06-03 ENCOUNTER — APPOINTMENT (OUTPATIENT)
Dept: GENERAL RADIOLOGY | Age: 68
DRG: 981 | End: 2018-06-03
Attending: INTERNAL MEDICINE
Payer: MEDICARE

## 2018-06-03 LAB
ALBUMIN SERPL-MCNC: 2.3 G/DL (ref 3.2–4.6)
ANION GAP SERPL CALC-SCNC: 11 MMOL/L (ref 7–16)
BASOPHILS # BLD: 0 K/UL (ref 0–0.2)
BASOPHILS NFR BLD: 1 % (ref 0–2)
BUN SERPL-MCNC: 37 MG/DL (ref 8–23)
CALCIUM SERPL-MCNC: 8.6 MG/DL (ref 8.3–10.4)
CHLORIDE SERPL-SCNC: 103 MMOL/L (ref 98–107)
CO2 SERPL-SCNC: 26 MMOL/L (ref 21–32)
CREAT SERPL-MCNC: 9.94 MG/DL (ref 0.6–1)
DIFFERENTIAL METHOD BLD: ABNORMAL
EOSINOPHIL # BLD: 0.3 K/UL (ref 0–0.8)
EOSINOPHIL NFR BLD: 6 % (ref 0.5–7.8)
ERYTHROCYTE [DISTWIDTH] IN BLOOD BY AUTOMATED COUNT: 14.2 % (ref 11.9–14.6)
GLUCOSE SERPL-MCNC: 65 MG/DL (ref 65–100)
HCT VFR BLD AUTO: 29.2 % (ref 35.8–46.3)
HGB BLD-MCNC: 9.4 G/DL (ref 11.7–15.4)
IMM GRANULOCYTES # BLD: 0 K/UL (ref 0–0.5)
IMM GRANULOCYTES NFR BLD AUTO: 0 % (ref 0–5)
LYMPHOCYTES # BLD: 1.2 K/UL (ref 0.5–4.6)
LYMPHOCYTES NFR BLD: 19 % (ref 13–44)
MCH RBC QN AUTO: 27.7 PG (ref 26.1–32.9)
MCHC RBC AUTO-ENTMCNC: 32.2 G/DL (ref 31.4–35)
MCV RBC AUTO: 86.1 FL (ref 79.6–97.8)
MONOCYTES # BLD: 0.9 K/UL (ref 0.1–1.3)
MONOCYTES NFR BLD: 14 % (ref 4–12)
NEUTS SEG # BLD: 3.7 K/UL (ref 1.7–8.2)
NEUTS SEG NFR BLD: 60 % (ref 43–78)
PHOSPHATE SERPL-MCNC: 6.4 MG/DL (ref 2.3–3.7)
PLATELET # BLD AUTO: 160 K/UL (ref 150–450)
PMV BLD AUTO: 10 FL (ref 10.8–14.1)
POTASSIUM SERPL-SCNC: 5.9 MMOL/L (ref 3.5–5.1)
RBC # BLD AUTO: 3.39 M/UL (ref 4.05–5.25)
SODIUM SERPL-SCNC: 140 MMOL/L (ref 136–145)
WBC # BLD AUTO: 6.1 K/UL (ref 4.3–11.1)

## 2018-06-03 PROCEDURE — 85025 COMPLETE CBC W/AUTO DIFF WBC: CPT | Performed by: INTERNAL MEDICINE

## 2018-06-03 PROCEDURE — 74011250636 HC RX REV CODE- 250/636: Performed by: FAMILY MEDICINE

## 2018-06-03 PROCEDURE — 74011250637 HC RX REV CODE- 250/637: Performed by: INTERNAL MEDICINE

## 2018-06-03 PROCEDURE — 36415 COLL VENOUS BLD VENIPUNCTURE: CPT | Performed by: INTERNAL MEDICINE

## 2018-06-03 PROCEDURE — 74011250637 HC RX REV CODE- 250/637: Performed by: FAMILY MEDICINE

## 2018-06-03 PROCEDURE — 97162 PT EVAL MOD COMPLEX 30 MIN: CPT

## 2018-06-03 PROCEDURE — 73630 X-RAY EXAM OF FOOT: CPT

## 2018-06-03 PROCEDURE — 65270000029 HC RM PRIVATE

## 2018-06-03 PROCEDURE — 80069 RENAL FUNCTION PANEL: CPT | Performed by: INTERNAL MEDICINE

## 2018-06-03 RX ADMIN — CLOPIDOGREL BISULFATE 75 MG: 75 TABLET ORAL at 08:18

## 2018-06-03 RX ADMIN — MIDODRINE HYDROCHLORIDE 10 MG: 5 TABLET ORAL at 17:14

## 2018-06-03 RX ADMIN — ASPIRIN 81 MG: 81 TABLET, COATED ORAL at 08:18

## 2018-06-03 RX ADMIN — MIDODRINE HYDROCHLORIDE 10 MG: 5 TABLET ORAL at 11:47

## 2018-06-03 RX ADMIN — HYDROCODONE BITARTRATE AND ACETAMINOPHEN 1 TABLET: 10; 325 TABLET ORAL at 16:27

## 2018-06-03 RX ADMIN — Medication 10 ML: at 13:42

## 2018-06-03 RX ADMIN — RENAGEL 800 MG: 400 TABLET ORAL at 08:18

## 2018-06-03 RX ADMIN — RENAGEL 800 MG: 400 TABLET ORAL at 17:14

## 2018-06-03 RX ADMIN — MENTHOL, METHYL SALICYLATE: 10; 15 CREAM TOPICAL at 08:18

## 2018-06-03 RX ADMIN — RENAGEL 800 MG: 400 TABLET ORAL at 21:06

## 2018-06-03 RX ADMIN — HEPARIN SODIUM 5000 UNITS: 5000 INJECTION, SOLUTION INTRAVENOUS; SUBCUTANEOUS at 13:42

## 2018-06-03 RX ADMIN — Medication 10 ML: at 21:07

## 2018-06-03 RX ADMIN — HEPARIN SODIUM 5000 UNITS: 5000 INJECTION, SOLUTION INTRAVENOUS; SUBCUTANEOUS at 21:06

## 2018-06-03 RX ADMIN — Medication 10 ML: at 06:24

## 2018-06-03 RX ADMIN — MENTHOL, METHYL SALICYLATE: 10; 15 CREAM TOPICAL at 21:06

## 2018-06-03 RX ADMIN — MIDODRINE HYDROCHLORIDE 10 MG: 5 TABLET ORAL at 08:18

## 2018-06-03 RX ADMIN — HYDROCODONE BITARTRATE AND ACETAMINOPHEN 1 TABLET: 10; 325 TABLET ORAL at 04:23

## 2018-06-03 RX ADMIN — CINACALCET HYDROCHLORIDE 30 MG: 30 TABLET, COATED ORAL at 21:06

## 2018-06-03 RX ADMIN — HEPARIN SODIUM 5000 UNITS: 5000 INJECTION, SOLUTION INTRAVENOUS; SUBCUTANEOUS at 06:23

## 2018-06-03 NOTE — INTERDISCIPLINARY ROUNDS
Interdisciplinary team rounds were held 6/3/2018 with the following team members:Physician. Plan of care discussed. See clinical pathway and/or care plan for interventions and desired outcomes.

## 2018-06-03 NOTE — PROGRESS NOTES
Bedside, Verbal and Written shift change report given to Zehra Madrid RN by Levi Staley RN.  Report included the following information SBAR, Kardex, ED Summary, Intake/Output, MAR, Accordion, Recent Results, Med Rec Status and Cardiac Rhythm SR.

## 2018-06-03 NOTE — PROGRESS NOTES
Bengay cream applied to hands for c/o arthritic aching. Norco given 10 mg po for c/o pain to right leg. Recent (5/29) av access to right leg. Pos for bruit and thrill. Site is blistered and intact. Warmth and redness noted. Blood pressure labile. SBP  80s-140s. Asymptomatic of lower BP readings.

## 2018-06-03 NOTE — PROGRESS NOTES
Problem: Mobility Impaired (Adult and Pediatric)  Goal: *Acute Goals and Plan of Care (Insert Text)  Goals:  (1.)Ms. Brandt Ro will move from supine to sit and sit to supine , scoot up and down and roll side to side with MODIFIED INDEPENDENCE within 5 treatment day(s). (2.)Ms. Brandt Ro will transfer from bed to chair and chair to bed with MODIFIED INDEPENDENCE using the least restrictive device within 5 treatment day(s). (3.)Ms. Brandt Ro will ambulate with SUPERVISION for  feet with the least restrictive device within 5 treatment day(s). PHYSICAL THERAPY: Initial Assessment, Treatment Day: Day of Assessment, PM 6/3/2018  INPATIENT: Hospital Day: 3  [place] / Product Type: Medicare /      NAME/AGE/GENDER: Rodo Campbell is a 70 y.o. female   PRIMARY DIAGNOSIS: ESRD on dialysis ([address]  ESRD on dialysis ([address]  Hyperkalemia ESRD on dialysis ([address] ESRD on dialysis ([address]        ICD-10: Treatment Diagnosis:      · Generalized Muscle Weakness (M62.81)  · Difficulty in walking, Not elsewhere classified (R26.2)  · Other abnormalities of gait and mobility (R26.89)   Precaution/Allergies:  Vancomycin      ASSESSMENT:     Ms. Brandt Ro presents is a 70year old AAF with an admitting diagnosis of ESRD on dialysis. She presents today sitting up in the bed agreeable to have therapy. She reports she lives with a friend and prior to this admission has been able to ambulate in her home environment with use of r/walker. She is concerned about her LLE ankle discomfort and about the blister that has come up at the AV access on the right upper thigh. She requires min/mod assist with bed mobility, sit to stand and SPT from the bed to the recliner. She is slow and methodical to move and has her own way of moving herself. She tend to stay in trunk flexion with standing and SPT. She moves her weight well given time and allowing her to do it her way.   Ms. Brandt Ro appears to be functioning below her PLOF and would benefit from continued skilled PT to maximize her functional abilities. She is pleasant and cooperative and preoccupied at times during therapy today. This section established at most recent assessment   PROBLEM LIST (Impairments causing functional limitations):  1. Decreased Strength  2. Decreased ADL/Functional Activities  3. Decreased Transfer Abilities  4. Decreased Ambulation Ability/Technique  5. Increased Fatigue   INTERVENTIONS PLANNED: (Benefits and precautions of physical therapy have been discussed with the patient.)  1. Bed Mobility  2. Gait Training  3. Therapeutic Activites  4. Therapeutic Exercise/Strengthening  5. Group Therapy     TREATMENT PLAN: Frequency/Duration: 3 times a week for duration of hospital stay  Rehabilitation Potential For Stated Goals: Good     RECOMMENDED REHABILITATION/EQUIPMENT: (at time of discharge pending progress): Due to the probability of continued deficits (see above) this patient will not likely need continued skilled physical therapy after discharge. Equipment:    None at this time              HISTORY:   History of Present Injury/Illness (Reason for Referral):  Patient is a 70 y.o. female who presents to the ER due to weakness. Her family found her on the floor earlier today. She denies a fall, just slumped to floor; but was too weak to get back up. She reports missing last 2 dialysis sessions due to transportation issues. Denies fever/chills, n/v/d, chest or abdominal pain. ER workup does show peaked T waves and hyperkalemia. They have called nephrology, and they plan to dialyze tonight.     Past Medical History/Comorbidities:   Ms. Shawanda Alejandra  has a past medical history of Chronic kidney disease; Dialysis patient [facility]); GERD (gastroesophageal reflux disease); Hyperlipidemia; Hypertension (6/16/2010); Hypertension; Kidney failure; Liver disease; Obesity; Other ill-defined conditions(799.89);  Peripheral neuropathy; and Psychotic disorder. She also has no past medical history of Adverse effect of anesthesia; Arthritis; Autoimmune disease ([facility] DEMENTIA; Difficult intubation; Endocrine disease; Gastrointestinal disorder; Heart failure ([facility] Infectious disease; Malignant hyperthermia due to anesthesia; Nausea & vomiting; Neurological disorder; Pseudocholinesterase deficiency; Psychiatric disorder; PUD (peptic ulcer disease); Seizures ([facility] Sleep disorder; Stroke [facility]); or Thromboembolus ([facility] Ms. Moe Mendes  has a past surgical history that includes hx urological and hx other surgical.  Social History/Living Environment:   Home Environment: Private residence  # Steps to Enter: 5  One/Two Story Residence: [facility]: Both  Lift Chair Available: No  Living Alone: Yes  Support Systems: Child(esperanza), [employer]ism / elan community  Patient Expects to be Discharged to[de-identified] Private residence  Current DME Used/Available at Home: Walker, rolling  Prior Level of Function/Work/Activity:  Patient reports that she has been using her r/walker at home for her mobility and at times, when in the house, she does not use anything. She reports she typically walks very bent over with increased trunk flexion. Number of Personal Factors/Comorbidities that affect the Plan of Care: 3+: HIGH COMPLEXITY   EXAMINATION:   Most Recent Physical Functioning:   Gross Assessment:  AROM: Generally decreased, functional  PROM: Generally decreased, functional  Strength: Generally decreased, functional  Coordination: Generally decreased, functional  Tone: Normal  Sensation: Intact               Posture:  Posture (WDL): Exceptions to WDL  Posture Assessment: Forward head, Rounded shoulders, Trunk flexion  Balance:  Sitting: Intact  Standing: Impaired  Standing - Static: Fair;Constant support  Standing - Dynamic : Fair Bed Mobility:  Rolling: Minimum assistance  Supine to Sit: Moderate assistance  Scooting:  Moderate assistance  Wheelchair Mobility: Transfers:  Sit to Stand: Moderate assistance  Stand to Sit: Minimum assistance  Bed to Chair: Minimum assistance; Moderate assistance  Gait:     Base of Support: Widened  Speed/Jaylin: Slow;Shuffled  Step Length: Left shortened;Right shortened  Gait Abnormalities: Decreased step clearance; Other (significant trunk flexion)  Distance (ft): 6 Feet (ft) (5-6 small steps bed to recliner)  Assistive Device: Walker, rolling  Ambulation - Level of Assistance: Minimal assistance; Moderate assistance  Interventions: Safety awareness training;Manual cues; Verbal cues      Body Structures Involved:  1. Metabolic  2. Endocrine Body Functions Affected:  1. Movement Related  2. None Activities and Participation Affected:  1. General Tasks and Demands  2. Mobility  3. Self Care   Number of elements that affect the Plan of Care: 3: MODERATE COMPLEXITY   CLINICAL PRESENTATION:   Presentation: Evolving clinical presentation with changing clinical characteristics: MODERATE COMPLEXITY   CLINICAL DECISION MAKIN [facility] Mobility Inpatient Short Form  How much difficulty does the patient currently have. .. Unable A Lot A Little None   1. Turning over in bed (including adjusting bedclothes, sheets and blankets)? [] 1   [] 2   [x] 3   [] 4   2. Sitting down on and standing up from a chair with arms ( e.g., wheelchair, bedside commode, etc.)   [] 1   [x] 2   [] 3   [] 4   3. Moving from lying on back to sitting on the side of the bed? [] 1   [x] 2   [] 3   [] 4   How much help from another person does the patient currently need. .. Total A Lot A Little None   4. Moving to and from a bed to a chair (including a wheelchair)? [] 1   [] 2   [x] 3   [] 4   5. Need to walk in hospital room? [] 1   [x] 2   [] 3   [] 4   6. Climbing 3-5 steps with a railing? [x] 1   [] 2   [] 3   [] 4   © , Trustees of [facility] [employer], under license to [employer].  All rights reserved      Score: Initial: 13 Most Recent: X (Date: -- )    Interpretation of Tool:  Represents activities that are increasingly more difficult (i.e. Bed mobility, Transfers, Gait). Score 24 23 22-20 19-15 14-10 9-7 6     Modifier CH CI CJ CK CL CM CN      ? Mobility - Walking and Moving Around:     - CURRENT STATUS: CL - 60%-79% impaired, limited or restricted    - GOAL STATUS: CK - 40%-59% impaired, limited or restricted    - D/C STATUS:  ---------------To be determined---------------  [place] / Product Type: Medicare /      Medical Necessity:     · Patient is expected to demonstrate progress in strength, balance and functional technique to decrease assistance required with bed mobility, SPT and gait with r/walker. Reason for Services/Other Comments:  · Patient continues to require skilled intervention due to medical complications. Use of outcome tool(s) and clinical judgement create a POC that gives a: Questionable prediction of patient's progress: MODERATE COMPLEXITY            TREATMENT:   (In addition to Assessment/Re-Assessment sessions the following treatments were rendered)   Pre-treatment Symptoms/Complaints:  Patient complained of LLE foot pain at anterior ankle and on LLE upper thigh. Pain: Initial:   Pain Intensity 1: 3  Pain Location 1: Foot  Pain Orientation 1: Left  Post Session:  3/10     Assessment/Reassessment only, no treatment provided today    Braces/Orthotics/Lines/Etc:   · ICU monitoring  · O2 Device: Room air  Treatment/Session Assessment:    · Response to Treatment:  Patient participated and tolerated therapy well today.   She has her own way of moving herself so additional time is needed but she can sit/stand and transfer with min/mod assist.   · Interdisciplinary Collaboration:   o Physical Therapist  o Registered Nurse  · After treatment position/precautions:   o Up in chair  o Bed/Chair-wheels locked  o Call light within reach  o RN notified   · Compliance with Program/Exercises: Will assess as treatment progresses. · Recommendations/Intent for next treatment session: \"Next visit will focus on advancements to more challenging activities and reduction in assistance provided\".   Total Treatment Duration:  PT Patient Time In/Time Out  Time In: 1313  Time Out: 812 [facility],  [address] Tati Yañez

## 2018-06-03 NOTE — PROGRESS NOTES
[facility]        Subjective: ESRD  Pt without specific complaints,  Still not oriented to time. Review of Systems -   General ROS: negative for - fever, chills  Respiratory ROS: no SOB, cough, GODINEZ  Cardiovascular ROS: no CP, palpitations  Gastrointestinal ROS: no N&V, abdominal pain, diarrhea  Genito-Urinary ROS: no difficulty voiding, dysuria  Neurological ROS: no seizures, focal weekness        Objective:    Vitals:    06/03/18 0601 06/03/18 0701 06/03/18 0801 06/03/18 0818   BP: 120/50 103/44 116/53 116/53   Pulse: 74 82 82 88   Resp: (!) 54 22 15    Temp:  99.4 °F (37.4 °C)     SpO2: 93% 96% 95%    Weight:       Height:           PE  Gen: in no acute distress  CV:reg rate  Chest:clear  Abd: soft  Ext/Access: rt IJ tunneled HD cath,  Rt thigh graft ok       . LAB  Recent Labs      06/03/18   0329  06/02/18   0811  06/01/18   1659   WBC  6.1  5.1  7.4   HGB  9.4*  9.6*  11.1*   HCT  29.2*  29.4*  34.3*   PLT  160  133*  142*     Recent Labs      06/03/18   0329 06/02/18   0811  06/01/18   1659   NA  140  139  137   K  5.9*  5.9*  7.6*   CL  103  100  99   CO2  26  28  23   GLU  65  67  78   BUN  37*  26*  67*   CREA  9.94*  7.99*  15.10*   MG   --    --   3.1*   PHOS  6.4*   --    --    CA  8.6  8.7  8.8   ALB  2.3*   --    --            Radiology    A/P:   Patient Active Problem List   Diagnosis Code    ESRD on dialysis ([address] N18.6, Z99.2    Anemia associated with chronic renal failure D63.1    COPD (chronic obstructive pulmonary disease) ([facility]) J44.9    Hypertension I10    Benzodiazepine dependence ([facility]) F13.20    Non compliance with medical treatment Z91.19    Clotted dialysis access ([facility]) T82.49XA    Class 2 severe obesity due to excess calories with serious comorbidity in adult ([facility]) E66.01    Dyslipidemia E78.5    Secondary pulmonary hypertension [id number]    Rheumatic mitral regurgitation I05.1    Rheumatic aortic stenosis I06.0    Hyperkalemia E87.5     ESRD - for dialysis again tomorrow  AMS - stable  HTN - low  Anemia - monitoring stable        Cheryl Salas MD

## 2018-06-03 NOTE — PROGRESS NOTES
Hospitalist Progress Note     Admit Date:  2018  4:32 PM   Name:  Maria Teresa Chu   Age:  70 y.o.  :  1950   MRN:  [id number]   PCP:  Mouna Mcarthur MD  Treatment Team: Attending Provider: Danae Wooten MD; Consulting Provider: Nidhi Flynn MD; Utilization Review: Yael Portillo    Subjective:       Ms. Joseluis Thompson is a 69 yo female with PMH of ESRD on HD who missed her 18 dialysis (states was not picked up) and is s/p fall after slipping to the floor and unable to get up. She was evaluated for weakness and found to have hyperkalemia (7.6) and admitted for urgent dialysis in ICU. nephro to review followup labs to determine further inpatient HD needs with plans for HD . She has had borderline BP ranges likely due to calcified vessels. She is now on midodrine and antihypertensives stopped. She denies frequent falls, does not use walker/cane in her home. Has left foot pain with negative uric acid, xray left foot negative.    .Plans are for home once discharged       6-3-18  Has some chronic left ankle/foot pain without trauma, denies dyspnea or cough, ate some, has blistering at right thigh AV graft site, no BM change      Objective:     Patient Vitals for the past 24 hrs:   Temp Pulse Resp BP SpO2   18 1714 - 78 - 108/50 -   18 1501 98.4 °F (36.9 °C) 73 20 129/48 92 %   18 1430 - 72 27 108/44 92 %   18 1401 - 74 20 110/43 91 %   18 1338 - 73 18 120/47 94 %   18 1300 - 74 18 106/64 92 %   18 1200 - 75 17 (!) 83/62 92 %   18 1147 - 74 - 97/51 -   18 1130 - 74 (!) 33 97/51 90 %   18 1100 98.5 °F (36.9 °C) 77 22 97/50 (!) 89 %   18 1001 - 82 18 95/45 96 %   18 0905 - 86 21 106/48 93 %   18 0818 - 88 - 116/53 -   18 0801 - 82 15 116/53 95 %   18 0701 99.4 °F (37.4 °C) 82 22 103/44 96 %   18 0601 - 74 (!) 54 120/50 93 %   18 0502 - 72 15 109/42 98 %   18 0400 - 72 19 114/49 96 %   06/03/18 0302 - 72 18 119/47 94 %   06/03/18 0200 - 74 14 97/45 97 %   06/03/18 0003 - 70 17 112/51 -   06/02/18 2302 - 71 27 123/69 97 %   06/02/18 2201 - 70 (!) 32 135/50 95 %   06/02/18 2121 - 71 11 144/60 96 %   06/02/18 2101 - 71 18 (!) 84/39 93 %   06/02/18 2038 98.6 °F (37 °C) 68 - 101/42 96 %   06/02/18 2030 - 71 20 101/42 95 %   06/02/18 1935 - 75 26 110/51 97 %   06/02/18 1929 - 70 20 (!) 80/35 94 %   06/02/18 1900 - 68 18 (!) 95/37 97 %   06/02/18 1830 - 67 (!) 40 (!) 95/36 94 %     Oxygen Therapy  O2 Sat (%): 92 % (06/03/18 1501)  Pulse via Oximetry: 73 beats per minute (06/03/18 1501)  O2 Device: Room air (06/03/18 0701)    Intake/Output Summary (Last 24 hours) at 06/03/18 1819  Last data filed at 06/03/18 0818   Gross per 24 hour   Intake              200 ml   Output                0 ml   Net              200 ml         General:    Well nourished. Alert. No distress   CV:   RRR. No murmur, rub, or gallop. No edema  Lungs:   CTAB. No wheezing, rhonchi, or rales. Abdomen:   Soft, nontender, nondistended. Extremities: Warm and dry. No cyanosis or edema. Left ankle tender to palpation, no obvious trauma   Skin:     No rashes or jaundice. Neuro:  No gross focal deficits    Data Review:  I have reviewed all labs, meds, telemetry events, and studies from the last 24 hours.     Recent Results (from the past 24 hour(s))   CBC WITH AUTOMATED DIFF    Collection Time: 06/03/18  3:29 AM   Result Value Ref Range    WBC 6.1 4.3 - 11.1 K/uL    RBC 3.39 (L) 4.05 - 5.25 M/uL    HGB 9.4 (L) 11.7 - 15.4 g/dL    HCT 29.2 (L) 35.8 - 46.3 %    MCV 86.1 79.6 - 97.8 FL    MCH 27.7 26.1 - 32.9 PG    MCHC 32.2 31.4 - 35.0 g/dL    RDW 14.2 11.9 - 14.6 %    PLATELET 390 228 - 296 K/uL    MPV 10.0 (L) 10.8 - 14.1 FL    DF AUTOMATED      NEUTROPHILS 60 43 - 78 %    LYMPHOCYTES 19 13 - 44 %    MONOCYTES 14 (H) 4.0 - 12.0 %    EOSINOPHILS 6 0.5 - 7.8 %    BASOPHILS 1 0.0 - 2.0 %    IMMATURE GRANULOCYTES 0 0.0 - 5.0 % ABS. NEUTROPHILS 3.7 1.7 - 8.2 K/UL    ABS. LYMPHOCYTES 1.2 0.5 - 4.6 K/UL    ABS. MONOCYTES 0.9 0.1 - 1.3 K/UL    ABS. EOSINOPHILS 0.3 0.0 - 0.8 K/UL    ABS. BASOPHILS 0.0 0.0 - 0.2 K/UL    ABS. IMM.  GRANS. 0.0 0.0 - 0.5 K/UL   RENAL FUNCTION PANEL    Collection Time: 06/03/18  3:29 AM   Result Value Ref Range    Sodium 140 136 - 145 mmol/L    Potassium 5.9 (H) 3.5 - 5.1 mmol/L    Chloride 103 98 - 107 mmol/L    CO2 26 21 - 32 mmol/L    Anion gap 11 7 - 16 mmol/L    Glucose 65 65 - 100 mg/dL    BUN 37 (H) 8 - 23 MG/DL    Creatinine 9.94 (H) 0.6 - 1.0 MG/DL    GFR est AA 5 (L) >60 ml/min/1.73m2    GFR est non-AA 4 (L) >60 ml/min/1.73m2    Calcium 8.6 8.3 - 10.4 MG/DL    Phosphorus 6.4 (H) 2.3 - 3.7 MG/DL    Albumin 2.3 (L) 3.2 - 4.6 g/dL        All Micro Results     None          Current Meds:  Current Facility-Administered Medications   Medication Dose Route Frequency    hydrALAZINE (APRESOLINE) 20 mg/mL injection 10 mg  10 mg IntraVENous Q6H PRN    midodrine (PROAMITINE) tablet 10 mg  10 mg Oral TID WITH MEALS    methyl salicylate-menthol (BENGAY) 15-10 % cream   Topical Q12H    ALPRAZolam (XANAX) tablet 0.5 mg  0.5 mg Oral TID PRN    aspirin delayed-release tablet 81 mg  81 mg Oral DAILY    cinacalcet (SENSIPAR) tablet 30 mg  30 mg Oral QHS    clopidogrel (PLAVIX) tablet 75 mg  75 mg Oral DAILY    sevelamer (RENAGEL) tablet 800 mg  800 mg Oral TID    traMADol (ULTRAM) tablet 50 mg  50 mg Oral TID PRN    sodium chloride (NS) flush 5-10 mL  5-10 mL IntraVENous Q8H    sodium chloride (NS) flush 5-10 mL  5-10 mL IntraVENous PRN    acetaminophen (TYLENOL) tablet 650 mg  650 mg Oral Q4H PRN    HYDROcodone-acetaminophen (NORCO)  mg tablet 1 Tab  1 Tab Oral Q4H PRN    naloxone (NARCAN) injection 0.4 mg  0.4 mg IntraVENous PRN    diphenhydrAMINE (BENADRYL) capsule 25 mg  25 mg Oral Q4H PRN    prochlorperazine (COMPAZINE) injection 5 mg  5 mg IntraVENous Q8H PRN    bisacodyl (DULCOLAX) tablet 5 mg 5 mg Oral DAILY PRN    heparin (porcine) injection 5,000 Units  5,000 Units SubCUTAneous Q8H       Diet:  DIET REGULAR    Other Studies (last 24 hours):  Xr Foot Lt Min 3 V    Result Date: 6/3/2018  Three-view left Foot x-ray dated Irene 3, 2018 Reference Exam: None Indication: Foot pain Findings: 3 images are presented. No fracture nor dislocation nor foreign body is seen. Impression:  Normal foot x-rays. If a stress related injury is suspected MRI or nuclear medicine study should be considered.        Assessment and Plan:     Hospital Problems as of 6/3/2018  Date Reviewed: 10/12/2017          Codes Class Noted - Resolved POA    Hyperkalemia ICD-10-CM: E87.5  ICD-9-CM: 276.7  6/1/2018 - Present Yes        Class 2 severe obesity due to excess calories with serious comorbidity in adult [facility]) (Chronic) ICD-10-CM: E66.01  ICD-9-CM: 278.01  5/26/2017 - Present Yes        * (Principal)ESRD on dialysis [facility]) (Chronic) ICD-10-CM: N18.6, Z99.2  ICD-9-CM: 585.6, V45.11  6/16/2010 - Present Yes        COPD (chronic obstructive pulmonary disease) ([facility]) (Chronic) ICD-10-CM: J44.9  ICD-9-CM: 496  6/16/2010 - Present Yes        Hypertension (Chronic) ICD-10-CM: I10  ICD-9-CM: 401.9  6/16/2010 - Present Yes              PLAN:    · ESRD on HD, with acute hyperkalemia: after missing session, potassium now improved to 5.9, appreciate nephrology determination regarding further HD needs while admitted with plans for tomorrow  · Left ankle pain: xray negative  · Debility: fall risk, PT/OT/ case management consults   · Hypotension: held losartan / clonidine, added midodrine  · Hypoglycemia: check POC  · Ok for floor transfer remote tele today     DC planning/Dispo:    Diet:  DIET REGULAR  DVT ppx:  Heparin       Signed:  Ifeanyi Hurtado MD

## 2018-06-03 NOTE — PROGRESS NOTES
Pt sitting up in recliner watching TV in no distress. Denies pain. Vss. Transfer order noted, awaiting bed assignment.

## 2018-06-04 LAB
ALBUMIN SERPL-MCNC: 2.2 G/DL (ref 3.2–4.6)
ANION GAP SERPL CALC-SCNC: 17 MMOL/L (ref 7–16)
BASOPHILS # BLD: 0 K/UL (ref 0–0.2)
BASOPHILS NFR BLD: 0 % (ref 0–2)
BUN SERPL-MCNC: 45 MG/DL (ref 8–23)
CALCIUM SERPL-MCNC: 8.8 MG/DL (ref 8.3–10.4)
CHLORIDE SERPL-SCNC: 100 MMOL/L (ref 98–107)
CO2 SERPL-SCNC: 23 MMOL/L (ref 21–32)
CREAT SERPL-MCNC: 12.2 MG/DL (ref 0.6–1)
DIFFERENTIAL METHOD BLD: ABNORMAL
EOSINOPHIL # BLD: 0.5 K/UL (ref 0–0.8)
EOSINOPHIL NFR BLD: 7 % (ref 0.5–7.8)
ERYTHROCYTE [DISTWIDTH] IN BLOOD BY AUTOMATED COUNT: 14.2 % (ref 11.9–14.6)
GLUCOSE SERPL-MCNC: 63 MG/DL (ref 65–100)
HCT VFR BLD AUTO: 30 % (ref 35.8–46.3)
HGB BLD-MCNC: 9.6 G/DL (ref 11.7–15.4)
IMM GRANULOCYTES # BLD: 0 K/UL (ref 0–0.5)
IMM GRANULOCYTES NFR BLD AUTO: 0 % (ref 0–5)
LYMPHOCYTES # BLD: 1.7 K/UL (ref 0.5–4.6)
LYMPHOCYTES NFR BLD: 24 % (ref 13–44)
MCH RBC QN AUTO: 27.7 PG (ref 26.1–32.9)
MCHC RBC AUTO-ENTMCNC: 32 G/DL (ref 31.4–35)
MCV RBC AUTO: 86.7 FL (ref 79.6–97.8)
MONOCYTES # BLD: 0.9 K/UL (ref 0.1–1.3)
MONOCYTES NFR BLD: 13 % (ref 4–12)
NEUTS SEG # BLD: 4 K/UL (ref 1.7–8.2)
NEUTS SEG NFR BLD: 56 % (ref 43–78)
PHOSPHATE SERPL-MCNC: 6.8 MG/DL (ref 2.3–3.7)
PLATELET # BLD AUTO: 207 K/UL (ref 150–450)
PMV BLD AUTO: 10 FL (ref 10.8–14.1)
POTASSIUM SERPL-SCNC: 6.3 MMOL/L (ref 3.5–5.1)
RBC # BLD AUTO: 3.46 M/UL (ref 4.05–5.25)
SODIUM SERPL-SCNC: 140 MMOL/L (ref 136–145)
WBC # BLD AUTO: 7.1 K/UL (ref 4.3–11.1)

## 2018-06-04 PROCEDURE — 97530 THERAPEUTIC ACTIVITIES: CPT

## 2018-06-04 PROCEDURE — 36415 COLL VENOUS BLD VENIPUNCTURE: CPT | Performed by: INTERNAL MEDICINE

## 2018-06-04 PROCEDURE — 74011250636 HC RX REV CODE- 250/636: Performed by: FAMILY MEDICINE

## 2018-06-04 PROCEDURE — 74011250636 HC RX REV CODE- 250/636: Performed by: INTERNAL MEDICINE

## 2018-06-04 PROCEDURE — 74011250637 HC RX REV CODE- 250/637: Performed by: FAMILY MEDICINE

## 2018-06-04 PROCEDURE — 80069 RENAL FUNCTION PANEL: CPT | Performed by: INTERNAL MEDICINE

## 2018-06-04 PROCEDURE — 74011250637 HC RX REV CODE- 250/637: Performed by: INTERNAL MEDICINE

## 2018-06-04 PROCEDURE — 90935 HEMODIALYSIS ONE EVALUATION: CPT

## 2018-06-04 PROCEDURE — 97535 SELF CARE MNGMENT TRAINING: CPT

## 2018-06-04 PROCEDURE — 85025 COMPLETE CBC W/AUTO DIFF WBC: CPT | Performed by: INTERNAL MEDICINE

## 2018-06-04 PROCEDURE — 97165 OT EVAL LOW COMPLEX 30 MIN: CPT

## 2018-06-04 PROCEDURE — 65270000029 HC RM PRIVATE

## 2018-06-04 RX ORDER — HEPARIN SODIUM 1000 [USP'U]/ML
5000 INJECTION, SOLUTION INTRAVENOUS; SUBCUTANEOUS
Status: DISCONTINUED | OUTPATIENT
Start: 2018-06-04 | End: 2018-06-05 | Stop reason: HOSPADM

## 2018-06-04 RX ADMIN — Medication 5 ML: at 22:40

## 2018-06-04 RX ADMIN — CLOPIDOGREL BISULFATE 75 MG: 75 TABLET ORAL at 08:51

## 2018-06-04 RX ADMIN — Medication 10 ML: at 06:08

## 2018-06-04 RX ADMIN — MENTHOL, METHYL SALICYLATE: 10; 15 CREAM TOPICAL at 08:51

## 2018-06-04 RX ADMIN — MIDODRINE HYDROCHLORIDE 10 MG: 5 TABLET ORAL at 08:51

## 2018-06-04 RX ADMIN — RENAGEL 800 MG: 400 TABLET ORAL at 08:51

## 2018-06-04 RX ADMIN — MENTHOL, METHYL SALICYLATE: 10; 15 CREAM TOPICAL at 22:40

## 2018-06-04 RX ADMIN — HEPARIN SODIUM 5000 UNITS: 5000 INJECTION, SOLUTION INTRAVENOUS; SUBCUTANEOUS at 22:41

## 2018-06-04 RX ADMIN — MIDODRINE HYDROCHLORIDE 10 MG: 5 TABLET ORAL at 16:26

## 2018-06-04 RX ADMIN — HEPARIN SODIUM 5000 UNITS: 5000 INJECTION, SOLUTION INTRAVENOUS; SUBCUTANEOUS at 16:25

## 2018-06-04 RX ADMIN — ASPIRIN 81 MG: 81 TABLET, COATED ORAL at 08:51

## 2018-06-04 RX ADMIN — HEPARIN SODIUM 5000 UNITS: 1000 INJECTION, SOLUTION INTRAVENOUS; SUBCUTANEOUS at 11:05

## 2018-06-04 RX ADMIN — HEPARIN SODIUM 5000 UNITS: 5000 INJECTION, SOLUTION INTRAVENOUS; SUBCUTANEOUS at 06:00

## 2018-06-04 NOTE — PROGRESS NOTES
Problem: Self Care Deficits Care Plan (Adult)  Goal: *Acute Goals and Plan of Care (Insert Text)  1. Patient will complete lower body bathing and dressing with minimal assistance and adaptive equipment as needed. 2. Patient will complete toileting with minimal assistance. 3. Patient will tolerate 30 minutes of OT treatment with 1-2 rest breaks to increase activity tolerance for ADLs. 4. Patient will complete functional transfers with CGA and adaptive equipment as needed. 5. Patient will complete functional mobility for household distances with supervision to improve independence with ADL. Timeframe: 7 visits       OCCUPATIONAL THERAPY: Initial Assessment, Treatment Day: 1st and AM 6/4/2018  INPATIENT: Hospital Day: 4  [place] / [place] / Product Type: Medicare /      NAME/AGE/GENDER: Jaylen Chávez is a 70 y.o. female   PRIMARY DIAGNOSIS:  ESRD on dialysis ([address]  ESRD on dialysis ([address]  Hyperkalemia ESRD on dialysis ([address] ESRD on dialysis ([address]        ICD-10: Treatment Diagnosis:    · Generalized Muscle Weakness (M62.81)  · Other lack of cordination (R27.8)   Precautions/Allergies:     Vancomycin      ASSESSMENT:     Ms. Caridad Valentin was admitted with ESRD on dialysis and presents in the ICU. Pt lives with her roommate in a home with a wheelchair ramp to enter and a tub. Pt reports she is independent with ADL at baseline. Pt uses a rollator as needed. This session, pt presented supine in bed. Pt c/o pain in R heel of 8/10 but no redness noted. Pt transferred to the edge of the bed with moderate assistance and additional time. Pt moves slowly and likes to move in her own way. Pt is short and she had difficulty scooting to the edge of the bed. Pt was anxious about the blister on her R thigh. Pt was able to stand with minimal assistance to the walker and needs cues to lean in flexed posture into her arms on the walker. Pt able to take two small side steps.  Pt fatigued with activity and returned back to supine. BP was 148/65 after activity. Pt agreeable to participating in self-grooming ADL in bed. Pt completed combing her hair with additional time and brushing her teeth. Pt has arthritis in her hands and needed assistance to open toothpaste. At the end of the session, pt left supine in bed with needs in reach. Pt is currently limited due decreased strength, balance, and activity tolerance limiting her independence with ADL/functional transfers. Pt will benefit from OT services to address stated goals and plan of care. This section established at most recent assessment   PROBLEM LIST (Impairments causing functional limitations):  1. Decreased Strength  2. Decreased ADL/Functional Activities  3. Decreased Transfer Abilities  4. Decreased Ambulation Ability/Technique  5. Decreased Balance  6. Increased Pain  7. Decreased Activity Tolerance  8. Decreased Flexibility/Joint Mobility  9. Decreased Skin Integrity/Hygeine  10. Decreased [place] with Home Exercise Program  11. Decreased Cognition   INTERVENTIONS PLANNED: (Benefits and precautions of occupational therapy have been discussed with the patient.)  1. Activities of daily living training  2. Adaptive equipment training  3. Balance training  4. Clothing management  5. Cognitive training  6. Donning&doffing training  7. Neuromuscular re-eduation  8. Therapeutic activity  9. Therapeutic exercise     TREATMENT PLAN: Frequency/Duration: Follow patient 3 times per week to address above goals. Rehabilitation Potential For Stated Goals: Good     RECOMMENDED REHABILITATION/EQUIPMENT: (at time of discharge pending progress): Due to the probability of continued deficits (see above) this patient will likely need continued skilled occupational therapy after discharge.   Equipment:    TBD              OCCUPATIONAL PROFILE AND HISTORY:   History of Present Injury/Illness (Reason for Referral):  See H&P  Past Medical History/Comorbidities:   Ms. Fabby Hay  has a past medical history of Chronic kidney disease; Dialysis patient [facility]); GERD (gastroesophageal reflux disease); Hyperlipidemia; Hypertension (6/16/2010); Hypertension; Kidney failure; Liver disease; Obesity; Other ill-defined conditions(799.89); Peripheral neuropathy; and Psychotic disorder. She also has no past medical history of Adverse effect of anesthesia; Arthritis; Autoimmune disease ([facility] DEMENTIA; Difficult intubation; Endocrine disease; Gastrointestinal disorder; Heart failure ([facility] Infectious disease; Malignant hyperthermia due to anesthesia; Nausea & vomiting; Neurological disorder; Pseudocholinesterase deficiency; Psychiatric disorder; PUD (peptic ulcer disease); Seizures ([facility] Sleep disorder; Stroke [facility]); or Thromboembolus ([facility] Ms. Fabby Hay  has a past surgical history that includes hx urological and hx other surgical.  Social History/Living Environment:   Home Environment: Private residence  # Steps to Enter: 5  Wheelchair Ramp: Yes  One/Two Story Residence: [facility]  [employer]: Both  Lift Chair Available: No  Living Alone: No  Support Systems: Friends \ neighbors  Patient Expects to be Discharged to[de-identified] Private residence  Current DME Used/Available at Home: Vergie Late, rollator  Tub or Shower Type: Tub  Prior Level of Function/Work/Activity:  Pt lives with roommate. Pt reports she was independent with ADL. Pt gets down into tub to bathe. Pt reports using a rollator when out into the community. Pt recently slid out of the chair.    Personal Factors:          Past/Current Experience:  Missed multiple dialysis treatments recently and weaker than baseline        Other factors that influence how disability is experienced by the patient:  Multiple co-morbidities    Number of Personal Factors/Comorbidities that affect the Plan of Care: Expanded review of therapy/medical records (1-2):  MODERATE COMPLEXITY   ASSESSMENT OF OCCUPATIONAL PERFORMANCE[de-identified] Activities of Daily Living:   Basic ADLs (From Assessment) Complex ADLs (From Assessment)   Feeding: Supervision  Oral Facial Hygiene/Grooming: Minimum assistance  Bathing: Moderate assistance  Upper Body Dressing: Minimum assistance  Lower Body Dressing: Maximum assistance  Toileting: Maximum assistance Instrumental ADL  Meal Preparation: Maximum assistance  Homemaking: Total assistance   Grooming/Bathing/Dressing Activities of Daily Living     Cognitive Retraining  Safety/Judgement: Awareness of environment; Fall prevention;Home safety                       Bed/Mat Mobility  Rolling: Minimum assistance  Supine to Sit: Moderate assistance  Sit to Supine: Moderate assistance  Sit to Stand: Minimum assistance  Scooting: Moderate assistance       Most Recent Physical Functioning:   Gross Assessment:  AROM: Generally decreased, functional  Strength: Generally decreased, functional  Coordination: Generally decreased, functional  Sensation: Impaired (B hands with numbness)               Posture:  Posture (WDL): Exceptions to WDL  Posture Assessment: Forward head, Rounded shoulders, Trunk flexion  Balance:  Sitting: Impaired  Sitting - Static: Good (unsupported)  Sitting - Dynamic: Fair (occasional)  Standing: Impaired  Standing - Static: Constant support; Fair  Standing - Dynamic : Fair Bed Mobility:  Rolling: Minimum assistance  Supine to Sit: Moderate assistance  Sit to Supine: Moderate assistance  Scooting:  Moderate assistance  Wheelchair Mobility:     Transfers:  Sit to Stand: Minimum assistance  Stand to Sit: Minimum assistance            Patient Vitals for the past 6 hrs:   BP SpO2 Pulse   06/04/18 0458 (!) 74/42 95 % 67   06/04/18 0559 111/75 95 % 68   06/04/18 0758 105/40 94 % 72   06/04/18 0800 - 94 % -   06/04/18 0855 145/66 95 % 77   06/04/18 0905 140/42 98 % 75   06/04/18 0943 148/65 - 88       Mental Status  Neurologic State: Alert  Orientation Level: Oriented X4  Cognition: Appropriate decision making, Follows commands, Memory loss  Perception: Appears intact  Perseveration: No perseveration noted  Safety/Judgement: Awareness of environment, Fall prevention, Home safety                          Physical Skills Involved:  1. Range of Motion  2. Balance  3. Strength  4. Activity Tolerance  5. Sensation  6. Fine Motor Control  7. Pain (acute)  8. Skin Integrity Cognitive Skills Affected (resulting in the inability to perform in a timely and safe manner):  1. Short Term Recall Psychosocial Skills Affected:  1. Habits/Routines  2. Environmental Adaptation   Number of elements that affect the Plan of Care: 5+:  HIGH COMPLEXITY   CLINICAL DECISION MAKIN[address] [place] [address] AM-PAC 6 Clicks   Daily Activity Inpatient Short Form  How much help from another person does the patient currently need. .. Total A Lot A Little None   1. Putting on and taking off regular lower body clothing? [] 1   [x] 2   [] 3   [] 4   2. Bathing (including washing, rinsing, drying)? [] 1   [x] 2   [] 3   [] 4   3. Toileting, which includes using toilet, bedpan or urinal?   [] 1   [x] 2   [] 3   [] 4   4. Putting on and taking off regular upper body clothing? [] 1   [] 2   [x] 3   [] 4   5. Taking care of personal grooming such as brushing teeth? [] 1   [] 2   [x] 3   [] 4   6. Eating meals? [] 1   [] 2   [x] 3   [] 4   © , Trustees of [address] [place] [address], under license to [email or web address]. All rights reserved      Score:  Initial: 15 Most Recent: X (Date: -- )    Interpretation of Tool:  Represents activities that are increasingly more difficult (i.e. Bed mobility, Transfers, Gait). Score 24 23 22-20 19-15 14-10 9-7 6     Modifier CH CI CJ CK CL CM CN      ?  Self Care:     - CURRENT STATUS: CK - 40%-59% impaired, limited or restricted    - GOAL STATUS: CJ - 20%-39% impaired, limited or restricted    - D/C STATUS:  ---------------To be determined---------------  [place] / [place] / Product Type: Medicare /      Medical Necessity:     · Patient demonstrates good rehab potential due to higher previous functional level. Reason for Services/Other Comments:  · Patient continues to require skilled intervention due to decreased independence with ADL/functional mobility. Use of outcome tool(s) and clinical judgement create a POC that gives a: LOW COMPLEXITY         TREATMENT:   (In addition to Assessment/Re-Assessment sessions the following treatments were rendered)     Pre-treatment Symptoms/Complaints:    Pain: Initial:   Pain Intensity 1: 8  Pain Location 1: Foot  Pain Orientation 1: Left  Pain Intervention(s) 1: Repositioned  Post Session:  same     Self Care: (15): Procedure(s) (per grid) utilized to improve and/or restore self-care/home management as related to grooming. Required minimal visual, verbal and tactile cueing to facilitate activities of daily living skills and compensatory activities. Therapeutic Activity: (   8):  Therapeutic activities including Bed transfers, Chair transfers and Ambulation on level ground to improve mobility, strength, balance and coordination. Required minimal to moderate assistance   to promote dynamic balance in standing.     n/a    Braces/Orthotics/Lines/Etc:   · ICU monitor  · O2 Device: Room air  Treatment/Session Assessment:    · Response to Treatment:  Pt tolerated with additional time but with no major complaints. · Interdisciplinary Collaboration:   o Occupational Therapist  o Registered Nurse  · After treatment position/precautions:   o Supine in bed  o Bed/Chair-wheels locked  o Bed in low position  o Call light within reach  o RN notified   · Compliance with Program/Exercises: Will assess as treatment progresses. · Recommendations/Intent for next treatment session: \"Next visit will focus on advancements to more challenging activities and reduction in assistance provided\".   Total Treatment Duration:  OT Patient Time In/Time Out  Time In: 1384  Time Out: 59 Page Tavo Nolasco, OT

## 2018-06-04 NOTE — PROGRESS NOTES
TRANSFER - OUT REPORT:    Verbal report given to St[employer] kian RN on [employer]  being transferred to [address] [place] [address] for routine progression of care       Report consisted of patients Situation, Background, Assessment and   Recommendations(SBAR). Information from the following report(s) SBAR, Kardex, Procedure Summary, Intake/Output, MAR and Recent Results was reviewed with the receiving nurse. Lines:       Opportunity for questions and clarification was provided.       Patient transported with:   [employer]

## 2018-06-04 NOTE — PROGRESS NOTES
Hospitalist Progress Note     Admit Date:  2018  4:32 PM   Name:  Jaylen Chávez   Age:  70 y.o.  :  1950   MRN:  [id number]   PCP:  Belgica Singh MD  Treatment Team: Attending Provider: Emiliana Solomon MD; Consulting Provider: Elias Roblero MD; Consulting Provider: Fco Stein MD; Care Manager: Dave Simmons RN    Subjective:       Ms. Caridad Valentin is a 71 yo female with PMH of ESRD on HD who missed her 18 dialysis (states was not picked up) and is s/p fall after slipping to the floor and unable to get up. She was evaluated for weakness and found to have hyperkalemia (7.6) and admitted for urgent dialysis in ICU. nephro to review followup labs to determine further inpatient HD needs with plans for HD . She has had borderline BP ranges likely due to calcified vessels. She is now on midodrine and antihypertensives stopped. She denies frequent falls, does not use walker/cane in her home. Has left foot pain with negative uric acid, xray left foot negative.    .Plans are for home once discharged       18  Improved BP today and pending HD run, still with diffuse OA pains, has blisters at right AV graft site, no chest pain or edema, no dyspnea or cough, ate ok and had BM     Objective:     Patient Vitals for the past 24 hrs:   Temp Pulse Resp BP SpO2   18 0943 - 88 27 148/65 -   18 0905 - 75 (!) 39 140/42 98 %   18 0855 - 77 16 145/66 95 %   18 0800 - - - - 94 %   18 0758 98.2 °F (36.8 °C) 72 (!) 36 105/40 94 %   18 0559 - 68 20 111/75 95 %   18 0458 - 67 26 (!) 74/42 95 %   18 0359 - 69 (!) 31 (!) 91/39 94 %   18 0258 - 69 30 96/45 96 %   18 0159 - 67 18 (!) 79/47 95 %   188 - 67 16 96/50 94 %   182 - 66 15 108/40 95 %   18 -  17 (!) 85/40 92 %   18 -  20 95/47 97 %   18 98 °F (36.7 °C) 72 - (!) 84/42 91 %   18 -  20 (!) 84/42 90 % 06/03/18 1759 - 78 (!) 34 103/58 95 %   06/03/18 1714 - 78 - 108/50 -   06/03/18 1658 - 73 (!) 33 108/50 95 %   06/03/18 1600 - 70 18 119/53 94 %   06/03/18 1501 98.4 °F (36.9 °C) 73 20 129/48 92 %   06/03/18 1430 - 72 27 108/44 92 %   06/03/18 1401 - 74 20 110/43 91 %   06/03/18 1338 - 73 18 120/47 94 %   06/03/18 1300 - 74 18 106/64 92 %   06/03/18 1200 - 75 17 (!) 83/62 92 %   06/03/18 1147 - 74 - 97/51 -   06/03/18 1130 - 74 (!) 33 97/51 90 %     Oxygen Therapy  O2 Sat (%): 98 % (06/04/18 0905)  Pulse via Oximetry: 76 beats per minute (06/04/18 0905)  O2 Device: Room air (06/04/18 0800)    Intake/Output Summary (Last 24 hours) at 06/04/18 1100  Last data filed at 06/04/18 0608   Gross per 24 hour   Intake               60 ml   Output                0 ml   Net               60 ml         General:    Well nourished. Alert. No distress   CV:   RRR. No murmur, rub, or gallop. No edema  Lungs:   CTAB. No wheezing, rhonchi, or rales. Abdomen:   Soft, nontender, nondistended. Extremities: Warm and dry. No cyanosis or edema. Skin:     No rashes or jaundice. Neuro:  No gross focal deficits    Data Review:  I have reviewed all labs, meds, telemetry events, and studies from the last 24 hours. Recent Results (from the past 24 hour(s))   CBC WITH AUTOMATED DIFF    Collection Time: 06/04/18  3:48 AM   Result Value Ref Range    WBC 7.1 4.3 - 11.1 K/uL    RBC 3.46 (L) 4.05 - 5.25 M/uL    HGB 9.6 (L) 11.7 - 15.4 g/dL    HCT 30.0 (L) 35.8 - 46.3 %    MCV 86.7 79.6 - 97.8 FL    MCH 27.7 26.1 - 32.9 PG    MCHC 32.0 31.4 - 35.0 g/dL    RDW 14.2 11.9 - 14.6 %    PLATELET 916 445 - 626 K/uL    MPV 10.0 (L) 10.8 - 14.1 FL    DF AUTOMATED      NEUTROPHILS 56 43 - 78 %    LYMPHOCYTES 24 13 - 44 %    MONOCYTES 13 (H) 4.0 - 12.0 %    EOSINOPHILS 7 0.5 - 7.8 %    BASOPHILS 0 0.0 - 2.0 %    IMMATURE GRANULOCYTES 0 0.0 - 5.0 %    ABS. NEUTROPHILS 4.0 1.7 - 8.2 K/UL    ABS. LYMPHOCYTES 1.7 0.5 - 4.6 K/UL    ABS.  MONOCYTES 0.9 0.1 - 1.3 K/UL    ABS. EOSINOPHILS 0.5 0.0 - 0.8 K/UL    ABS. BASOPHILS 0.0 0.0 - 0.2 K/UL    ABS. IMM.  GRANS. 0.0 0.0 - 0.5 K/UL   RENAL FUNCTION PANEL    Collection Time: 06/04/18  3:48 AM   Result Value Ref Range    Sodium 140 136 - 145 mmol/L    Potassium 6.3 (HH) 3.5 - 5.1 mmol/L    Chloride 100 98 - 107 mmol/L    CO2 23 21 - 32 mmol/L    Anion gap 17 (H) 7 - 16 mmol/L    Glucose 63 (L) 65 - 100 mg/dL    BUN 45 (H) 8 - 23 MG/DL    Creatinine 12.20 (H) 0.6 - 1.0 MG/DL    GFR est AA 4 (L) >60 ml/min/1.73m2    GFR est non-AA 3 (L) >60 ml/min/1.73m2    Calcium 8.8 8.3 - 10.4 MG/DL    Phosphorus 6.8 (H) 2.3 - 3.7 MG/DL    Albumin 2.2 (L) 3.2 - 4.6 g/dL        All Micro Results     None          Current Meds:  Current Facility-Administered Medications   Medication Dose Route Frequency    hydrALAZINE (APRESOLINE) 20 mg/mL injection 10 mg  10 mg IntraVENous Q6H PRN    midodrine (PROAMITINE) tablet 10 mg  10 mg Oral TID WITH MEALS    methyl salicylate-menthol (BENGAY) 15-10 % cream   Topical Q12H    ALPRAZolam (XANAX) tablet 0.5 mg  0.5 mg Oral TID PRN    aspirin delayed-release tablet 81 mg  81 mg Oral DAILY    cinacalcet (SENSIPAR) tablet 30 mg  30 mg Oral QHS    clopidogrel (PLAVIX) tablet 75 mg  75 mg Oral DAILY    sevelamer (RENAGEL) tablet 800 mg  800 mg Oral TID    traMADol (ULTRAM) tablet 50 mg  50 mg Oral TID PRN    sodium chloride (NS) flush 5-10 mL  5-10 mL IntraVENous Q8H    sodium chloride (NS) flush 5-10 mL  5-10 mL IntraVENous PRN    acetaminophen (TYLENOL) tablet 650 mg  650 mg Oral Q4H PRN    HYDROcodone-acetaminophen (NORCO)  mg tablet 1 Tab  1 Tab Oral Q4H PRN    naloxone (NARCAN) injection 0.4 mg  0.4 mg IntraVENous PRN    diphenhydrAMINE (BENADRYL) capsule 25 mg  25 mg Oral Q4H PRN    prochlorperazine (COMPAZINE) injection 5 mg  5 mg IntraVENous Q8H PRN    bisacodyl (DULCOLAX) tablet 5 mg  5 mg Oral DAILY PRN    heparin (porcine) injection 5,000 Units  5,000 Units SubCUTAneous Q8H       Diet:  DIET REGULAR    Other Studies (last 24 hours):  Xr Foot Lt Min 3 V    Result Date: 6/3/2018  Three-view left Foot x-ray dated Irene 3, 2018 Reference Exam: None Indication: Foot pain Findings: 3 images are presented. No fracture nor dislocation nor foreign body is seen. Impression:  Normal foot x-rays. If a stress related injury is suspected MRI or nuclear medicine study should be considered.        Assessment and Plan:     Hospital Problems as of 6/4/2018  Date Reviewed: 10/12/2017          Codes Class Noted - Resolved POA    Hyperkalemia ICD-10-CM: E87.5  ICD-9-CM: 276.7  6/1/2018 - Present Yes        Class 2 severe obesity due to excess calories with serious comorbidity in adult [facility]) (Chronic) ICD-10-CM: E66.01  ICD-9-CM: 278.01  5/26/2017 - Present Yes        * (Principal)ESRD on dialysis ([facility] (Chronic) ICD-10-CM: N18.6, Z99.2  ICD-9-CM: 585.6, V45.11  6/16/2010 - Present Yes        COPD (chronic obstructive pulmonary disease) (HCC) (Chronic) ICD-10-CM: J44.9  ICD-9-CM: 496  6/16/2010 - Present Yes        Hypertension (Chronic) ICD-10-CM: I10  ICD-9-CM: 401.9  6/16/2010 - Present Yes              PLAN:    · ESRD on HD, with acute hyperkalemia: after missing session, potassium 6.3, appreciate nephrology for HD today, spoke with Dr. Bjorn Islas of vascular who will check right AV graft wound   · Left ankle pain: xray negative, symptomatic management and PT  · Debility: fall risk, PT/OT/ case management consults   · Hypotension: held losartan / clonidine, added midodrine, improved  · Hypoglycemia: check POC  · Ok for floor transfer remote tele pending bed     DC planning/Dispo:    Diet:  DIET REGULAR  DVT ppx:  Heparin       Signed:  Ifeanyi Hurtado MD

## 2018-06-04 NOTE — PROGRESS NOTES
Pt seen in ICU s/p admission ESRD. Alert and oriented, sitting up in bed. States she lives with roommate, Johanne Severino. She has w/c ramp on back of home to get into home. She uses Rolator inside home. Independent of ADL's. Oneyda Brandon does cooking per pt. She goes to Dialysis in [facility], but lives here in [place]. Past history of [place] will not accept due to behavior. She rides [employer] for transportation. States \"brother\" Laton Main[place] and \"brother\" Christina Martínez is her decision makers, POA/HCPOA, from [employer]. States she has family, but would not want them making decisions. Pt states she is Jehovah Witness. Ask that she have them place on file here at hospital. Verbalized understanding. Pt has PCP and [facility] insurance. Stated she may need HH at d/c. States she does not want this. She states she is in process of getting scooter. CM to follow and assist with any d/c needs. Care Management Interventions  PCP Verified by CM: Yes  Mode of Transport at Discharge: Other (see comment)  Physical Therapy Consult: Yes  Occupational Therapy Consult: Yes  Current Support Network:  Other  Confirm Follow Up Transport: [facility] discussed with Pt/Family/Caregiver: Yes  Freedom of Choice Offered: Yes  Discharge Location  Discharge Placement: Unable to determine at this time

## 2018-06-04 NOTE — DIALYSIS
Hemodialysis ended without problems by [facility], RN. B/P- 150/108, HR- 67  2.0  kg's removed. Patient tolerated treatment well and is awaiting transport to her room.

## 2018-06-04 NOTE — DIALYSIS
HD treatment initiated via right perm cath without difficulty. CVC dressing clean, dry, and intact, tego caps intact, bilateral lumen aspirated and flushed with 9cc NS. VS stable, will continue to monitor during tx. Heparin 2000 units bolus and 1000 units/hr this tx. Machine tests passed and alarms intact. NAD.

## 2018-06-04 NOTE — PROGRESS NOTES
Bedside and Verbal shift change report given to Christie by Shyla Fang. Report included the following information SBAR, Kardex, ED Summary, OR Summary, Intake/Output, MAR, Recent Results, Med Rec Status and Cardiac Rhythm NSR.

## 2018-06-04 NOTE — PROGRESS NOTES
Initial visit was made, emotional support and a spiritual presence was provided to the patient.         [employer]

## 2018-06-04 NOTE — PROGRESS NOTES
Patient refusing blood sugar checks. Explained the reasoning behind checking her blood sugar. Patient upset because she is not a diabetic and still refusing blood sugar checks despite education.

## 2018-06-04 NOTE — PROGRESS NOTES
Renal Progress Note    Admission Date: 6/1/2018   Subjective: The patient was seen on dialysis at 1:56 PM .  BP is stable. Catheter is functioning well.       Objective:     Physical Exam:    Patient Vitals for the past 8 hrs:   BP Temp Pulse Resp SpO2   06/04/18 1326 113/85 - 78 - -   06/04/18 1255 102/45 - 71 - -   06/04/18 1224 131/59 - 74 - -   06/04/18 1200 123/83 - 73 - -   06/04/18 1132 (!) 131/103 - (!) 108 - -   06/04/18 1102 149/61 - 76 - -   06/04/18 0943 148/65 - 88 27 -   06/04/18 0905 140/42 - 75 (!) 39 98 %   06/04/18 0855 145/66 - 77 16 95 %   06/04/18 0800 - - - - 94 %   06/04/18 0758 105/40 98.2 °F (36.8 °C) 72 (!) 36 94 %   06/04/18 0559 111/75 - 68 20 95 %      Gen: comfortable , NAD  HEENT: moist membranes  CV: S1, S2  Lungs: Clear bilaterally  Extem: no edema     Current Facility-Administered Medications   Medication Dose Route Frequency    heparin (porcine) 1,000 unit/mL injection 5,000 Units  5,000 Units Hemodialysis DIALYSIS PRN    hydrALAZINE (APRESOLINE) 20 mg/mL injection 10 mg  10 mg IntraVENous Q6H PRN    midodrine (PROAMITINE) tablet 10 mg  10 mg Oral TID WITH MEALS    methyl salicylate-menthol (BENGAY) 15-10 % cream   Topical Q12H    ALPRAZolam (XANAX) tablet 0.5 mg  0.5 mg Oral TID PRN    aspirin delayed-release tablet 81 mg  81 mg Oral DAILY    cinacalcet (SENSIPAR) tablet 30 mg  30 mg Oral QHS    clopidogrel (PLAVIX) tablet 75 mg  75 mg Oral DAILY    sevelamer (RENAGEL) tablet 800 mg  800 mg Oral TID    traMADol (ULTRAM) tablet 50 mg  50 mg Oral TID PRN    sodium chloride (NS) flush 5-10 mL  5-10 mL IntraVENous Q8H    sodium chloride (NS) flush 5-10 mL  5-10 mL IntraVENous PRN    acetaminophen (TYLENOL) tablet 650 mg  650 mg Oral Q4H PRN    HYDROcodone-acetaminophen (NORCO)  mg tablet 1 Tab  1 Tab Oral Q4H PRN    naloxone (NARCAN) injection 0.4 mg  0.4 mg IntraVENous PRN    diphenhydrAMINE (BENADRYL) capsule 25 mg  25 mg Oral Q4H PRN    prochlorperazine (COMPAZINE) injection 5 mg  5 mg IntraVENous Q8H PRN    bisacodyl (DULCOLAX) tablet 5 mg  5 mg Oral DAILY PRN    heparin (porcine) injection 5,000 Units  5,000 Units SubCUTAneous Q8H            Data Review:     LABS:   Recent Results (from the past 12 hour(s))   CBC WITH AUTOMATED DIFF    Collection Time: 06/04/18  3:48 AM   Result Value Ref Range    WBC 7.1 4.3 - 11.1 K/uL    RBC 3.46 (L) 4.05 - 5.25 M/uL    HGB 9.6 (L) 11.7 - 15.4 g/dL    HCT 30.0 (L) 35.8 - 46.3 %    MCV 86.7 79.6 - 97.8 FL    MCH 27.7 26.1 - 32.9 PG    MCHC 32.0 31.4 - 35.0 g/dL    RDW 14.2 11.9 - 14.6 %    PLATELET 999 091 - 699 K/uL    MPV 10.0 (L) 10.8 - 14.1 FL    DF AUTOMATED      NEUTROPHILS 56 43 - 78 %    LYMPHOCYTES 24 13 - 44 %    MONOCYTES 13 (H) 4.0 - 12.0 %    EOSINOPHILS 7 0.5 - 7.8 %    BASOPHILS 0 0.0 - 2.0 %    IMMATURE GRANULOCYTES 0 0.0 - 5.0 %    ABS. NEUTROPHILS 4.0 1.7 - 8.2 K/UL    ABS. LYMPHOCYTES 1.7 0.5 - 4.6 K/UL    ABS. MONOCYTES 0.9 0.1 - 1.3 K/UL    ABS. EOSINOPHILS 0.5 0.0 - 0.8 K/UL    ABS. BASOPHILS 0.0 0.0 - 0.2 K/UL    ABS. IMM.  GRANS. 0.0 0.0 - 0.5 K/UL   RENAL FUNCTION PANEL    Collection Time: 06/04/18  3:48 AM   Result Value Ref Range    Sodium 140 136 - 145 mmol/L    Potassium 6.3 (HH) 3.5 - 5.1 mmol/L    Chloride 100 98 - 107 mmol/L    CO2 23 21 - 32 mmol/L    Anion gap 17 (H) 7 - 16 mmol/L    Glucose 63 (L) 65 - 100 mg/dL    BUN 45 (H) 8 - 23 MG/DL    Creatinine 12.20 (H) 0.6 - 1.0 MG/DL    GFR est AA 4 (L) >60 ml/min/1.73m2    GFR est non-AA 3 (L) >60 ml/min/1.73m2    Calcium 8.8 8.3 - 10.4 MG/DL    Phosphorus 6.8 (H) 2.3 - 3.7 MG/DL    Albumin 2.2 (L) 3.2 - 4.6 g/dL         Plan:     Principal Problem:    ESRD on dialysis ([facility] (6/16/2010)    Active Problems:    COPD (chronic obstructive pulmonary disease) ([facility] (6/16/2010)      Hypertension (6/16/2010)      Class 2 severe obesity due to excess calories with serious comorbidity in adult [facility]) (5/26/2017)      Hyperkalemia (6/1/2018)       ESRD - dialysis again today for higher potassim  Tolerating well.    Anemia - hgb stable

## 2018-06-05 VITALS
WEIGHT: 188.4 LBS | OXYGEN SATURATION: 95 % | BODY MASS INDEX: 37.98 KG/M2 | RESPIRATION RATE: 19 BRPM | HEART RATE: 89 BPM | DIASTOLIC BLOOD PRESSURE: 82 MMHG | SYSTOLIC BLOOD PRESSURE: 145 MMHG | TEMPERATURE: 98.2 F | HEIGHT: 59 IN

## 2018-06-05 LAB
GLUCOSE BLD STRIP.AUTO-MCNC: 77 MG/DL (ref 65–100)
GLUCOSE BLD STRIP.AUTO-MCNC: 91 MG/DL (ref 65–100)
POTASSIUM SERPL-SCNC: 4.9 MMOL/L (ref 3.5–5.1)

## 2018-06-05 PROCEDURE — 0H9HXZZ DRAINAGE OF RIGHT UPPER LEG SKIN, EXTERNAL APPROACH: ICD-10-PCS | Performed by: PHYSICIAN ASSISTANT

## 2018-06-05 PROCEDURE — 82962 GLUCOSE BLOOD TEST: CPT

## 2018-06-05 PROCEDURE — 36415 COLL VENOUS BLD VENIPUNCTURE: CPT | Performed by: SURGERY

## 2018-06-05 PROCEDURE — 84132 ASSAY OF SERUM POTASSIUM: CPT | Performed by: SURGERY

## 2018-06-05 RX ORDER — MIDODRINE HYDROCHLORIDE 5 MG/1
5 TABLET ORAL
Qty: 90 TAB | Refills: 0 | Status: SHIPPED | OUTPATIENT
Start: 2018-06-05 | End: 2018-06-05

## 2018-06-05 RX ORDER — MIDODRINE HYDROCHLORIDE 5 MG/1
5 TABLET ORAL
Qty: 90 TAB | Refills: 0 | Status: SHIPPED | OUTPATIENT
Start: 2018-06-05 | End: 2018-07-05

## 2018-06-05 RX ADMIN — Medication 10 ML: at 05:44

## 2018-06-05 NOTE — DISCHARGE SUMMARY
Physician Discharge Summary       Patient: Jazzy Dimas MRN: [id number]  SSN: [id number]    YOB: 1950  Age: 70 y.o. Sex: female    PCP: Rozina Grajeda MD    Allergies: Vancomycin    Admit date: 6/1/2018  Admitting Provider: Danyelle Malone MD    Discharge date: 6/5/2018  Discharging Provider: Tyler Davies MD    * Admission Diagnoses: ESRD on dialysis [facility])  ESRD on dialysis [facility])  Hyperkalemia    * Discharge Diagnoses:    Hospital Problems as of 6/5/2018  Date Reviewed: 10/12/2017          Codes Class Noted - Resolved POA    Hyperkalemia ICD-10-CM: E87.5  ICD-9-CM: 276.7  6/1/2018 - Present Yes        Class 2 severe obesity due to excess calories with serious comorbidity in adult [facility]) (Chronic) ICD-10-CM: E66.01  ICD-9-CM: 278.01  5/26/2017 - Present Yes        * (Principal)ESRD on dialysis [facility]) (Chronic) ICD-10-CM: N18.6, Z99.2  ICD-9-CM: 585.6, V45.11  6/16/2010 - Present Yes        COPD (chronic obstructive pulmonary disease) ([facility] (Chronic) ICD-10-CM: J44.9  ICD-9-CM: 967  6/16/2010 - Present Yes        Hypertension (Chronic) ICD-10-CM: I10  ICD-9-CM: 401.9  6/16/2010 - Present Yes              * Hospital Course:     Ms. Marlin Grayson is a 69 yo AAF, with ESRD, who presented 6/1 for profound weakness/fall after missing some dialysis sessions and was found to be hyperkalemic with a potassium of 7.6. She was admitted to the ICU for emergent dialysis. Her potassium is now 4.9 and she is back on her [facility] routine dialysis schedule. She has been hypotensive during admission and blood pressure agents held and midodrine started. Vascular surgery has followed patient here for right thigh graft access that was recently placed. A blister formed around it and vascular feels it is not infected. Dry bandage in place that should be changed at home tomorrow. She is now medically stable for discharge home.     Consults: Nephrology and Vascular Surgery    Discharge Exam:  General: awake, alert, oriented  Eyes; non icteric, EOMI  Neck; supple  CV: RRR  Pulm; CTAB  Abd; soft, non tender  Ext: right thigh bandage clean/dry    * Discharge Condition: stable  * Disposition: Home    Discharge Medications:  Current Discharge Medication List      START taking these medications    Details   midodrine (PROAMITINE) 5 mg tablet Take 1 Tab by mouth three (3) times daily (with meals) for 30 days. Qty: 90 Tab, Refills: 0         CONTINUE these medications which have NOT CHANGED    Details   aspirin delayed-release 81 mg tablet Take 81 mg by mouth daily. clopidogrel (PLAVIX) 75 mg tab Take 75 mg by mouth daily. VIT B COMP C/FOLIC ACID/VIT D3 (DIALYVITE 800 PLUS D PO) Take  by mouth. Associated Diagnoses: Acute on chronic renal failure (HCC)      omeprazole (PRILOSEC) 20 mg capsule Take 20 mg by mouth as needed. Refills: 6      RENVELA 800 mg tab tab Take 800 mg by mouth three (3) times daily (with meals). Refills: 6      albuterol (PROAIR HFA) 90 mcg/actuation inhaler Take 1 Puff by inhalation every six (6) hours as needed for Wheezing or Shortness of Breath. Qty: 1 Inhaler, Refills: 1      ALPRAZolam (XANAX) 0.5 mg tablet Take 0.5 mg by mouth three (3) times daily as needed for Anxiety. traMADol (ULTRAM) 50 mg tablet Take 50 mg by mouth four (4) times daily as needed for Pain (for leg pain). gabapentin (NEURONTIN) 100 mg capsule Take 100 mg by mouth two (2) times a day. Indications: \"take it when my nerves are bad\". cinacalcet (SENSIPAR) 30 mg tablet Take 30 mg by mouth nightly. STOP taking these medications       losartan (COZAAR) 50 mg tablet Comments:   Reason for Stopping:         cloNIDine HCl (CATAPRES) 0.2 mg tablet Comments:   Reason for Stopping:               * Follow-up Care/Patient Instructions:   Activity: as tolerated  Diet: renal  Wound Care: to change RLE bandage tomorrow    Follow-up Information     Follow up With Details Comments Alvaro Dawson MD Go on 6/26/2018 10:15am for recheck right thigh AVG 11 [address]      Tj Weaver MD On 6/12/2018 1:15pm [address] [place] [address]  [phone]          35 minutes spent in discharge planning and coordination of care.     Signed:  Westley Ferrari MD  6/5/2018  12:49 PM

## 2018-06-05 NOTE — CONSULTS
Patient seen and examined. Patient status post right placement of right thigh graft. Positive thrill and bruit. Patient developed a little small blister on the medial aspect of the graft. No signs of infection. Patient very concerned about the blistering. Blister can be opened and drained for force with paper tape placed on site. No signs of infection no need for IV antibiotics. Can follow-up in 2 weeks with regular scheduled appointment.   Rosendo Ansari

## 2018-06-05 NOTE — PROGRESS NOTES
Was called to floor by patient's primary RN due to patient's persistent complaint of skin blistering at superior incision site of right thigh AVG placed 5/29/2018. Dr. Home Viera drained this yesterday, but it has returned. Patient admits to soreness of thigh in general but denies fever, chills, nausea. Patient also requesting to be discharged. K+ 4.9 today. Afebrile, no leukocytosis  A&O x3  Normal respiratory effort  RRR  Right thigh s/p AVG with mild residual edema, minimal erythema, +thrill, audible bruit; blister at superior graft incision with oblong blister ~2x4cm unroofed with scalpel, <4ml maroon serous drainage released; soft, pliable track of raised skin (? fibrous reactive tissue vs. old clot) without drainage    A/P:   - Incision site dressed with Xeroform then 4x4s then ABD, secured with paper tape. If discharged later today, patient may remove this tomorrow, wash area and replace Xeroform and 4x4s. Otherwise, wound care by staff here tomorrow. - Follow up in office with Home Viera MD at regularly scheduled appt - 6/26/2018 at 10:15 AM.  - Discharge per primary, okay to go home from Vascular Surgery standpoint. Oliverio Farah PA-C  Physician Assistant with Vascular Surgery Anson Sellers MD / Priscila Kelly.  Stacey Ferrer MD / Yaquelin Javier MD

## 2018-06-05 NOTE — PROGRESS NOTES
Discharge instructions, follow up information, medication list, prescription, and medication side effects provided and explained to the pt. No IV to removed,  Patient has right chest dialysis line ( not to be removed),  No remote telemetry on. Opportunity for questions provided. PCT notified patient is ready to leave.

## 2018-06-05 NOTE — PROGRESS NOTES
Met with patient regarding discharge planning. She lives at home with a roommate. Patient walks with a rollator. States she has a ramp at the back of her house to get in and out. She has ESRD and is on outpatient HD on a M, W, F schedule at [facility]. Patient states that her goal is to \"get better and get back home so that I can witness. \" Patient is fixated on wanting to discharge today, stating she does not trust \"those three nurses. \" She refuses home health follow up at discharge. Denies any other discharge planning needs. Case Management will continue to follow. Care Management Interventions  PCP Verified by CM: Yes  Mode of Transport at Discharge:  Other (see comment)  Transition of Care Consult (CM Consult): Discharge Planning  Discharge Durable Medical Equipment: No  Physical Therapy Consult: Yes  Occupational Therapy Consult: Yes  Speech Therapy Consult: No  Current Support Network: Own Home  Confirm Follow Up Transport: Friends  Plan discussed with Pt/Family/Caregiver: Yes  Freedom of Choice Offered: Yes  Discharge Location  Discharge Placement: Home

## 2018-06-05 NOTE — PROGRESS NOTES
Patient rested throughout the night. RR present and chest expansion symmetrical.  Gave report to on-coming RN, Raheem Quispe.

## 2018-06-05 NOTE — PROGRESS NOTES
Date of Outreach Update:  Gema Carrion was seen and assessed. MEWS Score: 2 (06/04/18 2043)  Vitals:    06/04/18 1907 06/04/18 2032 06/04/18 2043 06/05/18 0050   BP: 103/48  122/57 118/61   Pulse: 83  81 82   Resp: 22  22 20   Temp: 99.5 °F (37.5 °C)  99.1 °F (37.3 °C) 99 °F (37.2 °C)   SpO2:  96% 98% 97%   Weight:       Height:             Pain Assessment  Pain Intensity 1: 0 (06/04/18 2043)  Pain Location 1: Foot  Pain Intervention(s) 1: Repositioned  Patient Stated Pain Goal: 0      Previous Outreach assessment has been reviewed. There have been no significant clinical changes since the completion of the last dated Outreach assessment. Pt awake, watching tv, and applying lotion to her hands. NAD - denies needs at this time. Will continue to follow up per outreach protocol.     Signed By:   Dariusz Russ    June 5, 2018 1:15 AM

## 2018-06-05 NOTE — PROGRESS NOTES
Received report from Lauren Luis RN. Received patient from ICU. Patient AxO x4. RR present and chest expansion symmetrical.  Radial and pedal pulses palpable bilaterally. Cap refill < 3 secs. Ecchymosis bilaterally in upper and lower extremities. Bowel sounds active in all 4 quadrants. Bed low, locked, and side rails x3. Call light within reach. Will continue to monitor.

## 2018-06-05 NOTE — PROGRESS NOTES
Renal Progress Note    Admission Date: 6/1/2018   Subjective: The patient was seen and examined. Upset about blister over right thigh AVG. Denies c/o. No sob, cp, n/v. Tolerated HD well yesterday with 2 kg UF.     Objective:     Physical Exam:    Patient Vitals for the past 8 hrs:   BP Temp Pulse Resp SpO2 Weight   06/05/18 0750 168/81 97.9 °F (36.6 °C) (!) 111 19 91 % -   06/05/18 0354 116/64 98.4 °F (36.9 °C) 89 20 95 % 85.5 kg (188 lb 6.4 oz)      Gen: comfortable , NAD  HEENT: moist membranes  CV: S1, S2  Lungs: Clear bilaterally  Extem: no edema     Current Facility-Administered Medications   Medication Dose Route Frequency    heparin (porcine) 1,000 unit/mL injection 5,000 Units  5,000 Units Hemodialysis DIALYSIS PRN    hydrALAZINE (APRESOLINE) 20 mg/mL injection 10 mg  10 mg IntraVENous Q6H PRN    midodrine (PROAMITINE) tablet 10 mg  10 mg Oral TID WITH MEALS    methyl salicylate-menthol (BENGAY) 15-10 % cream   Topical Q12H    ALPRAZolam (XANAX) tablet 0.5 mg  0.5 mg Oral TID PRN    aspirin delayed-release tablet 81 mg  81 mg Oral DAILY    cinacalcet (SENSIPAR) tablet 30 mg  30 mg Oral QHS    clopidogrel (PLAVIX) tablet 75 mg  75 mg Oral DAILY    sevelamer (RENAGEL) tablet 800 mg  800 mg Oral TID    traMADol (ULTRAM) tablet 50 mg  50 mg Oral TID PRN    sodium chloride (NS) flush 5-10 mL  5-10 mL IntraVENous Q8H    sodium chloride (NS) flush 5-10 mL  5-10 mL IntraVENous PRN    acetaminophen (TYLENOL) tablet 650 mg  650 mg Oral Q4H PRN    HYDROcodone-acetaminophen (NORCO)  mg tablet 1 Tab  1 Tab Oral Q4H PRN    naloxone (NARCAN) injection 0.4 mg  0.4 mg IntraVENous PRN    diphenhydrAMINE (BENADRYL) capsule 25 mg  25 mg Oral Q4H PRN    prochlorperazine (COMPAZINE) injection 5 mg  5 mg IntraVENous Q8H PRN    bisacodyl (DULCOLAX) tablet 5 mg  5 mg Oral DAILY PRN    heparin (porcine) injection 5,000 Units  5,000 Units SubCUTAneous Q8H        Data Review:     LABS:   Recent Results (from the past 12 hour(s))   GLUCOSE, POC    Collection Time: 06/05/18  7:53 AM   Result Value Ref Range    Glucose (POC) 91 65 - 100 mg/dL         Plan:     Principal Problem:    ESRD on dialysis ([facility] (6/16/2010)    Active Problems:    COPD (chronic obstructive pulmonary disease) ([facility] (6/16/2010)      Hypertension (6/16/2010)      Class 2 severe obesity due to excess calories with serious comorbidity in adult [facility]) (5/26/2017)      Hyperkalemia (6/1/2018)       [facility]RD on HD [facility]  - HD in am     Hyperkalemia  - missed HD x 2 last week   - improved s/p HD  - recheck K this am 4.9    S/p new right thigh AVG  - s/p vascular surgery eval - no s/o infection. Blister over medial aspect of graft, now open    HTN - low bp with HD.  On midodrine    Anemia - hgb stable

## 2018-06-05 NOTE — DISCHARGE INSTRUCTIONS
Incision site dressed with Xeroform then 4x4s then ABD, secured with paper tape. If discharged later today, patient may remove this tomorrow, wash area and replace Xeroform and 4x4s. Follow up in office with Mike Chao MD at regularly scheduled appt - 6/26/2018 at 10:15 AM.      DISCHARGE SUMMARY from Nurse    PATIENT INSTRUCTIONS:    After general anesthesia or intravenous sedation, for 24 hours or while taking prescription Narcotics:  · Limit your activities  · Do not drive and operate hazardous machinery  · Do not make important personal or business decisions  · Do  not drink alcoholic beverages  · If you have not urinated within 8 hours after discharge, please contact your surgeon on call. Report the following to your surgeon:  · Excessive pain, swelling, redness or odor of or around the surgical area  · Temperature over 100.5  · Nausea and vomiting lasting longer than 4 hours or if unable to take medications  · Any signs of decreased circulation or nerve impairment to extremity: change in color, persistent  numbness, tingling, coldness or increase pain  · Any questions    What to do at Home:    *  Please give a list of your current medications to your Primary Care Provider. *  Please update this list whenever your medications are discontinued, doses are      changed, or new medications (including over-the-counter products) are added. *  Please carry medication information at all times in case of emergency situations. These are general instructions for a healthy lifestyle:    No smoking/ No tobacco products/ Avoid exposure to second hand smoke  Surgeon General's Warning:  Quitting smoking now greatly reduces serious risk to your health.     Obesity, smoking, and sedentary lifestyle greatly increases your risk for illness    A healthy diet, regular physical exercise & weight monitoring are important for maintaining a healthy lifestyle    You may be retaining fluid if you have a history of heart failure or if you experience any of the following symptoms:  Weight gain of 3 pounds or more overnight or 5 pounds in a week, increased swelling in our hands or feet or shortness of breath while lying flat in bed. Please call your doctor as soon as you notice any of these symptoms; do not wait until your next office visit. Recognize signs and symptoms of STROKE:    F-face looks uneven    A-arms unable to move or move unevenly    S-speech slurred or non-existent    T-time-call 911 as soon as signs and symptoms begin-DO NOT go       Back to bed or wait to see if you get better-TIME IS BRAIN. Warning Signs of HEART ATTACK     Call 911 if you have these symptoms:   Chest discomfort. Most heart attacks involve discomfort in the center of the chest that lasts more than a few minutes, or that goes away and comes back. It can feel like uncomfortable pressure, squeezing, fullness, or pain.  Discomfort in other areas of the upper body. Symptoms can include pain or discomfort in one or both arms, the back, neck, jaw, or stomach.  Shortness of breath with or without chest discomfort.  Other signs may include breaking out in a cold sweat, nausea, or lightheadedness. Don't wait more than five minutes to call 911 - MINUTES MATTER! Fast action can save your life. Calling 911 is almost always the fastest way to get lifesaving treatment. Emergency Medical Services staff can begin treatment when they arrive -- up to an hour sooner than if someone gets to the hospital by car. The discharge information has been reviewed with the patient. The patient verbalized understanding. Discharge medications reviewed with the patient and appropriate educational materials and side effects teaching were provided. Hyperkalemia: Care Instructions  Your Care Instructions    Hyperkalemia is too much potassium in the blood. Potassium helps keep the right mix of fluids in your body.  It also helps your nerves and muscles work as they should. And it keeps your heartbeat in a normal rhythm. Some things can raise potassium levels. These include some health problems, medicines, and kidney problems. (Normally, your kidneys remove extra potassium.)  Too much potassium can cause nausea. It also can cause a heartbeat that isn't normal. But you may not have any symptoms. Too much potassium can be dangerous. That's why it's important to treat it. If you are taking any of the medicines that can raise your levels, your doctor will ask you to stop. You may get medicines to lower your levels. And you may have to limit or not eat foods that have a lot of potassium. Follow-up care is a key part of your treatment and safety. Be sure to make and go to all appointments, and call your doctor if you are having problems. It's also a good idea to know your test results and keep a list of the medicines you take. How can you care for yourself at home? · Take your medicines exactly as prescribed. Call your doctor if you think you are having a problem with your medicine. · Stop taking certain medicines if your doctor asks you to. They may be causing your high potassium levels. If you have concerns about stopping medicine, talk with your doctor. · If you have kidney, heart, or liver disease and have to limit fluids, talk with your doctor before you increase the amount of fluids you drink. If the doctor says it's okay, drink plenty of fluids. This means drinking enough so that your urine is light yellow or clear like water. · Avoid strenuous exercise until your doctor tells you it is okay. · Potassium is in many foods, including vegetables, fruits, and milk products. Foods high in potassium include bananas, cantaloupe, broccoli, milk, potatoes, and tomatoes. · Low potassium foods include blueberries, raspberries, cucumber, white or brown rice, spaghetti, and macaroni.   · Do not use a salt substitute without talking to your doctor first. Most of these are very high in potassium. · Be sure to tell your doctor about any prescription, over-the-counter, or herbal medicines you take. Some of these can raise potassium. When should you call for help? Call 911 anytime you think you may need emergency care. For example, call if:  ? · You passed out (lost consciousness). ? · You have an unusual heartbeat. Your heart may beat fast or skip beats. ?Call your doctor now or seek immediate medical care if:  ? · You have muscle aches. ? · You feel very weak. ? Watch closely for changes in your health, and be sure to contact your doctor if:  ? · You do not get better as expected. Where can you learn more? Go to http://madeline-flash.info/. Enter K457 in the search box to learn more about \"Hyperkalemia: Care Instructions. \"  Current as of: May 12, 2017  Content Version: 11.4  © 0560-3847 [employer]. Care instructions adapted under license by [employer] (which disclaims liability or warranty for this information). If you have questions about a medical condition or this instruction, always ask your healthcare professional. [place] [address] any warranty or liability for your use of this information. End-Stage Renal Disease: Care Instructions  Your Care Instructions    End-stage renal (or kidney) disease happens when your kidneys can no longer do their jobs. They can't remove waste from your blood. And they aren't able to balance your body's fluids and chemicals. This stage of the disease usually occurs after you have chronic kidney disease for years. Now the kidneys work so poorly that you need dialysis or a kidney transplant. Dialysis uses a machine to filter your waste. A transplant is surgery to give you a healthy kidney from another person. Follow-up care is a key part of your treatment and safety. Be sure to make and go to all appointments, and call your doctor if you are having problems.  It's also a good idea to know your test results and keep a list of the medicines you take. How can you care for yourself at home? ? · Be safe with medicines. Take your medicines exactly as prescribed. Call your doctor if you have any problems with your medicine. You also may take medicine to control your blood pressure or to treat diabetes. Many people who have diabetes take blood pressure medicine. ? · If you have diabetes, do your best to keep your blood sugar in your target range. You may do this by taking medicine, eating healthy food, and exercising. ? · Follow your dialysis schedule. ? · Do not take ibuprofen (Advil, Motrin), naproxen (Aleve), or similar medicines, unless your doctor tells you to. These may make chronic kidney disease worse. ? · Make sure your doctor knows all of the medicines, vitamins, supplements, and herbal remedies you take. ? · Do not smoke or use other tobacco products. Smoking can reduce blood flow to the kidneys. If you need help quitting, talk to your doctor about stop-smoking programs and medicines. These can increase your chances of quitting for good. ? · Do not drink alcohol or use illegal drugs. ? · If your doctor recommends it, get more exercise. Walking is a good choice. ? · If you have an advance directive, let your doctor know. It may include a living will and a durable power of  for health care. If you don't have one, you may want to prepare one. It lets your doctor and loved ones know your health care wishes if you become unable to speak for yourself. Diet  ? · Talk to a registered dietitian. He or she can help you make a meal plan that is right for you. Most people with kidney disease need to limit salt (sodium), fluids, and protein. Some also have to limit potassium and phosphorus. When should you call for help? Call 911 anytime you think you may need emergency care. For example, call if:  ? · You passed out (lost consciousness).    ?Call your doctor now or seek immediate medical care if:  ? · You have new or worse nausea and vomiting. ? · You have much less urine than normal, or you have no urine. ? · You are feeling confused or cannot think clearly. ? · You have new or more blood in your urine. ? · You have new swelling. ? · You are dizzy or lightheaded, or feel like you may faint. ? Watch closely for changes in your health, and be sure to contact your doctor if you have any problems. Where can you learn more? Go to http://madeline-falsh.info/. Enter N809 in the search box to learn more about \"End-Stage Renal Disease: Care Instructions. \"  Current as of: May 12, 2017  Content Version: 11.4  © 9705-0330 [employer]. Care instructions adapted under license by [employer] (which disclaims liability or warranty for this information). If you have questions about a medical condition or this instruction, always ask your healthcare professional. [address] any warranty or liability for your use of this information.     ___________________________________________________________________________________________________________________________________

## 2018-06-05 NOTE — PROGRESS NOTES
Patients right access leg, which a blister was drained yesterday from vascular surgery, has thin moving shiny fluid filled skin along whole access. CC was notified to assess r/t patient is refusing any care from us at this time. Dr. aLura Arndt called and notified along with vascular surgery. Patient instructed to not touch access r/t very thin skin. Awaiting any new orders a this time. Will monitor.

## 2018-06-05 NOTE — PROGRESS NOTES
Date of Outreach Update:  Malinda Childs was seen and assessed. MEWS Score: 2 (06/04/18 2043)  Vitals:    06/04/18 2032 06/04/18 2043 06/05/18 0050 06/05/18 0354   BP:  122/57 118/61 116/64   Pulse:  81 82 89   Resp:  22 20 20   Temp:  99.1 °F (37.3 °C) 99 °F (37.2 °C) 98.4 °F (36.9 °C)   SpO2: 96% 98% 97% 95%   Weight:    85.5 kg (188 lb 6.4 oz)   Height:             Pain Assessment  Pain Intensity 1: 0 (06/04/18 2043)  Pain Location 1: Foot  Pain Intervention(s) 1: Repositioned  Patient Stated Pain Goal: 0      Previous Outreach assessment has been reviewed. There have been no significant clinical changes since the completion of the last dated Outreach assessment. Will continue to follow up per outreach protocol.     Signed By:   Mike Stovall    June 5, 2018 5:51 AM

## 2018-06-05 NOTE — PROGRESS NOTES
[facility] CRITICAL CARE OUTREACH NURSE PROGRESS REPORT      SUBJECTIVE: Called to assess patient secondary to outreach program with recent transfer from ICU. MEWS Score: 2 (06/04/18 1907)  Vitals:    06/04/18 1658 06/04/18 1705 06/04/18 1805 06/04/18 1907   BP:  97/54 106/53 103/48   Pulse:  82 79 83   Resp:  25 24 22   Temp:    99.5 °F (37.5 °C)   SpO2:       Weight: 87.4 kg (192 lb 10.9 oz)      Height:            LAB DATA:    Recent Labs      06/04/18   0348  06/03/18   0329  06/02/18   0811   NA  140  140  139   K  6.3*  5.9*  5.9*   CL  100  103  100   CO2  23  26  28   AGAP  17*  11  11   GLU  63*  65  67   BUN  45*  37*  26*   CREA  12.20*  9.94*  7.99*   GFRAA  4*  5*  6*   GFRNA  3*  4*  5*   CA  8.8  8.6  8.7   PHOS  6.8*  6.4*   --    ALB  2.2*  2.3*   --         Recent Labs      06/04/18   0348  06/03/18   0329  06/02/18   0811   WBC  7.1  6.1  5.1   HGB  9.6*  9.4*  9.6*   HCT  30.0*  29.2*  29.4*   PLT  207  160  133*          OBJECTIVE: On arrival to room, I found patient to be watching tv. Pain Assessment  Pain Intensity 1: 0 (06/04/18 1620)  Pain Location 1: Foot  Pain Intervention(s) 1: Repositioned  Patient Stated Pain Goal: 0                                 ASSESSMENT:  Pt alert and oriented. O2 sats 99% on RA. Lungs are CTA. Pt is concerned about wound drainage on R leg. PLAN:  Continue to monitor per outreach protocol. Pt transported with tech, flowers, purse, and shoes.

## 2018-06-06 ENCOUNTER — PATIENT OUTREACH (OUTPATIENT)
Dept: CASE MANAGEMENT | Age: 68
End: 2018-06-06

## 2018-06-06 NOTE — PROGRESS NOTES
This note will not be viewable in [address] Information received via [address] [facility] for [facility] program S/P discharge 6/5/18. Outreach call #1 made to home number. No answer, left message on  requesting return call. Will plan follow up call within 24 hours if no return call received.

## 2018-06-07 ENCOUNTER — PATIENT OUTREACH (OUTPATIENT)
Dept: CASE MANAGEMENT | Age: 68
End: 2018-06-07

## 2018-06-07 NOTE — Clinical Note
Patient discharged 6/5/18. Dx ESRD,hyperkalemia secondary to missed HD. Needs CCM. Please see PRAKASH note and call me for any questions.  Thanks

## 2018-06-07 NOTE — PROGRESS NOTES
This note will not be viewable in [address] Date/Time of Call:   06/07/2018 1:48pm   What was the patient hospitalized for? ESRD/Hyperkalemia *due to missed HD*     Does the patient understand his/her diagnosis and/or treatment and what happened during the hospitalization? Spoke with patient, who verbalized understanding of Dx and Tx plan. Unsure if patient has full understanding of dx and tx plan. Reports she is doing ok, denies any new complaints. Patient did report concerns over having to change dressings and supplies and unsure of how to perform the dressing change or how to obtain supplies. . Per MSW notes at [facility] patient declined [facility] but when services were discussed with patient, she was very agreeable. Phone call placed to [address] [place] [id number] office and requested referral to [facility], per office staff PCP is out of office until 6/12/18. Also called Judah Horowitz RN case mgr. on 8th floor and requested assistance with [facility] referral as well. Did the patient receive discharge instructions? yes   CM Assessed Risk for Readmission:      Patient stated Risk for Readmission:      Moderate to high risk for readmission due to complications from ESRD, lack of understanding and possible noncompliance with HD. Per patient, more problems from my kidneys. Review any discharge instructions (see discharge instructions/AVS in [facility]). Ask patient if they understand these. Do they have any questions? Discharge instructions reviewed with Mrs. Cheryl Bertrand, who states understanding. Focused on education, medication compliance, and follow up appointments. Were home services ordered (nursing, PT, OT, ST, etc.)? No* see note above*   If so, has the first visit occurred? If not, why? (Assist with coordination of services if necessary.)   N/A   Was any DME ordered? No   If so, has it been received?   If not, why?  (Assist patient in obtaining DME orders &/or equipment if necessary.) N/A   Complete a review of all medications (new, continued and discontinued meds per the D/C instructions and medication tab in [facility]). Medications from CC reviewed with patient. Were all new prescriptions filled? If not, why?  (Assist patient in obtaining medications if necessary  escalate for CCM &/or SW if ongoing issues are verbalized by pt or anticipated)   New prescriptions were filled and in the home. Does the patient understand the purpose and dosing instructions for all medications? (If patient has questions, provide explanation and education.)   Mrs. Nikolay De Luna verbalized understanding of purpose for and dosing of medications. Does the patient have any problems in performing ADLs? (If patient is unable to perform ADLs  what is the limiting factor(s)? Do they have a support system that can assist? If no support system is present, discuss possible assistance that they may be able to obtain. Escalate for CCM/SW if ongoing issues are verbalized by pt or anticipated)   Patient is normally independent with ADLs, uses a walker for ambulation and is in process of trying to get a scooter. Patient lives a roommate who is available to assist as needed. Does the patient have all follow-up appointments scheduled? 7 day f/up with PCP?   (f/up with PCP may be w/in 14 days if patient has a f/up with their specialist w/in 7 days)    7-14 day f/up with specialist?   (or per discharge instructions)    If f/up has not been made  what actions has the care coordinator made to accomplish this? Has transportation been arranged? Patient is scheduled for follow up with  on 6/12/18. Also has follow up with Dr. Shama Castillo on 6/26 for follow up of AV graft insertion. Patient denies any problems with transportation. Any other questions or concerns expressed by the patient? Patient was upset that she has lost all her ID cards while at [facility], she thinks they were possibly kept in ED or by EMS.  Care Coordinator placed a call to Patient Relations and left VM with patients information and also called [facility] and left message for Erna Palacios in risk management with information. Unable to speak with either department in person, so left patients call back information as well. Schedule next appointment with [facility] Coordinator or refer to RN Case Manager/ per the workflow guidelines. When is care coordinators next follow-up call scheduled? If referred for CCM  what RN care manager was the referral assigned? Discussed referral to CCM with patient for continued follow up. Patient agreeable with referral.    Patient will be referred to [facility]. Patient has Care Coordinator contact information and was advised to call for any new concerns or needs.    PRAKASH Call Completed By: Neel Gatica LPN, [facility] Coordinator

## 2018-06-15 ENCOUNTER — HOSPITAL ENCOUNTER (EMERGENCY)
Age: 68
Discharge: HOME | DRG: 252 | End: 2018-06-15
Attending: EMERGENCY MEDICINE
Payer: MEDICARE

## 2018-06-15 VITALS
TEMPERATURE: 98.2 F | SYSTOLIC BLOOD PRESSURE: 161 MMHG | OXYGEN SATURATION: 94 % | BODY MASS INDEX: 57.45 KG/M2 | DIASTOLIC BLOOD PRESSURE: 92 MMHG | HEART RATE: 95 BPM | WEIGHT: 285 LBS | RESPIRATION RATE: 18 BRPM | HEIGHT: 59 IN

## 2018-06-15 DIAGNOSIS — R23.8: Primary | ICD-10-CM

## 2018-06-15 PROCEDURE — 99284 EMERGENCY DEPT VISIT MOD MDM: CPT | Performed by: EMERGENCY MEDICINE

## 2018-06-15 NOTE — DISCHARGE INSTRUCTIONS
Daily dressing changes with xeroform underneath usual bandages  Follow up with dr Lynsey Mcmillan next week, dialyze Monday as scheduled  Return to the e.r.  For any emergencies

## 2018-06-15 NOTE — ED PROVIDER NOTES
HPI Comments: Dialysis patient with new av graft placed 5/29/18  Went to dialysis and they told her to go to the e.r. (had a full run today)  developed a bullae/blister to the incision site which was decompressed by her vascular surgeron - dr Mackenzie Morales, a few days ago    Patient is a 70 y.o. female presenting with vascular access problem. The history is provided by the patient. Vascular Access Problem    The current episode started more than 2 days ago. Past Medical History:   Diagnosis Date    Chronic kidney disease     ESRD    Dialysis patient ([facility] [address]     GERD (gastroesophageal reflux disease)     Hyperlipidemia     Hypertension 6/16/2010    Hypertension     Kidney failure     Liver disease     hepatitis C    Obesity     Other ill-defined conditions(799.89)     peripheral neuropathy, Pt states she doesn't have COPD or HTN    Peripheral neuropathy     Psychotic disorder        Past Surgical History:   Procedure Laterality Date    HX OTHER SURGICAL      access--left leg. axcess placed in both upper arms     HX UROLOGICAL      left nephrectomy         Family History:   Problem Relation Age of Onset    Heart Disease Mother     Hypertension Mother     Diabetes Mother     Hypertension Father        Social History     Social History    Marital status:      Spouse name: N/A    Number of children: N/A    Years of education: N/A     Occupational History    Not on file. Social History Main Topics    Smoking status: Never Smoker    Smokeless tobacco: Never Used    Alcohol use No    Drug use: No      Comment: says even if she did she wouldn't tell me    Sexual activity: Not on file     Other Topics Concern    Not on file     Social History Narrative         ALLERGIES: Vancomycin    Review of Systems   Skin: Positive for color change and wound.        Vitals:    06/15/18 1550   BP: 143/79   Pulse: 98   Resp: 16   Temp: 98.2 °F (36.8 °C)   SpO2: 94%   Weight: 129.3 kg (285 lb) Height: 4' 11\" (1.499 m)            Physical Exam   Constitutional: She is oriented to person, place, and time. She appears well-developed and well-nourished. No distress. HENT:   Head: Normocephalic and atraumatic. Eyes: Conjunctivae and EOM are normal. Right eye exhibits no discharge. Left eye exhibits no discharge. Neck: Normal range of motion. Neck supple. Pulmonary/Chest: Effort normal. No respiratory distress. Musculoskeletal: Normal range of motion. She exhibits no tenderness. Neurological: She is alert and oriented to person, place, and time. She has normal strength. She exhibits normal muscle tone. cni 2-12 grossly  Nl gait,  Nl speech     Skin: Skin is warm and dry. Rash noted. She is not diaphoretic. Psychiatric: She has a normal mood and affect. Her behavior is normal.   Nursing note and vitals reviewed. MDM  Number of Diagnoses or Management Options  Blister of periwound skin:   Diagnosis management comments: Medical decision making note:  Stable appearing eschar/bullae remnant  D/w dr Brielle Browne, her vasc. Surgeon  Prefers to avoid silvadene, just dress with xeroform, and bandage  F/u next week  This concludes the \"medical decision making note\" part of this emergency department visit note.           ED Course       Procedures

## 2018-06-15 NOTE — ED TRIAGE NOTES
Pt states having a blister in the new dialysis graft she had placed a couple weeks ago in the right leg.

## 2018-06-15 NOTE — ED NOTES

## 2018-06-18 ENCOUNTER — HOSPITAL ENCOUNTER (INPATIENT)
Age: 68
LOS: 4 days | Discharge: NURSING, REHAB OR LONG-TERM CARE | DRG: 252 | End: 2018-06-22
Attending: SURGERY | Admitting: SURGERY
Payer: MEDICARE

## 2018-06-18 ENCOUNTER — ANESTHESIA (OUTPATIENT)
Dept: SURGERY | Age: 68
DRG: 252 | End: 2018-06-18
Payer: MEDICARE

## 2018-06-18 ENCOUNTER — ANESTHESIA EVENT (OUTPATIENT)
Dept: SURGERY | Age: 68
DRG: 252 | End: 2018-06-18
Payer: MEDICARE

## 2018-06-18 DIAGNOSIS — T82.7XXA INFECTION OF AV GRAFT FOR DIALYSIS (HCC): Primary | ICD-10-CM

## 2018-06-18 LAB
POTASSIUM BLD-SCNC: 5.9 MMOL/L (ref 3.5–5.1)
POTASSIUM SERPL-SCNC: 5.7 MMOL/L (ref 3.5–5.1)

## 2018-06-18 PROCEDURE — 84132 ASSAY OF SERUM POTASSIUM: CPT | Performed by: SURGERY

## 2018-06-18 PROCEDURE — 77030013727 HC IRR FAN PULSVC ZIMM -B: Performed by: SURGERY

## 2018-06-18 PROCEDURE — 74011250636 HC RX REV CODE- 250/636

## 2018-06-18 PROCEDURE — 74011250636 HC RX REV CODE- 250/636: Performed by: SURGERY

## 2018-06-18 PROCEDURE — 74011250637 HC RX REV CODE- 250/637: Performed by: ANESTHESIOLOGY

## 2018-06-18 PROCEDURE — 76210000016 HC OR PH I REC 1 TO 1.5 HR: Performed by: SURGERY

## 2018-06-18 PROCEDURE — 77030020270 HC MISC VASC IMPL: Performed by: SURGERY

## 2018-06-18 PROCEDURE — 77030011640 HC PAD GRND REM COVD -A: Performed by: SURGERY

## 2018-06-18 PROCEDURE — 77030002996 HC SUT SLK J&J -A: Performed by: SURGERY

## 2018-06-18 PROCEDURE — 76010000162 HC OR TIME 1.5 TO 2 HR INTENSV-TIER 1: Performed by: SURGERY

## 2018-06-18 PROCEDURE — 77030032490 HC SLV COMPR SCD KNE COVD -B: Performed by: SURGERY

## 2018-06-18 PROCEDURE — 77030020143 HC AIRWY LARYN INTUB CGAS -A: Performed by: ANESTHESIOLOGY

## 2018-06-18 PROCEDURE — 04PY0JZ REMOVAL OF SYNTHETIC SUBSTITUTE FROM LOWER ARTERY, OPEN APPROACH: ICD-10-PCS | Performed by: SURGERY

## 2018-06-18 PROCEDURE — 74011000250 HC RX REV CODE- 250

## 2018-06-18 PROCEDURE — 77030002987 HC SUT PROL J&J -B: Performed by: SURGERY

## 2018-06-18 PROCEDURE — 74011250637 HC RX REV CODE- 250/637: Performed by: SURGERY

## 2018-06-18 PROCEDURE — 74750000023 HC WOUND THERAPY

## 2018-06-18 PROCEDURE — 76060000035 HC ANESTHESIA 2 TO 2.5 HR: Performed by: SURGERY

## 2018-06-18 PROCEDURE — 77030020782 HC GWN BAIR PAWS FLX 3M -B: Performed by: ANESTHESIOLOGY

## 2018-06-18 PROCEDURE — 77030018836 HC SOL IRR NACL ICUM -A: Performed by: SURGERY

## 2018-06-18 PROCEDURE — 65270000029 HC RM PRIVATE

## 2018-06-18 PROCEDURE — 84132 ASSAY OF SERUM POTASSIUM: CPT

## 2018-06-18 PROCEDURE — 04UK0KZ SUPPLEMENT RIGHT FEMORAL ARTERY WITH NONAUTOLOGOUS TISSUE SUBSTITUTE, OPEN APPROACH: ICD-10-PCS | Performed by: SURGERY

## 2018-06-18 PROCEDURE — 77030002986 HC SUT PROL J&J -A: Performed by: SURGERY

## 2018-06-18 PROCEDURE — 74011000250 HC RX REV CODE- 250: Performed by: ANESTHESIOLOGY

## 2018-06-18 PROCEDURE — 74011250636 HC RX REV CODE- 250/636: Performed by: ANESTHESIOLOGY

## 2018-06-18 PROCEDURE — 74011000250 HC RX REV CODE- 250: Performed by: SURGERY

## 2018-06-18 PROCEDURE — 77030031139 HC SUT VCRL2 J&J -A: Performed by: SURGERY

## 2018-06-18 DEVICE — VASCURE FOR VASCULAR REPAIR IS INTENDED FOR USE AS A PATCH MATERIAL FOR REPAIR AND RECONSTRUCTION OF PERIPHERAL VASCULATURE INCLUDING THE CAROTID, RENAL, ILIAC, FEMORAL, AND TIBIAL BLOOD VESSELS. VASCURE FOR VASCULAR REPAIR MAY BE USED FOR PATCH CLOSURE OF VESSELS, AS A PLEDGET, OR FOR SUTURE LINE BUTTRESSING WHEN REPAIRING PERIPHERAL VESSELS.
Type: IMPLANTABLE DEVICE | Site: LEG | Status: FUNCTIONAL
Brand: VASCURE FOR VASCULAR REPAIR

## 2018-06-18 RX ORDER — ASPIRIN 81 MG/1
81 TABLET ORAL DAILY
Status: DISCONTINUED | OUTPATIENT
Start: 2018-06-19 | End: 2018-06-22 | Stop reason: HOSPADM

## 2018-06-18 RX ORDER — BACITRACIN 50000 [IU]/1
INJECTION, POWDER, FOR SOLUTION INTRAMUSCULAR AS NEEDED
Status: DISCONTINUED | OUTPATIENT
Start: 2018-06-18 | End: 2018-06-18 | Stop reason: HOSPADM

## 2018-06-18 RX ORDER — OXYCODONE AND ACETAMINOPHEN 5; 325 MG/1; MG/1
1 TABLET ORAL
Status: DISCONTINUED | OUTPATIENT
Start: 2018-06-18 | End: 2018-06-22 | Stop reason: HOSPADM

## 2018-06-18 RX ORDER — SODIUM CHLORIDE, SODIUM LACTATE, POTASSIUM CHLORIDE, CALCIUM CHLORIDE 600; 310; 30; 20 MG/100ML; MG/100ML; MG/100ML; MG/100ML
75 INJECTION, SOLUTION INTRAVENOUS CONTINUOUS
Status: DISCONTINUED | OUTPATIENT
Start: 2018-06-18 | End: 2018-06-18

## 2018-06-18 RX ORDER — HEPARIN SODIUM 1000 [USP'U]/ML
INJECTION, SOLUTION INTRAVENOUS; SUBCUTANEOUS AS NEEDED
Status: DISCONTINUED | OUTPATIENT
Start: 2018-06-18 | End: 2018-06-18 | Stop reason: HOSPADM

## 2018-06-18 RX ORDER — ALPRAZOLAM 0.5 MG/1
0.5 TABLET ORAL
Status: DISCONTINUED | OUTPATIENT
Start: 2018-06-18 | End: 2018-06-22 | Stop reason: HOSPADM

## 2018-06-18 RX ORDER — SODIUM CHLORIDE, SODIUM LACTATE, POTASSIUM CHLORIDE, CALCIUM CHLORIDE 600; 310; 30; 20 MG/100ML; MG/100ML; MG/100ML; MG/100ML
75 INJECTION, SOLUTION INTRAVENOUS CONTINUOUS
Status: DISCONTINUED | OUTPATIENT
Start: 2018-06-18 | End: 2018-06-18 | Stop reason: HOSPADM

## 2018-06-18 RX ORDER — MORPHINE SULFATE 8 MG/ML
2 INJECTION, SOLUTION INTRAMUSCULAR; INTRAVENOUS
Status: DISCONTINUED | OUTPATIENT
Start: 2018-06-18 | End: 2018-06-22 | Stop reason: HOSPADM

## 2018-06-18 RX ORDER — LIDOCAINE HYDROCHLORIDE 20 MG/ML
INJECTION, SOLUTION EPIDURAL; INFILTRATION; INTRACAUDAL; PERINEURAL AS NEEDED
Status: DISCONTINUED | OUTPATIENT
Start: 2018-06-18 | End: 2018-06-18 | Stop reason: HOSPADM

## 2018-06-18 RX ORDER — EPHEDRINE SULFATE 50 MG/ML
INJECTION, SOLUTION INTRAVENOUS AS NEEDED
Status: DISCONTINUED | OUTPATIENT
Start: 2018-06-18 | End: 2018-06-18 | Stop reason: HOSPADM

## 2018-06-18 RX ORDER — MIDAZOLAM HYDROCHLORIDE 1 MG/ML
5 INJECTION, SOLUTION INTRAMUSCULAR; INTRAVENOUS ONCE
Status: DISCONTINUED | OUTPATIENT
Start: 2018-06-18 | End: 2018-06-18 | Stop reason: HOSPADM

## 2018-06-18 RX ORDER — PROPOFOL 10 MG/ML
INJECTION, EMULSION INTRAVENOUS AS NEEDED
Status: DISCONTINUED | OUTPATIENT
Start: 2018-06-18 | End: 2018-06-18 | Stop reason: HOSPADM

## 2018-06-18 RX ORDER — FENTANYL CITRATE 50 UG/ML
INJECTION, SOLUTION INTRAMUSCULAR; INTRAVENOUS AS NEEDED
Status: DISCONTINUED | OUTPATIENT
Start: 2018-06-18 | End: 2018-06-18 | Stop reason: HOSPADM

## 2018-06-18 RX ORDER — HYDROCODONE BITARTRATE AND ACETAMINOPHEN 5; 325 MG/1; MG/1
2 TABLET ORAL
Status: COMPLETED | OUTPATIENT
Start: 2018-06-18 | End: 2018-06-18

## 2018-06-18 RX ORDER — PROTAMINE SULFATE 10 MG/ML
INJECTION, SOLUTION INTRAVENOUS AS NEEDED
Status: DISCONTINUED | OUTPATIENT
Start: 2018-06-18 | End: 2018-06-18 | Stop reason: HOSPADM

## 2018-06-18 RX ORDER — ALBUTEROL SULFATE 90 UG/1
1 AEROSOL, METERED RESPIRATORY (INHALATION)
Status: DISCONTINUED | OUTPATIENT
Start: 2018-06-18 | End: 2018-06-22 | Stop reason: HOSPADM

## 2018-06-18 RX ORDER — OXYCODONE HYDROCHLORIDE 5 MG/1
5 TABLET ORAL
Status: DISCONTINUED | OUTPATIENT
Start: 2018-06-18 | End: 2018-06-18

## 2018-06-18 RX ORDER — CINACALCET 30 MG/1
30 TABLET, FILM COATED ORAL
Status: DISCONTINUED | OUTPATIENT
Start: 2018-06-18 | End: 2018-06-22 | Stop reason: HOSPADM

## 2018-06-18 RX ORDER — MIDAZOLAM HYDROCHLORIDE 1 MG/ML
2 INJECTION, SOLUTION INTRAMUSCULAR; INTRAVENOUS
Status: DISCONTINUED | OUTPATIENT
Start: 2018-06-18 | End: 2018-06-18 | Stop reason: HOSPADM

## 2018-06-18 RX ORDER — FAMOTIDINE 20 MG/1
20 TABLET, FILM COATED ORAL ONCE
Status: COMPLETED | OUTPATIENT
Start: 2018-06-18 | End: 2018-06-18

## 2018-06-18 RX ORDER — LIDOCAINE HYDROCHLORIDE 10 MG/ML
0.1 INJECTION INFILTRATION; PERINEURAL AS NEEDED
Status: DISCONTINUED | OUTPATIENT
Start: 2018-06-18 | End: 2018-06-18 | Stop reason: HOSPADM

## 2018-06-18 RX ORDER — HEPARIN SODIUM 5000 [USP'U]/ML
5000 INJECTION, SOLUTION INTRAVENOUS; SUBCUTANEOUS EVERY 8 HOURS
Status: DISCONTINUED | OUTPATIENT
Start: 2018-06-19 | End: 2018-06-22 | Stop reason: HOSPADM

## 2018-06-18 RX ORDER — ONDANSETRON 2 MG/ML
INJECTION INTRAMUSCULAR; INTRAVENOUS AS NEEDED
Status: DISCONTINUED | OUTPATIENT
Start: 2018-06-18 | End: 2018-06-18 | Stop reason: HOSPADM

## 2018-06-18 RX ORDER — CEFAZOLIN SODIUM/WATER 2 G/20 ML
2 SYRINGE (ML) INTRAVENOUS ONCE
Status: COMPLETED | OUTPATIENT
Start: 2018-06-18 | End: 2018-06-18

## 2018-06-18 RX ORDER — HYDROMORPHONE HYDROCHLORIDE 2 MG/ML
0.5 INJECTION, SOLUTION INTRAMUSCULAR; INTRAVENOUS; SUBCUTANEOUS
Status: DISCONTINUED | OUTPATIENT
Start: 2018-06-18 | End: 2018-06-18

## 2018-06-18 RX ORDER — FENTANYL CITRATE 50 UG/ML
100 INJECTION, SOLUTION INTRAMUSCULAR; INTRAVENOUS ONCE
Status: DISCONTINUED | OUTPATIENT
Start: 2018-06-18 | End: 2018-06-18 | Stop reason: HOSPADM

## 2018-06-18 RX ORDER — HYDROCODONE BITARTRATE AND ACETAMINOPHEN 5; 325 MG/1; MG/1
2 TABLET ORAL AS NEEDED
Status: DISCONTINUED | OUTPATIENT
Start: 2018-06-18 | End: 2018-06-18

## 2018-06-18 RX ORDER — HYDROMORPHONE HYDROCHLORIDE 1 MG/ML
0.5 INJECTION, SOLUTION INTRAMUSCULAR; INTRAVENOUS; SUBCUTANEOUS
Status: DISCONTINUED | OUTPATIENT
Start: 2018-06-18 | End: 2018-06-22 | Stop reason: HOSPADM

## 2018-06-18 RX ADMIN — FENTANYL CITRATE 50 MCG: 50 INJECTION, SOLUTION INTRAMUSCULAR; INTRAVENOUS at 15:45

## 2018-06-18 RX ADMIN — FAMOTIDINE 20 MG: 20 TABLET, FILM COATED ORAL at 13:13

## 2018-06-18 RX ADMIN — EPHEDRINE SULFATE 10 MG: 50 INJECTION, SOLUTION INTRAVENOUS at 15:55

## 2018-06-18 RX ADMIN — Medication 2 G: at 15:40

## 2018-06-18 RX ADMIN — CINACALCET HYDROCHLORIDE 30 MG: 30 TABLET, COATED ORAL at 21:01

## 2018-06-18 RX ADMIN — SODIUM CHLORIDE 6.25 MG: 9 INJECTION INTRAMUSCULAR; INTRAVENOUS; SUBCUTANEOUS at 19:00

## 2018-06-18 RX ADMIN — SODIUM CHLORIDE, SODIUM LACTATE, POTASSIUM CHLORIDE, AND CALCIUM CHLORIDE: 600; 310; 30; 20 INJECTION, SOLUTION INTRAVENOUS at 15:32

## 2018-06-18 RX ADMIN — HYDROCODONE BITARTRATE AND ACETAMINOPHEN 2 TABLET: 5; 325 TABLET ORAL at 18:24

## 2018-06-18 RX ADMIN — OXYCODONE HYDROCHLORIDE AND ACETAMINOPHEN 1 TABLET: 5; 325 TABLET ORAL at 22:52

## 2018-06-18 RX ADMIN — HYDROMORPHONE HYDROCHLORIDE 0.5 MG: 1 INJECTION, SOLUTION INTRAMUSCULAR; INTRAVENOUS; SUBCUTANEOUS at 18:11

## 2018-06-18 RX ADMIN — HEPARIN SODIUM 3000 UNITS: 1000 INJECTION, SOLUTION INTRAVENOUS; SUBCUTANEOUS at 16:23

## 2018-06-18 RX ADMIN — LIDOCAINE HYDROCHLORIDE 100 MG: 20 INJECTION, SOLUTION EPIDURAL; INFILTRATION; INTRACAUDAL; PERINEURAL at 15:45

## 2018-06-18 RX ADMIN — FENTANYL CITRATE 25 MCG: 50 INJECTION, SOLUTION INTRAMUSCULAR; INTRAVENOUS at 16:22

## 2018-06-18 RX ADMIN — ONDANSETRON 4 MG: 2 INJECTION INTRAMUSCULAR; INTRAVENOUS at 17:14

## 2018-06-18 RX ADMIN — FENTANYL CITRATE 25 MCG: 50 INJECTION, SOLUTION INTRAMUSCULAR; INTRAVENOUS at 16:10

## 2018-06-18 RX ADMIN — PROTAMINE SULFATE 20 MG: 10 INJECTION, SOLUTION INTRAVENOUS at 17:03

## 2018-06-18 RX ADMIN — PROPOFOL 150 MG: 10 INJECTION, EMULSION INTRAVENOUS at 15:45

## 2018-06-18 RX ADMIN — HYDROMORPHONE HYDROCHLORIDE 0.5 MG: 1 INJECTION, SOLUTION INTRAMUSCULAR; INTRAVENOUS; SUBCUTANEOUS at 18:53

## 2018-06-18 NOTE — BRIEF OP NOTE
BRIEF OPERATIVE NOTE    Date of Procedure: 6/18/2018   Preoperative Diagnosis: NECROTIC RIGHT THIGH WOUND  Postoperative Diagnosis: NECROTIC RIGHT THIGH WOUND    Procedure(s):  DEBRIDEMENT OF RIGHT THIGH WOUND/ REMOVAL OF AV GRAFT/WOUND VAC  Surgeon(s) and Role:     * Marjorie Ogden MD - Primary         Surgical Assistant:     Surgical Staff:  Circ-1: Diamond Peterson RN  Scrub Tech-1: Saintclair Kemp Cash  Scrub Tech-2: Tuan Quach CNA  Event Time In   Incision Start  4:01 PM   Incision Close  5:15 PM     Anesthesia: General   Estimated Blood Loss: 100CC  Specimens: * No specimens in log *   Findings:     Complications: None  Implants:   Implant Name Type Inv.  Item Serial No.  Lot No. LRB No. Used Action   GRAFT CORMATRIX ECM 1CM X 10CM - [id number] Graft GRAFT CORMAT[employer]X ECM 1CM X 10CM     [id number] Right 1 Implanted

## 2018-06-18 NOTE — ANESTHESIA PREPROCEDURE EVALUATION
Anesthetic History   No history of anesthetic complications            Review of Systems / Medical History  Patient summary reviewed and pertinent labs reviewed    Pulmonary    COPD: mild               Neuro/Psych   Within defined limits           Cardiovascular    Hypertension: well controlled      CHF: NYHA Classification III    CAD    Exercise tolerance: >4 METS  Comments: TAVR 4-2018   GI/Hepatic/Renal     GERD: well controlled  Hepatitis: type C  Renal disease: ESRD and dialysis       Endo/Other        Morbid obesity     Other Findings            Physical Exam    Airway  Mallampati: II  TM Distance: 4 - 6 cm  Neck ROM: normal range of motion   Mouth opening: Normal     Cardiovascular  Regular rate and rhythm,  S1 and S2 normal,  no murmur, click, rub, or gallop             Dental         Pulmonary  Breath sounds clear to auscultation               Abdominal  GI exam deferred       Other Findings            Anesthetic Plan    ASA: 4, emergent  Anesthesia type: general          Induction: Intravenous  Anesthetic plan and risks discussed with: Patient

## 2018-06-18 NOTE — PROGRESS NOTES
TRANSFER - IN REPORT:    Verbal report received from [facility] on [employer]  being received from PACU for routine progression of care      Report consisted of patients Situation, Background, Assessment and   Recommendations(SBAR). Information from the following report(s) SBAR, OR Summary, MAR and Recent Results was reviewed with the receiving nurse. Opportunity for questions and clarification was provided. Assessment completed upon patients arrival to unit and care assumed.

## 2018-06-18 NOTE — PERIOP NOTES
TRANSFER - OUT REPORT:    Verbal report given to RAKESH(name) on Shalini Barker  being transferred to [facility]  (unit) for routine post - op       Report consisted of patients Situation, Background, Assessment and   Recommendations(SBAR). Information from the following report(s) SBAR, OR Summary, Procedure Summary, Intake/Output and MAR was reviewed with the receiving nurse. Lines:   Peripheral IV 06/18/18 Left Hand (Active)   Site Assessment Clean, dry, & intact 6/18/2018  5:59 PM   Phlebitis Assessment 0 6/18/2018  5:59 PM   Infiltration Assessment 0 6/18/2018  5:59 PM   Dressing Status Clean, dry, & intact 6/18/2018  5:59 PM   Dressing Type Tape;Transparent 6/18/2018  5:59 PM   Hub Color/Line Status Blue;Capped;Flushed 6/18/2018  5:59 PM        Opportunity for questions and clarification was provided. Patient transported with:   O2 @ 2 liters    VTE prophylaxis orders have been written for Shalini Barker. Patient and family given floor number and nurses name. Family updated re: pt status after security code verified.

## 2018-06-19 LAB
ERYTHROCYTE [DISTWIDTH] IN BLOOD BY AUTOMATED COUNT: 15 % (ref 11.9–14.6)
HCT VFR BLD AUTO: 29.9 % (ref 35.8–46.3)
HGB BLD-MCNC: 9.5 G/DL (ref 11.7–15.4)
MCH RBC QN AUTO: 27.6 PG (ref 26.1–32.9)
MCHC RBC AUTO-ENTMCNC: 31.8 G/DL (ref 31.4–35)
MCV RBC AUTO: 86.9 FL (ref 79.6–97.8)
PHOSPHATE SERPL-MCNC: 4.5 MG/DL (ref 2.3–3.7)
PLATELET # BLD AUTO: 218 K/UL (ref 150–450)
PMV BLD AUTO: 9.2 FL (ref 10.8–14.1)
RBC # BLD AUTO: 3.44 M/UL (ref 4.05–5.25)
WBC # BLD AUTO: 6.3 K/UL (ref 4.3–11.1)

## 2018-06-19 PROCEDURE — 84100 ASSAY OF PHOSPHORUS: CPT | Performed by: INTERNAL MEDICINE

## 2018-06-19 PROCEDURE — 74011250637 HC RX REV CODE- 250/637: Performed by: INTERNAL MEDICINE

## 2018-06-19 PROCEDURE — 77030027138 HC INCENT SPIROMETER -A

## 2018-06-19 PROCEDURE — 74011250636 HC RX REV CODE- 250/636: Performed by: SURGERY

## 2018-06-19 PROCEDURE — 90935 HEMODIALYSIS ONE EVALUATION: CPT

## 2018-06-19 PROCEDURE — 65270000029 HC RM PRIVATE

## 2018-06-19 PROCEDURE — 86580 TB INTRADERMAL TEST: CPT | Performed by: SURGERY

## 2018-06-19 PROCEDURE — 5A1D70Z PERFORMANCE OF URINARY FILTRATION, INTERMITTENT, LESS THAN 6 HOURS PER DAY: ICD-10-PCS | Performed by: INTERNAL MEDICINE

## 2018-06-19 PROCEDURE — 74011250637 HC RX REV CODE- 250/637: Performed by: PHYSICIAN ASSISTANT

## 2018-06-19 PROCEDURE — 74011000302 HC RX REV CODE- 302: Performed by: SURGERY

## 2018-06-19 PROCEDURE — 36415 COLL VENOUS BLD VENIPUNCTURE: CPT | Performed by: INTERNAL MEDICINE

## 2018-06-19 PROCEDURE — 74011250637 HC RX REV CODE- 250/637: Performed by: SURGERY

## 2018-06-19 PROCEDURE — 85027 COMPLETE CBC AUTOMATED: CPT | Performed by: INTERNAL MEDICINE

## 2018-06-19 PROCEDURE — 74750000023 HC WOUND THERAPY

## 2018-06-19 RX ORDER — GABAPENTIN 100 MG/1
100 CAPSULE ORAL 2 TIMES DAILY
Status: DISCONTINUED | OUTPATIENT
Start: 2018-06-19 | End: 2018-06-22 | Stop reason: HOSPADM

## 2018-06-19 RX ORDER — AMOXICILLIN AND CLAVULANATE POTASSIUM 500; 125 MG/1; MG/1
1 TABLET, FILM COATED ORAL EVERY 24 HOURS
Status: DISCONTINUED | OUTPATIENT
Start: 2018-06-19 | End: 2018-06-22 | Stop reason: HOSPADM

## 2018-06-19 RX ORDER — HEPARIN SODIUM 1000 [USP'U]/ML
5000 INJECTION, SOLUTION INTRAVENOUS; SUBCUTANEOUS
Status: DISCONTINUED | OUTPATIENT
Start: 2018-06-19 | End: 2018-06-22 | Stop reason: HOSPADM

## 2018-06-19 RX ADMIN — HEPARIN SODIUM 5000 UNITS: 5000 INJECTION, SOLUTION INTRAVENOUS; SUBCUTANEOUS at 17:23

## 2018-06-19 RX ADMIN — OXYCODONE HYDROCHLORIDE AND ACETAMINOPHEN 1 TABLET: 5; 325 TABLET ORAL at 07:38

## 2018-06-19 RX ADMIN — GABAPENTIN 100 MG: 100 CAPSULE ORAL at 17:23

## 2018-06-19 RX ADMIN — CINACALCET HYDROCHLORIDE 30 MG: 30 TABLET, COATED ORAL at 20:21

## 2018-06-19 RX ADMIN — OXYCODONE HYDROCHLORIDE AND ACETAMINOPHEN 1 TABLET: 5; 325 TABLET ORAL at 02:56

## 2018-06-19 RX ADMIN — TUBERCULIN PURIFIED PROTEIN DERIVATIVE 5 UNITS: 5 INJECTION, SOLUTION INTRADERMAL at 17:24

## 2018-06-19 RX ADMIN — AMOXICILLIN AND CLAVULANATE POTASSIUM 1 TABLET: 500; 125 TABLET, FILM COATED ORAL at 17:35

## 2018-06-19 RX ADMIN — ASPIRIN 81 MG: 81 TABLET, COATED ORAL at 08:53

## 2018-06-19 RX ADMIN — OXYCODONE HYDROCHLORIDE AND ACETAMINOPHEN 1 TABLET: 5; 325 TABLET ORAL at 20:22

## 2018-06-19 RX ADMIN — OXYCODONE HYDROCHLORIDE AND ACETAMINOPHEN 1 TABLET: 5; 325 TABLET ORAL at 12:01

## 2018-06-19 NOTE — CONSULTS
Fátima 18  MR#: [id number]  : 1950  ACCOUNT #: [de-identified]   DATE OF SERVICE: 2018    REASON FOR CONSULTATION:  Patient admitted with infected AV graft, has history of end-stage renal disease, needs dialysis. HISTORY OF PRESENT ILLNESS:  This patient is a 76-year-old female with history of end-stage renal disease on hemodialysis Monday, Wednesday and Friday at [facility] unit, was admitted yesterday because of the infected AV graft, which was placed last week ago. The patient had to be taken to the OR yesterday to have drainage of the abscess and removal of the AV graft in the right thigh and placement of the wound VAC. REVIEW OF SYSTEMS:  The patient denies any fever or chills. No shortness of breath. No chest pain. She has chronic arthritis with pain in the hands especially. Mild lower extremity edema of the right side where the graft was removed. The patient getting her dialysis with Perm-A-Cath currently. No GI symptoms. No  symptoms. PAST MEDICAL HISTORY:  History of end-stage renal disease on hemodialysis at [facility] Monday, Wednesday and Friday, COPD, hypertension, obesity, anemia, history of noncompliance, history of dyslipidemia, pulmonary hypertension, rheumatic mitral regurgitation. CURRENT MEDICATIONS:  Aspirin 81 mg a day, Sensipar 30 mg once a day, Heparin 5000 units subcutaneous q.8 hours. PHYSICAL EXAMINATION:  VITAL SIGNS:  Temperature is 98.0, heart rate 76, blood pressure 155/88. GENERAL:  Patient is not in acute respiratory distress. NECK:  No lymph node. No thyromegaly. LUNGS:  Clear to auscultation bilaterally. No wheezing, no crackles. HEART:  Regular rhythm. There is systolic murmur III/VI of the left sternal border. No rub. ABDOMEN:  Soft, nontender, no organomegaly, no mass. EXTREMITIES:  There is a wound VAC in the right thigh.   Patient has Perm-A-Cath in the right chest.    LABORATORY DATA:  Significant for hyperkalemia with potassium 5.7. No CBC available. RECOMMENDATIONS:  Patient would be dialyzed today and we will check a CBC. Would continue to follow.       MD ANUSHA Tilley / GRACE  D: 06/19/2018 10:30     T: 06/19/2018 10:56  JOB #: [id number]

## 2018-06-19 NOTE — PROGRESS NOTES
Patient refused to have home meds locked up and was educated on not taking them while in hospital stay. Patient stated her friend at bedside will take them home since they live together.

## 2018-06-19 NOTE — PROGRESS NOTES
Met with pt and female roommate Fransisca Lima [phone] about DC plan, pt requests to go to SNF at DC d/t she does not feel she will be able to care for wound vac at home. Request referral to [facility], placed in [address] [place] [address] link will await response. PT states lives in single level home with ramp in rear of home to enter, has rollator walker at home for use, PCP confirmed, Insurance confirmed. CM will continue to follow for needs. PPD ordered, PT/OT requested to see pt. Care Management Interventions  PCP Verified by CM:  Yes  Current Support Network:  (lives with female roommate)  Confirm Follow Up Transport: Friends  Plan discussed with Pt/Family/Caregiver: Yes  Freedom of Choice Offered: Yes  Discharge Location  Discharge Placement: Unable to determine at this time

## 2018-06-19 NOTE — DIALYSIS
HD treatment completed without complication. Total of 2.5 kgs removed. VS stable, bp 116/62 at end of tx. CVC dressing clean, dry, and intact, tego caps intact, bilateral lumen flushed with 9cc NS and curos applied. Transport contacted for return to room. No complaints at this time.

## 2018-06-19 NOTE — PROGRESS NOTES
Progress Note    Patient: Sara Urbina MRN: [id number]  SSN: [id number]    YOB: 1950  Age: 70 y.o. Sex: female      Admission Date: 6/18/2018    LOS: 1 day     1 Day Post-Op    PROCEDURE(S):  Debridement of right thigh wound, removal of AV graft, placement of wound vac    Subjective:     Patient has no new complaints, reports minimal pain at right thigh. HD earlier today. Patient would like to go to skilled nursing facility before returning home. Objective:     Vitals:    06/19/18 1303 06/19/18 1331 06/19/18 1354 06/19/18 1416   BP: 143/64 134/72 116/62 122/51   Pulse: 77 66 78 80   Resp:    18   Temp:    97.8 °F (36.6 °C)   SpO2:    97%        Intake and Output:  Current Shift: 06/19 0701 - 06/19 1900  In: -   Out: 3000   Last three shifts: 06/17 1901 - 06/19 0700  In: 500 [I.V.:500]  Out: 30     Physical Exam:   GENERAL: alert, cooperative, no distress  EYE: EOM intact, no nystagmus  LUNG: clear to auscultation bilaterally  HEART: regular rate and rhythm  ABDOMEN: soft, nontender, nondistended, bowel sounds normoactive  EXTREMITIES: wound vac in place at right proximal thigh, serous in cannister; old non-functioning HD accesses in B UE, left thigh  PULSES: radial and brachial palpable 2+ and symmetric bilaterally; feet warm and NS intact but dorsalis pedis and posterior tibialis not easily palpable    Lab/Data Review: All lab results for the last 24 hours reviewed. Assessment / Plan: Active Problems:    Infection of AV graft for dialysis ([facility] [address] (6/18/2018)        Will remove wound vac tomorrow and assess further need  PT / OT eval for SNF  PPD  ABX per clinical pharmacist      Signed By: Bridget Allen PA-C    June 19, 2018      Physician Assistant with Vascular Surgery Milli Sebastian MD / Lakisha Napoles.  Zoe Severe, MD / Philip Jaime MD

## 2018-06-19 NOTE — PROGRESS NOTES
Problem: Falls - Risk of  Goal: *Absence of Falls  Document Farhan Fall Risk and appropriate interventions in the flowsheet. Outcome: Progressing Towards Goal  Fall Risk Interventions:  Mobility Interventions: Bed/chair exit alarm, Patient to call before getting OOB    Mentation Interventions: Adequate sleep, hydration, pain control, Bed/chair exit alarm    Medication Interventions: Bed/chair exit alarm, Patient to call before getting OOB    Elimination Interventions: Bed/chair exit alarm, Call light in reach    History of Falls Interventions: Bed/chair exit alarm        Problem: Pressure Injury - Risk of  Goal: *Prevention of pressure injury  Document James Scale and appropriate interventions in the flowsheet.   Outcome: Progressing Towards Goal  Pressure Injury Interventions:  Sensory Interventions: Assess changes in LOC    Moisture Interventions: Absorbent underpads    Activity Interventions: PT/OT evaluation, Pressure redistribution bed/mattress(bed type), Increase time out of bed    Mobility Interventions: HOB 30 degrees or less, Pressure redistribution bed/mattress (bed type), PT/OT evaluation    Nutrition Interventions: Document food/fluid/supplement intake    Friction and Shear Interventions: HOB 30 degrees or less, Apply protective barrier, creams and emollients

## 2018-06-19 NOTE — PHYSICIAN ADVISORY
Letter of Determination: Inpatient Status Appropriate    This patient was originally hospitalized as Inpatient Status on 6/18/2018 for debridement of right thigh wound and removal of arterial-vanous graft. This patient is appropriate for Inpatient Admission in accordance with CMS regulation Section 43 .3. Specifically, patient's stay is expected to be more than Two Midnights and was medically necessary. The patient's stay was medically necessary based on significant risk factors for surgical intervention including morbid obesity, New York Heart Association class III heart failure, chronic obstructive pulmponary disease, Hypertension, end stage renal disease, and American Society of Anesthesia class 4 anesthesia risk. Consistent with CMS guidelines, patient meets for inpatient status. It is our recommendation that this patient's hospitalization status should be INPATIENT status.      The final decision regarding the patient's hospitalization status depends on the attending physician's judgement.     Lima Gore MD, ARI,   Physician [place].

## 2018-06-19 NOTE — PROGRESS NOTES
Problem: Falls - Risk of  Goal: *Absence of Falls  Document Farhan Fall Risk and appropriate interventions in the flowsheet.    Outcome: Progressing Towards Goal  Fall Risk Interventions:  Mobility Interventions: Bed/chair exit alarm, Communicate number of staff needed for ambulation/transfer, Patient to call before getting OOB    Mentation Interventions: Bed/chair exit alarm, Door open when patient unattended, Evaluate medications/consider consulting pharmacy, Eyeglasses and hearing aids, Familiar objects from home, Increase mobility, Room close to nurse's station, Toileting rounds, Update white board    Medication Interventions: Bed/chair exit alarm, Evaluate medications/consider consulting pharmacy, Patient to call before getting OOB, Teach patient to arise slowly    Elimination Interventions: Bed/chair exit alarm, Call light in reach, Elevated toilet seat, Patient to call for help with toileting needs, Toilet paper/wipes in reach, Toileting schedule/hourly rounds    History of Falls Interventions: Bed/chair exit alarm, Consult care management for discharge planning, Door open when patient unattended, Evaluate medications/consider consulting pharmacy, Investigate reason for fall, Room close to nurse's station

## 2018-06-19 NOTE — PROGRESS NOTES
06/1950   Dual Skin Pressure Injury Assessment   Dual Skin Pressure Injury Assessment X   Second Care Provider (Based on [address] [place] [address]) Linda CUETO   Skin Integumentary   Skin Integumentary (WDL) X   Skin Color Appropriate for ethnicity   Skin Condition/Temp Dry; Warm   Skin Integrity Wound (add Wound LDA); Incision (comment)  (Right thigh)   Hair Growth Present   Varicosities Absent   Wound Prevention and Protection Methods   Orientation of Wound Prevention Posterior   Location of Wound Prevention Sacrum/Coccyx; Heel;Buttocks   Dressing Present  No   Wound Offloading (Prevention Methods) Bed, pressure redistribution/air;Bed, pressure reduction mattress;Pillows;Repositioning;Turning   Wound to right thigh with Wound vac in place, scabbed area also on right thigh.

## 2018-06-19 NOTE — PROGRESS NOTES
Spiritual Care Visit. Initial visit. Patient was visited by [facility]. Entered by Bethany Wilkinson [facility], Staff .  Tara, Patricia

## 2018-06-20 LAB
MM INDURATION POC: 0 MM (ref 0–5)
PPD POC: NEGATIVE NEGATIVE

## 2018-06-20 PROCEDURE — 74011250637 HC RX REV CODE- 250/637: Performed by: PHYSICIAN ASSISTANT

## 2018-06-20 PROCEDURE — 97606 NEG PRS WND THER DME>50 SQCM: CPT

## 2018-06-20 PROCEDURE — 74011250637 HC RX REV CODE- 250/637: Performed by: SURGERY

## 2018-06-20 PROCEDURE — 97110 THERAPEUTIC EXERCISES: CPT

## 2018-06-20 PROCEDURE — 77030019934 HC DRSG VAC ASST KCON -B

## 2018-06-20 PROCEDURE — 65270000029 HC RM PRIVATE

## 2018-06-20 PROCEDURE — 74750000023 HC WOUND THERAPY

## 2018-06-20 PROCEDURE — 74011250636 HC RX REV CODE- 250/636: Performed by: SURGERY

## 2018-06-20 PROCEDURE — 74011250637 HC RX REV CODE- 250/637: Performed by: INTERNAL MEDICINE

## 2018-06-20 PROCEDURE — 97161 PT EVAL LOW COMPLEX 20 MIN: CPT

## 2018-06-20 RX ADMIN — HEPARIN SODIUM 5000 UNITS: 5000 INJECTION, SOLUTION INTRAVENOUS; SUBCUTANEOUS at 17:07

## 2018-06-20 RX ADMIN — OXYCODONE HYDROCHLORIDE AND ACETAMINOPHEN 1 TABLET: 5; 325 TABLET ORAL at 05:44

## 2018-06-20 RX ADMIN — HEPARIN SODIUM 5000 UNITS: 5000 INJECTION, SOLUTION INTRAVENOUS; SUBCUTANEOUS at 08:05

## 2018-06-20 RX ADMIN — OXYCODONE HYDROCHLORIDE AND ACETAMINOPHEN 1 TABLET: 5; 325 TABLET ORAL at 10:47

## 2018-06-20 RX ADMIN — GABAPENTIN 100 MG: 100 CAPSULE ORAL at 08:05

## 2018-06-20 RX ADMIN — ASPIRIN 81 MG: 81 TABLET, COATED ORAL at 08:05

## 2018-06-20 RX ADMIN — GABAPENTIN 100 MG: 100 CAPSULE ORAL at 17:07

## 2018-06-20 RX ADMIN — OXYCODONE HYDROCHLORIDE AND ACETAMINOPHEN 1 TABLET: 5; 325 TABLET ORAL at 22:21

## 2018-06-20 RX ADMIN — CINACALCET HYDROCHLORIDE 30 MG: 30 TABLET, COATED ORAL at 22:21

## 2018-06-20 RX ADMIN — HEPARIN SODIUM 5000 UNITS: 5000 INJECTION, SOLUTION INTRAVENOUS; SUBCUTANEOUS at 00:18

## 2018-06-20 RX ADMIN — OXYCODONE HYDROCHLORIDE AND ACETAMINOPHEN 1 TABLET: 5; 325 TABLET ORAL at 00:18

## 2018-06-20 RX ADMIN — AMOXICILLIN AND CLAVULANATE POTASSIUM 1 TABLET: 500; 125 TABLET, FILM COATED ORAL at 17:07

## 2018-06-20 RX ADMIN — OXYCODONE HYDROCHLORIDE AND ACETAMINOPHEN 1 TABLET: 5; 325 TABLET ORAL at 17:07

## 2018-06-20 NOTE — PROGRESS NOTES
Progress Note    Patient: Merissa Marcial MRN: [id number]  SSN: [id number]    YOB: 1950  Age: 70 y.o. Sex: female      Admission Date: 6/18/2018    LOS: 2 days     2 Days Post-Op    PROCEDURE(S):  Debridement of right thigh wound, removal of AV graft, placement of wound vac    Subjective:     Patient initially seen with [address] [place], [place] [address]Jeremy during wound vac change then seen shortly thereafter with Dr. Katie Allison. Clinical Pharmacist recommended daily Augmentin 500mg dosing due to decreased renal function. Objective:     Vitals:    06/20/18 0539 06/20/18 0755 06/20/18 0803 06/20/18 1227   BP: 113/72 94/61  112/72   Pulse: 81 79  84   Resp:  17  16   Temp:  97.6 °F (36.4 °C)  97.8 °F (36.6 °C)   SpO2:  94%  96%   Weight:   194 lb (88 kg)    Height:   4' 11\" (1.499 m)         Physical Exam:   GENERAL: alert, cooperative, no distress  LUNG: normal respiratory effort  EXTREMITIES: right proximal thigh incision is deep but appears viable with red, moist walls and base, skin surrounding mildly indurated; pedal pulses not palpated but feet warm bilaterally    Lab/Data Review: All lab results for the last 24 hours reviewed. Assessment / Plan:     Principal Problem:    Infection of AV graft for dialysis ([address] (6/18/2018)    Active Problems:    ESRD on dialysis [facility]) (6/16/2010)        Continue wound vac  ABX  Awaiting PT / OT evaluation  PPD placed 6/19/2018  Dispo:  has offered SNF options      Signed By: John Valdes PA-C    June 20, 2018      Physician Assistant with Vascular Surgery Deo Melo MD / Iglesia Hernandez.  Selena Almaraz MD / Mandy Tellez MD

## 2018-06-20 NOTE — PROGRESS NOTES
CM met with patient at bedside and provided list of SNF options for her to review. Patient agreed to provide CM with choices for STR. Still awaiting response from [facility].

## 2018-06-20 NOTE — PROGRESS NOTES
Problem: Falls - Risk of  Goal: *Absence of Falls  Document Farhan Fall Risk and appropriate interventions in the flowsheet. Outcome: Progressing Towards Goal  Fall Risk Interventions:  Mobility Interventions: Bed/chair exit alarm, Patient to call before getting OOB    Mentation Interventions: Bed/chair exit alarm, Adequate sleep, hydration, pain control    Medication Interventions: Bed/chair exit alarm, Patient to call before getting OOB    Elimination Interventions: Bed/chair exit alarm, Call light in reach, Patient to call for help with toileting needs    History of Falls Interventions: Bed/chair exit alarm        Problem: Pressure Injury - Risk of  Goal: *Prevention of pressure injury  Document James Scale and appropriate interventions in the flowsheet.    Outcome: Progressing Towards Goal  Pressure Injury Interventions:  Sensory Interventions: Assess changes in LOC    Moisture Interventions: Absorbent underpads    Activity Interventions: Increase time out of bed, Pressure redistribution bed/mattress(bed type), PT/OT evaluation    Mobility Interventions: HOB 30 degrees or less, Pressure redistribution bed/mattress (bed type), PT/OT evaluation    Nutrition Interventions: Document food/fluid/supplement intake    Friction and Shear Interventions: HOB 30 degrees or less

## 2018-06-20 NOTE — PROGRESS NOTES
Problem: Mobility Impaired (Adult and Pediatric)  Goal: *Acute Goals and Plan of Care (Insert Text)  LTG:  (1.)Ms. Shanae Lee will move from supine to sit and sit to supine , scoot up and down and roll side to side in bed with MODIFIED INDEPENDENCE within 7 treatment day(s). (2.)Ms. Shanae Lee will transfer from bed to chair and chair to bed with SUPERVISION using the least restrictive device within 7 treatment day(s). (3.)Ms. Shanae Lee will ambulate with SUPERVISION for 75 feet with the least restrictive device within 7 treatment day(s). (4.)Ms. Shanae Lee will participate in therapeutic activity/exerices x 23 minutes for increased strength within 7 days. ________________________________________________________________________________________________      PHYSICAL THERAPY: Initial Assessment, Treatment Day: Day of Assessment, PM 6/20/2018  INPATIENT: Hospital Day: 3  [place] / Product Type: Medicare /      NAME/AGE/GENDER: Anirudh De Anda is a 70 y.o. female   PRIMARY DIAGNOSIS: NECROTIC RIGHT THIGH WOUND  Infection of AV graft for dialysis ([address] Infection of AV graft for dialysis ([address] Infection of AV graft for dialysis ([address]  Procedure(s) (LRB):  DEBRIDEMENT OF RIGHT THIGH WOUND/ REMOVAL OF AV GRAFT (Right)  2 Days Post-Op  ICD-10: Treatment Diagnosis:    · Generalized Muscle Weakness (M62.81)  · Other abnormalities of gait and mobility (R26.89)   Precaution/Allergies:  Vancomycin      ASSESSMENT:     Ms. Shanae Lee is a 70 y.o. female in the hospital for the above who was supine in the bed upon arrival.  Pt reports that she lives in a one story house with her Liss Loyola" (Marine [place] witness) that has 5 steps to enter. Pt also reported that PTA she was independent with ADLs and ambulated with tri-pod cane. [facility] and decreased strength in B LEs. During evaluation pt performed bed mobility with Radha with good sitting balance.   She stood with RW/Radha and has good to fair standing balance. Pt ambulated to bedside chair with decreased gait speed and step length. Pt ambulated on room air with post activity SpO2 of 88% so nasal cannula re-applied. Ms. Jesslyn Primrose could benefit from skilled PT as she is currently functioning below her baseline. This section established at most recent assessment   PROBLEM LIST (Impairments causing functional limitations):  1. Decreased Strength  2. Decreased Transfer Abilities  3. Decreased Ambulation Ability/Technique  4. Decreased Balance  5. Decreased Activity Tolerance   INTERVENTIONS PLANNED: (Benefits and precautions of physical therapy have been discussed with the patient.)  1. Balance Exercise  2. Bed Mobility  3. Family Education  4. Gait Training  5. Therapeutic Activites  6. Therapeutic Exercise/Strengthening  7. Transfer Training  8. Group Therapy     TREATMENT PLAN: Frequency/Duration: 3 times a week for duration of hospital stay  Rehabilitation Potential For Stated Goals: Good     RECOMMENDED REHABILITATION/EQUIPMENT: (at time of discharge pending progress): Due to the probability of continued deficits (see above) this patient will likely need continued skilled physical therapy after discharge.   Equipment:    None at this time              HISTORY:   History of Present Injury/Illness (Reason for Referral):  Per chart: \"Review of Systems / Medical History  Patient summary reviewed and pertinent labs reviewed     Pulmonary  COPD: mild Neuro/Psych   Within defined limits Cardiovascular  Hypertension: well controlled  CHF: NYHA Classification III  CAD  Exercise tolerance: >4 METS  Comments: TAVR 4-2018   GI/Hepatic/Renal     GERD: well controlled  Hepatitis: type C  Renal disease: ESRD and dialysis Endo/Other  Morbid obesity Other Findings             Physical Exam     Airway  Mallampati: II  TM Distance: 4 - 6 cm  Neck ROM: normal range of motion   Mouth opening: Normal Cardiovascular  Regular rate and rhythm,  S1 and S2 normal,  no murmur, click, rub, or gallop Dental   Pulmonary  Breath sounds clear to auscultation Abdominal  GI exam deferred Other Findings       \"  Past Medical History/Comorbidities:   Ms. Gina Haddad  has a past medical history of Chronic kidney disease; Dialysis patient [facility]); GERD (gastroesophageal reflux disease); Hyperlipidemia; Hypertension; Infection of AV graft for dialysis ([facility] (6/18/2018); Kidney failure; Liver disease; Obesity; Other ill-defined conditions(799.89); Peripheral neuropathy; and Psychotic disorder. She also has no past medical history of Adverse effect of anesthesia; Arthritis; Autoimmune disease ([facility] DEMENTIA; Difficult intubation; Endocrine disease; Gastrointestinal disorder; Heart failure ([facility] Malignant hyperthermia due to anesthesia; Nausea & vomiting; Neurological disorder; Pseudocholinesterase deficiency; Psychiatric disorder; PUD (peptic ulcer disease); Seizures ([facility] Sleep disorder; Stroke [facility]); or Thromboembolus ([facility] Ms. Gina Haddad  has a past surgical history that includes hx urological; hx other surgical; and vascular surgery procedure unlist (Right, 05/29/2018). Social History/Living Environment:   Home Environment: Private residence  # Steps to Enter: 5  Rails to Enter: Yes  Hand Rails : Left  One/Two Story Residence: One story  Interior Rails: Both  Living Alone: No  Support Systems: Friends \ neighbors  Patient Expects to be Discharged to[de-identified] Unknown  Current DME Used/Available at Home: Cane, straight (tripod)  Tub or Shower Type: Tub/Shower combination  Prior Level of Function/Work/Activity:  Lives in a one story house with friend and was independent with ADLs PTA. She also reported use of cane for ambulation PTA.      Number of Personal Factors/Comorbidities that affect the Plan of Care: 3+: HIGH COMPLEXITY   EXAMINATION:   Most Recent Physical Functioning:   Gross Assessment:  AROM: Within functional limits  Strength: Generally decreased, functional (B hip flexion 4-)               Posture:     Balance:  Sitting: Intact  Standing: Impaired  Standing - Static: Good  Standing - Dynamic : Fair Bed Mobility:  Supine to Sit: Minimum assistance  Scooting: Minimum assistance  Wheelchair Mobility:     Transfers:  Sit to Stand: Minimum assistance;Contact guard assistance  Stand to Sit: Contact guard assistance  Bed to Chair: Contact guard assistance;Minimum assistance  Gait:     Speed/Jaylin: Slow;Shuffled  Step Length: Left shortened;Right shortened  Gait Abnormalities: Decreased step clearance;Shuffling gait  Distance (ft): 8 Feet (ft)  Assistive Device: Walker, rolling  Ambulation - Level of Assistance: Contact guard assistance      Body Structures Involved:  1. Metabolic  2. Endocrine  3. Muscles Body Functions Affected:  1. Neuromusculoskeletal  2. Movement Related  3. Metobolic/Endocrine Activities and Participation Affected:  1. Mobility  2. Domestic Life  3. Community, Social and [place]   Number of elements that affect the Plan of Care: 4+: HIGH COMPLEXITY   CLINICAL PRESENTATION:   Presentation: Stable and uncomplicated: LOW COMPLEXITY   CLINICAL DECISION MAKIN [facility] Inpatient Short Form  How much difficulty does the patient currently have. .. Unable A Lot A Little None   1. Turning over in bed (including adjusting bedclothes, sheets and blankets)? [] 1   [] 2   [] 3   [x] 4   2. Sitting down on and standing up from a chair with arms ( e.g., wheelchair, bedside commode, etc.)   [] 1   [] 2   [x] 3   [] 4   3. Moving from lying on back to sitting on the side of the bed? [] 1   [] 2   [x] 3   [] 4   How much help from another person does the patient currently need. .. Total A Lot A Little None   4. Moving to and from a bed to a chair (including a wheelchair)? [] 1   [] 2   [x] 3   [] 4   5. Need to walk in hospital room? [] 1   [] 2   [x] 3   [] 4   6. Climbing 3-5 steps with a railing?    [] 1 [] 2   [x] 3   [] 4   © 2007, Trustees of [address] [place] [address] [address] [address], under license to [employer]. All rights reserved      Score:  Initial: 19 Most Recent: X (Date: -- )    Interpretation of Tool:  Represents activities that are increasingly more difficult (i.e. Bed mobility, Transfers, Gait). Score 24 23 22-20 19-15 14-10 9-7 6     Modifier CH CI CJ CK CL CM CN      ? Mobility - Walking and Moving Around:     - CURRENT STATUS: CK - 40%-59% impaired, limited or restricted    - GOAL STATUS: CJ - 20%-39% impaired, limited or restricted    - D/C STATUS:  ---------------To be determined---------------  [place] / [place] / Product Type: Medicare /      Medical Necessity:     · Patient demonstrates good rehab potential due to higher previous functional level. Reason for Services/Other Comments:  · Patient continues to require skilled intervention due to decreased balance and ambulation. Use of outcome tool(s) and clinical judgement create a POC that gives a: Clear prediction of patient's progress: LOW COMPLEXITY            TREATMENT:   (In addition to Assessment/Re-Assessment sessions the following treatments were rendered)   Pre-treatment Symptoms/Complaints:  None  Pain: Initial:   Pain Intensity 1: 0  Post Session:  2/10      Therapeutic Exercise: (8 Minutes):  Exercises per grid below to improve mobility, strength and activity tolerance. Required minimal visual and verbal cues to promote proper body alignment, promote proper body mechanics and promote proper body breathing techniques. Progressed repetitions as indicated.        Date:  6/20/18 Date:   Date:     ACTIVITY/EXERCISE AM PM AM PM AM PM   Seated LAQ  2 x 20 B       Seated marching  2 x 20 B       Seated AP  2 x 20 B       Seated hip abd/add  2 x 20 B                                  B = bilateral; AA = active assistive; A = active; P = passive          Braces/Orthotics/Lines/Etc:   · Wound vac  · O2 Device: Nasal cannula  Treatment/Session Assessment:    · Response to Treatment:  Tolerated fairly given decreased activity tolerance. · Interdisciplinary Collaboration:   o Physical Therapist  o Registered Nurse  o Certified Nursing Assistant/Patient Care Technician  · After treatment position/precautions:   o Up in chair  o Bed alarm/tab alert on  o Bed/Chair-wheels locked  o Call light within reach  o Visitors at bedside   · Compliance with Program/Exercises: Will assess as treatment progresses. · Recommendations/Intent for next treatment session: \"Next visit will focus on advancements to more challenging activities and reduction in assistance provided\".   Total Treatment Duration:  PT Patient Time In/Time Out  Time In: 1320  Time Out: [place], PT, DPT

## 2018-06-20 NOTE — WOUND CARE
Patient seen with [facility] PA for vascular surgeon. Wound VAC dressing changed. Wound viable red and measured 14.0x4.0x3.5 cm. Patient tolerated dressing well. Ludy wound skin still has some hardness to it but decreased in tenderness per patient. Recommend continued VAC at rehab as this wound is deep. May be able to switch dressing types in a few weeks. Discussed wound status with patient, answered her questions. Wound team will assist and monitor while she remains in acute care.

## 2018-06-20 NOTE — PROGRESS NOTES
Cami Oquendo  Admission Date: 6/18/2018             Daily Progress Note: 6/20/2018    The patient's chart is reviewed and the patient is discussed with the staff. Subjective:     Ms. Soraya Perez reports feeling well today, denies SOB, CP, N/V/D. Reports minimal right thigh pain. Last HD yesterday.      ROS  Gen - no fever, no chills  CV - no chest pain, no palpitation  Lung - no shortness of breath, no cough  Abd - no tenderness, no nausea/vomiting, no diarrhea  Ext - no edema    Current Facility-Administered Medications   Medication Dose Route Frequency    gabapentin (NEURONTIN) capsule 100 mg  100 mg Oral BID    heparin (porcine) 1,000 unit/mL injection 5,000 Units  5,000 Units Hemodialysis DIALYSIS PRN    tuberculin injection 5 Units  5 Units IntraDERMal ONCE    amoxicillin-clavulanate (AUGMENTIN) 500-125 mg per tablet 1 Tab  1 Tab Oral Q24H    albuterol (PROVENTIL HFA, VENTOLIN HFA, PROAIR HFA) inhaler 1 Puff  1 Puff Inhalation Q6H PRN    ALPRAZolam (XANAX) tablet 0.5 mg  0.5 mg Oral TID PRN    aspirin delayed-release tablet 81 mg  81 mg Oral DAILY    cinacalcet (SENSIPAR) tablet 30 mg  30 mg Oral QHS    HYDROmorphone (DILAUDID) syringe 0.5 mg  0.5 mg IntraVENous Q5MIN PRN    oxyCODONE-acetaminophen (PERCOCET) 5-325 mg per tablet 1 Tab  1 Tab Oral Q4H PRN    morphine injection 2 mg  2 mg IntraVENous Q2H PRN    heparin (porcine) injection 5,000 Units  5,000 Units SubCUTAneous Q8H         Objective:     Vitals:    06/20/18 0425 06/20/18 0539 06/20/18 0755 06/20/18 0803   BP: 99/53 113/72 94/61    Pulse: 91 81 79    Resp: 18  17    Temp: 99 °F (37.2 °C)  97.6 °F (36.4 °C)    SpO2: 92%  94%    Weight:    88 kg (194 lb)   Height:    4' 11\" (1.499 m)     Intake and Output:   06/18 1901 - 06/20 0700  In: -   Out: 3000        Physical Exam:   Constitutional: Alert and oriented X4, the patient is well developed and in no acute distress  HEENT:  Sclera clear, pupils equal, oral mucosa moist  Lungs: Clear bilaterally  Cardiovascular:  RRR without Rub, murmur  Abd/GI: soft and non-tender; with positive bowel sounds. Ext: warm without cyanosis. There is no lower leg edema. Musculoskeletal: moves all four extremities with equal strength  Skin:  no jaundice or rashes, right thigh wound with wound vac  Neuro: no gross neuro deficits   Musculoskeletal: can ambulate. No deformity  Psychiatric: Calm. Access- Right TCC intact no signs and symptoms of infection      LAB  Recent Labs      06/19/18   1804   WBC  6.3   HGB  9.5*   HCT  29.9*   PLT  218     Recent Labs      06/19/18   1804  06/18/18   1309   K   --   5.7*   PHOS  4.5*   --      No results for input(s): PH, PCO2, PO2, HCO3 in the last 72 hours. Assessment:  (Medical Decision Making)     Hospital Problems  Date Reviewed: 10/12/2017          Codes Class Noted POA    * (Principal)Infection of AV graft for dialysis ([facility] ICD-10-CM: T82. 7XXA  ICD-9-CM: 996.62  6/18/2018 Yes        ESRD on dialysis [facility]) (Chronic) ICD-10-CM: N18.6, Z99.2  ICD-9-CM: 585.6, V45.11  6/16/2010 Yes              Plan:  (Medical Decision Making)    1. ESRD-  next HD tomorrow labs ordered prior to dialysis, will need to transition back to MWF schedule  - outpatient unit- [facility] M/W/F    2. AVG infection/abcess- s/p removal of right thigh AVG with wound debridement 6/19- now with wound vac. - on Augmentin    3.  Anemia- Caodaism    Lissett Guzman NP

## 2018-06-20 NOTE — PROGRESS NOTES
Problem: Falls - Risk of  Goal: *Absence of Falls  Document Farhan Fall Risk and appropriate interventions in the flowsheet.    Outcome: Progressing Towards Goal  Fall Risk Interventions:  Mobility Interventions: Bed/chair exit alarm, Communicate number of staff needed for ambulation/transfer, OT consult for ADLs, Patient to call before getting OOB, PT Consult for mobility concerns, PT Consult for assist device competence    Mentation Interventions: Bed/chair exit alarm, Door open when patient unattended, Evaluate medications/consider consulting pharmacy, Eyeglasses and hearing aids, Familiar objects from home, Family/sitter at bedside, Increase mobility, Room close to nurse's station, Toileting rounds, Update white board    Medication Interventions: Bed/chair exit alarm, Evaluate medications/consider consulting pharmacy, Patient to call before getting OOB, Teach patient to arise slowly    Elimination Interventions: Bed/chair exit alarm, Call light in reach, Elevated toilet seat, Patient to call for help with toileting needs, Toilet paper/wipes in reach, Toileting schedule/hourly rounds    History of Falls Interventions: Bed/chair exit alarm, Consult care management for discharge planning, Door open when patient unattended, Evaluate medications/consider consulting pharmacy, Investigate reason for fall, Room close to nurse's station

## 2018-06-21 LAB
ANION GAP SERPL CALC-SCNC: 14 MMOL/L (ref 7–16)
BASOPHILS # BLD: 0 K/UL (ref 0–0.2)
BASOPHILS NFR BLD: 0 % (ref 0–2)
BUN SERPL-MCNC: 35 MG/DL (ref 8–23)
CALCIUM SERPL-MCNC: 8.4 MG/DL (ref 8.3–10.4)
CHLORIDE SERPL-SCNC: 104 MMOL/L (ref 98–107)
CO2 SERPL-SCNC: 24 MMOL/L (ref 21–32)
CREAT SERPL-MCNC: 10.4 MG/DL (ref 0.6–1)
DIFFERENTIAL METHOD BLD: ABNORMAL
EOSINOPHIL # BLD: 0.4 K/UL (ref 0–0.8)
EOSINOPHIL NFR BLD: 9 % (ref 0.5–7.8)
ERYTHROCYTE [DISTWIDTH] IN BLOOD BY AUTOMATED COUNT: 15 % (ref 11.9–14.6)
GLUCOSE SERPL-MCNC: 78 MG/DL (ref 65–100)
HCT VFR BLD AUTO: 27.5 % (ref 35.8–46.3)
HGB BLD-MCNC: 8.5 G/DL (ref 11.7–15.4)
IMM GRANULOCYTES # BLD: 0 K/UL (ref 0–0.5)
IMM GRANULOCYTES NFR BLD AUTO: 1 % (ref 0–5)
LYMPHOCYTES # BLD: 1.2 K/UL (ref 0.5–4.6)
LYMPHOCYTES NFR BLD: 25 % (ref 13–44)
MCH RBC QN AUTO: 26.6 PG (ref 26.1–32.9)
MCHC RBC AUTO-ENTMCNC: 30.9 G/DL (ref 31.4–35)
MCV RBC AUTO: 86.2 FL (ref 79.6–97.8)
MM INDURATION POC: 0 MM (ref 0–5)
MONOCYTES # BLD: 0.4 K/UL (ref 0.1–1.3)
MONOCYTES NFR BLD: 9 % (ref 4–12)
NEUTS SEG # BLD: 2.6 K/UL (ref 1.7–8.2)
NEUTS SEG NFR BLD: 56 % (ref 43–78)
PLATELET # BLD AUTO: 207 K/UL (ref 150–450)
PMV BLD AUTO: 9.3 FL (ref 10.8–14.1)
POTASSIUM SERPL-SCNC: 5 MMOL/L (ref 3.5–5.1)
PPD POC: NEGATIVE NEGATIVE
RBC # BLD AUTO: 3.19 M/UL (ref 4.05–5.25)
SODIUM SERPL-SCNC: 142 MMOL/L (ref 136–145)
WBC # BLD AUTO: 4.7 K/UL (ref 4.3–11.1)

## 2018-06-21 PROCEDURE — 74011250637 HC RX REV CODE- 250/637: Performed by: PHYSICIAN ASSISTANT

## 2018-06-21 PROCEDURE — 65270000029 HC RM PRIVATE

## 2018-06-21 PROCEDURE — 80048 BASIC METABOLIC PNL TOTAL CA: CPT | Performed by: NURSE PRACTITIONER

## 2018-06-21 PROCEDURE — 74011250637 HC RX REV CODE- 250/637: Performed by: SURGERY

## 2018-06-21 PROCEDURE — 74011250636 HC RX REV CODE- 250/636: Performed by: SURGERY

## 2018-06-21 PROCEDURE — 36415 COLL VENOUS BLD VENIPUNCTURE: CPT | Performed by: NURSE PRACTITIONER

## 2018-06-21 PROCEDURE — 97165 OT EVAL LOW COMPLEX 30 MIN: CPT

## 2018-06-21 PROCEDURE — 5A1D70Z PERFORMANCE OF URINARY FILTRATION, INTERMITTENT, LESS THAN 6 HOURS PER DAY: ICD-10-PCS | Performed by: INTERNAL MEDICINE

## 2018-06-21 PROCEDURE — 97166 OT EVAL MOD COMPLEX 45 MIN: CPT

## 2018-06-21 PROCEDURE — 85025 COMPLETE CBC W/AUTO DIFF WBC: CPT | Performed by: NURSE PRACTITIONER

## 2018-06-21 PROCEDURE — 90935 HEMODIALYSIS ONE EVALUATION: CPT

## 2018-06-21 PROCEDURE — 74011250636 HC RX REV CODE- 250/636: Performed by: INTERNAL MEDICINE

## 2018-06-21 PROCEDURE — 97530 THERAPEUTIC ACTIVITIES: CPT

## 2018-06-21 PROCEDURE — 74011250637 HC RX REV CODE- 250/637: Performed by: INTERNAL MEDICINE

## 2018-06-21 PROCEDURE — 74750000023 HC WOUND THERAPY

## 2018-06-21 PROCEDURE — 97110 THERAPEUTIC EXERCISES: CPT

## 2018-06-21 RX ADMIN — HEPARIN SODIUM 5000 UNITS: 5000 INJECTION, SOLUTION INTRAVENOUS; SUBCUTANEOUS at 17:16

## 2018-06-21 RX ADMIN — AMOXICILLIN AND CLAVULANATE POTASSIUM 1 TABLET: 500; 125 TABLET, FILM COATED ORAL at 17:18

## 2018-06-21 RX ADMIN — ASPIRIN 81 MG: 81 TABLET, COATED ORAL at 11:35

## 2018-06-21 RX ADMIN — HEPARIN SODIUM 5000 UNITS: 5000 INJECTION, SOLUTION INTRAVENOUS; SUBCUTANEOUS at 11:35

## 2018-06-21 RX ADMIN — HEPARIN SODIUM 5000 UNITS: 5000 INJECTION, SOLUTION INTRAVENOUS; SUBCUTANEOUS at 01:22

## 2018-06-21 RX ADMIN — HEPARIN SODIUM 5000 UNITS: 1000 INJECTION, SOLUTION INTRAVENOUS; SUBCUTANEOUS at 07:23

## 2018-06-21 RX ADMIN — OXYCODONE HYDROCHLORIDE AND ACETAMINOPHEN 1 TABLET: 5; 325 TABLET ORAL at 05:50

## 2018-06-21 RX ADMIN — CINACALCET HYDROCHLORIDE 30 MG: 30 TABLET, COATED ORAL at 21:44

## 2018-06-21 RX ADMIN — GABAPENTIN 100 MG: 100 CAPSULE ORAL at 11:35

## 2018-06-21 RX ADMIN — OXYCODONE HYDROCHLORIDE AND ACETAMINOPHEN 1 TABLET: 5; 325 TABLET ORAL at 17:16

## 2018-06-21 RX ADMIN — GABAPENTIN 100 MG: 100 CAPSULE ORAL at 17:16

## 2018-06-21 NOTE — PROGRESS NOTES
Problem: Self Care Deficits Care Plan (Adult)  Goal: *Acute Goals and Plan of Care (Insert Text)  1. Patient will complete lower body bathing and dressing with minimal assistance and adaptive equipment as needed. 2. Patient will complete toileting with minimal assistance. 3. Patient will tolerate 25 minutes of OT treatment with 3 rest breaks to increase activity tolerance for ADLs. 4. Patient will complete functional transfers with supervision and adaptive equipment as needed. Timeframe: 7 visits     OCCUPATIONAL THERAPY: Initial Assessment and PM 6/21/2018  INPATIENT: Hospital Day: 4  [place] / Product Type: Medicare /      NAME/AGE/GENDER: Kandy Fabry is a 70 y.o. female   PRIMARY DIAGNOSIS:  NECROTIC RIGHT THIGH WOUND  Infection of AV graft for dialysis ([address] Infection of AV graft for dialysis ([address] Infection of AV graft for dialysis ([address]  Procedure(s) (LRB):  DEBRIDEMENT OF RIGHT THIGH WOUND/ REMOVAL OF AV GRAFT (Right)  3 Days Post-Op  ICD-10: Treatment Diagnosis:    · Generalized Muscle Weakness (M62.81)  · Other lack of cordination (R27.8)   Precautions/Allergies:    Fall Risk Vancomycin      ASSESSMENT:     Ms. Claudy Pennintgon presents seated in recliner chair, note lunch tray untouched and lights off in hospital room, appears lethargic and with flat affect/minimal engagement. Note patient completed HD earlier this AM and is fatigued. Patient ultimately agreeable to brief OT evaluation. Patient on 3L 02 via nasal cannula and with wound vac to R thigh. Patient reports that she lives in a one story home with her \"roommate,\" and that [facility] long term care aides were coming into the home to assist the patient and her roommate with IADL, and upon further prompting, assist with ADL PRN.   Patient reports she has been on dialysis for many years and does not drive, however was generally modified independent with ADL and functional mobility ambulating with tripod cane PTA. Patient reports managing her own medications PTA. Patient reports no trouble with her hearing or vision, wears reading glasses. Patient participated in UE assessment seated back in recliner, BUE AROM is WFL. BUE strength is generally decreased but functional, approximately 4-/5 bilaterally. Patient with arthritis affecting B hands, with deformities to R digits 2-3 and reporting numbness bilaterally. Patient reports difficulty with [facility] d/t deficits. Patient prompted and able to doff and don L sock this session seated back in recliner with increased time (bending up LLE at hip/knee), reports she is unable to manage R sock d/t RLE wound. Patient was left set up with lunch tray at tray table seated in recliner, call bell within reach. Recommending further skilled OT services to address AE/ADL training, functional [facility] skills, and UE endurance. This section established at most recent assessment   PROBLEM LIST (Impairments causing functional limitations):  1. Decreased Strength  2. Decreased ADL/Functional Activities  3. Decreased Transfer Abilities  4. Decreased Ambulation Ability/Technique  5. Decreased Balance  6. Decreased Activity Tolerance  7. Decreased Skin Integrity/Hygeine  8. Decreased Cognition   INTERVENTIONS PLANNED: (Benefits and precautions of occupational therapy have been discussed with the patient.)  1. Activities of daily living training  2. Adaptive equipment training  3. Balance training  4. Clothing management  5. Cognitive training  6. Donning&doffing training  7. Therapeutic activity     TREATMENT PLAN: Frequency/Duration: Follow patient 3x/week to address above goals. Rehabilitation Potential For Stated Goals: Fair     RECOMMENDED REHABILITATION/EQUIPMENT: (at time of discharge pending progress): Due to the probability of continued deficits (see above) this patient will likely need continued skilled occupational therapy after discharge.   Equipment:    Dressing Aides, Type: Reacher, Stocking Aid, Long Handled Shoe Horn and Elastic Shoe Laces              OCCUPATIONAL PROFILE AND HISTORY:   History of Present Injury/Illness (Reason for Referral):  See H&P  Past Medical History/Comorbidities:   Ms. Stanislav Hubbard  has a past medical history of Chronic kidney disease; Dialysis patient [facility]); GERD (gastroesophageal reflux disease); Hyperlipidemia; Hypertension; Infection of AV graft for dialysis ([address] (6/18/2018); Kidney failure; Liver disease; Obesity; Other ill-defined conditions(799.89); Peripheral neuropathy; and Psychotic disorder. She also has no past medical history of Adverse effect of anesthesia; Arthritis; Autoimmune disease ([address] DEMENTIA; Difficult intubation; Endocrine disease; Gastrointestinal disorder; Heart failure ([address] Malignant hyperthermia due to anesthesia; Nausea & vomiting; Neurological disorder; Pseudocholinesterase deficiency; Psychiatric disorder; PUD (peptic ulcer disease); Seizures ([address] Sleep disorder; Stroke [facility]); or Thromboembolus ([address] Ms. Stanislav Hubbard  has a past surgical history that includes hx urological; hx other surgical; and vascular surgery procedure unlist (Right, 05/29/2018). Social History/Living Environment:   Home Environment: Private residence  # Steps to Enter: 5  Rails to Enter: Yes  Hand Rails : Left  One/Two Story Residence: One story  Interior Rails: Both  Living Alone: No  Support Systems: Friends \ neighbors  Patient Expects to be Discharged to[de-identified] Unknown  Current DME Used/Available at Home: Cane, straight  Tub or Shower Type: Tub/Shower combination  Prior Level of Function/Work/Activity:  Patient reports that she lives in a one story home with her \"roommate,\" and that [facility] long term care aides were coming into the home to assist the patient and her roommate with IADL, and upon further prompting, assist with ADL PRN.   Patient reports she has been on dialysis for many years and does not drive, however was generally modified independent with ADL and functional mobility ambulating with tripod cane PTA. Patient reports managing her own medications PTA. Number of Personal Factors/Comorbidities that affect the Plan of Care: Expanded review of therapy/medical records (1-2):  MODERATE COMPLEXITY   ASSESSMENT OF OCCUPATIONAL PERFORMANCE[de-identified]   Activities of Daily Living:   Basic ADLs (From Assessment) Complex ADLs (From Assessment)   Feeding: Setup  Oral Facial Hygiene/Grooming: Setup  Bathing: Moderate assistance  Upper Body Dressing: Minimum assistance  Lower Body Dressing: Moderate assistance  Toileting: Maximum assistance Instrumental ADL  Meal Preparation: Total assistance  Homemaking:  Total assistance  Medication Management: Supervision  Financial Management: Supervision   Grooming/Bathing/Dressing Activities of Daily Living     Cognitive Retraining  Safety/Judgement: Fall prevention                       Bed/Mat Mobility  Supine to Sit: Minimum assistance  Sit to Stand: Contact guard assistance;Minimum assistance  Scooting: Minimum assistance       Most Recent Physical Functioning:   Gross Assessment:  AROM: Generally decreased, functional  Strength: Generally decreased, functional (Grossly 4-/5 BUE)               Posture:     Balance:  Sitting: Intact  Standing - Static: Good  Standing - Dynamic : Fair Bed Mobility:  Supine to Sit: Minimum assistance  Scooting: Minimum assistance  Wheelchair Mobility:     Transfers:  Sit to Stand: Contact guard assistance;Minimum assistance  Stand to Sit: Contact guard assistance            Patient Vitals for the past 6 hrs:   BP BP Patient Position SpO2 Pulse   06/21/18 0731 126/44 - - 76   06/21/18 0755 (!) 112/39 - - 71   06/21/18 0829 156/62 - - (!) 57   06/21/18 0852 118/68 - - 71   06/21/18 0928 121/56 - - 67   06/21/18 0958 119/46 - - 66   06/21/18 1011 129/55 - - (!) 49   06/21/18 1110 - Sitting 100 % 91       Mental Status  Neurologic State: Alert  Orientation Level: Disoriented to time, Oriented to person, Oriented to place, Oriented to situation (Disoriented to date, otherwise oriented)  Cognition: Follows commands, Decreased attention/concentration  Perception: Appears intact  Perseveration: No perseveration noted  Safety/Judgement: Fall prevention                          Physical Skills Involved:  1. Balance  2. Strength  3. Activity Tolerance  4. Sensation  5. Fine Motor Control  6. Skin Integrity Cognitive Skills Affected (resulting in the inability to perform in a timely and safe manner):  1. Long Term Memory  2. Sustained Attention Psychosocial Skills Affected:  1. Habits/Routines  2. Environmental Adaptation  3. Social Interaction  4. Emotional Regulation  5. Social Roles   Number of elements that affect the Plan of Care: 3-5:  MODERATE COMPLEXITY   CLINICAL DECISION MAKIN[address] [place] [address] AM-PAC 6 Clicks   Daily Activity Inpatient Short Form  How much help from another person does the patient currently need. .. Total A Lot A Little None   1. Putting on and taking off regular lower body clothing? [] 1   [x] 2   [] 3   [] 4   2. Bathing (including washing, rinsing, drying)? [] 1   [x] 2   [] 3   [] 4   3. Toileting, which includes using toilet, bedpan or urinal?   [] 1   [x] 2   [] 3   [] 4   4. Putting on and taking off regular upper body clothing? [] 1   [] 2   [x] 3   [] 4   5. Taking care of personal grooming such as brushing teeth? [] 1   [] 2   [x] 3   [] 4   6. Eating meals? [] 1   [] 2   [x] 3   [] 4   © , Trustees of [address] [place], [place] [address], under license to [employer]. All rights reserved      Score:  Initial: 15 Most Recent: X (Date: 18 )    Interpretation of Tool:  Represents activities that are increasingly more difficult (i.e. Bed mobility, Transfers, Gait). Score 24 23 22-20 19-15 14-10 9-7 6     Modifier CH CI CJ CK CL CM CN      ?  Self Care:     - CURRENT STATUS: CK - 40%-59% impaired, limited or restricted    - GOAL STATUS: CJ - 20%-39% impaired, limited or restricted    - D/C STATUS:  ---------------To be determined---------------  [place] / Product Type: Medicare /      Medical Necessity:     · Skilled intervention continues to be required due to decreased ADLs and functional mobility. Reason for Services/Other Comments:  · Patient continues to require skilled intervention due to above noted deficits. Use of outcome tool(s) and clinical judgement create a POC that gives a: MODERATE COMPLEXITY         TREATMENT:   (In addition to Assessment/Re-Assessment sessions the following treatments were rendered)     Pre-treatment Symptoms/Complaints:    Pain: Initial:   Pain Intensity 1: 0  Post Session:  None indicated     Assessment/Reassessment only, no treatment provided today    Braces/Orthotics/Lines/Etc:   · IV  · Wound Vac  · O2 Device: Nasal cannula  Treatment/Session Assessment:    · Response to Treatment:  Agreeable to brief OT evaluation, decreased engagement and fatigue  · Interdisciplinary Collaboration:   o Physical Therapist  o Registered Nurse  · After treatment position/precautions:   o Up in chair  o Bed alarm/tab alert on  o Bed/Chair-wheels locked  o Call light within reach   · Compliance with Program/Exercises: compliant most of the time. · Recommendations/Intent for next treatment session: \"Next visit will focus on advancements to more challenging activities and reduction in assistance provided\".   Total Treatment Duration:  OT Patient Time In/Time Out  Time In: 1301  Time Out: [address] [facility] Arbaham Yañez OT

## 2018-06-21 NOTE — PROGRESS NOTES
Problem: Mobility Impaired (Adult and Pediatric)  Goal: *Acute Goals and Plan of Care (Insert Text)  LTG:  (1.)Ms. Zabala Has will move from supine to sit and sit to supine , scoot up and down and roll side to side in bed with MODIFIED INDEPENDENCE within 7 treatment day(s). (2.)Ms. Zabala Has will transfer from bed to chair and chair to bed with SUPERVISION using the least restrictive device within 7 treatment day(s). (3.)Ms. Zabala Has will ambulate with SUPERVISION for 75 feet with the least restrictive device within 7 treatment day(s). (4.)Ms. Zabala Has will participate in therapeutic activity/exerices x 23 minutes for increased strength within 7 days. ________________________________________________________________________________________________      PHYSICAL THERAPY: Daily Note, Treatment Day: 1st, AM 6/21/2018  INPATIENT: Hospital Day: 4  [place] / Product Type: Medicare /      NAME/AGE/GENDER: Eladio Malagon is a 70 y.o. female   PRIMARY DIAGNOSIS: NECROTIC RIGHT THIGH WOUND  Infection of AV graft for dialysis ([address] Infection of AV graft for dialysis ([address] Infection of AV graft for dialysis ([address]  Procedure(s) (LRB):  DEBRIDEMENT OF RIGHT THIGH WOUND/ REMOVAL OF AV GRAFT (Right)  3 Days Post-Op  ICD-10: Treatment Diagnosis:    · Generalized Muscle Weakness (M62.81)  · Other abnormalities of gait and mobility (R26.89)   Precaution/Allergies:  Vancomycin      ASSESSMENT:     Ms. Zabala Has presents supine in bed and agreed to therapy, wound vac R thigh. Pt performed supine to sit with min assist and additional time. Pt stood and ambulated about 7' to chair using rolling walker and min/CGA. Pt does fatigue quickly. Pt was able to perform below LE exercises after she rested a few minutes. Pt did require verbal and manual cues to complete exercises thru full range. Pt was left up in chair with needs in reach and alarm on. Slow progress with overall mobility. Will continue with POC. This section established at most recent assessment   PROBLEM LIST (Impairments causing functional limitations):  1. Decreased Strength  2. Decreased Transfer Abilities  3. Decreased Ambulation Ability/Technique  4. Decreased Balance  5. Decreased Activity Tolerance   INTERVENTIONS PLANNED: (Benefits and precautions of physical therapy have been discussed with the patient.)  1. Balance Exercise  2. Bed Mobility  3. Family Education  4. Gait Training  5. Therapeutic Activites  6. Therapeutic Exercise/Strengthening  7. Transfer Training  8. Group Therapy     TREATMENT PLAN: Frequency/Duration: 3 times a week for duration of hospital stay  Rehabilitation Potential For Stated Goals: Good     RECOMMENDED REHABILITATION/EQUIPMENT: (at time of discharge pending progress): Due to the probability of continued deficits (see above) this patient will likely need continued skilled physical therapy after discharge.   Equipment:    None at this time              HISTORY:   History of Present Injury/Illness (Reason for Referral):  Per chart: \"Review of Systems / Medical History  Patient summary reviewed and pertinent labs reviewed     Pulmonary  COPD: mild Neuro/Psych   Within defined limits Cardiovascular  Hypertension: well controlled  CHF: NYHA Classification III  CAD  Exercise tolerance: >4 METS  Comments: TAVR 4-2018   GI/Hepatic/Renal     GERD: well controlled  Hepatitis: type C  Renal disease: ESRD and dialysis Endo/Other  Morbid obesity Other Findings             Physical Exam     Airway  Mallampati: II  TM Distance: 4 - 6 cm  Neck ROM: normal range of motion   Mouth opening: Normal Cardiovascular  Regular rate and rhythm,  S1 and S2 normal,  no murmur, click, rub, or gallop Dental   Pulmonary  Breath sounds clear to auscultation Abdominal  GI exam deferred Other Findings       \"  Past Medical History/Comorbidities:   Ms. Nikolay De Luna  has a past medical history of Chronic kidney disease; Dialysis patient [facility]); GERD (gastroesophageal reflux disease); Hyperlipidemia; Hypertension; Infection of [facility] graft for dialysis ([facility] (6/18/2018); Kidney failure; Liver disease; Obesity; Other ill-defined conditions(799.89); Peripheral neuropathy; and Psychotic disorder. She also has no past medical history of Adverse effect of anesthesia; Arthritis; Autoimmune disease ([facility] DEMENTIA; Difficult intubation; Endocrine disease; Gastrointestinal disorder; Heart failure ([facility] Malignant hyperthermia due to anesthesia; Nausea & vomiting; Neurological disorder; Pseudocholinesterase deficiency; Psychiatric disorder; PUD (peptic ulcer disease); Seizures ([facility] Sleep disorder; Stroke [facility]); or Thromboembolus ([facility] Ms. Roosevelt Leahy  has a past surgical history that includes hx urological; hx other surgical; and vascular surgery procedure unlist (Right, 05/29/2018). Social History/Living Environment:   Home Environment: Private residence  # Steps to Enter: 5  Rails to Enter: Yes  Hand Rails : Left  One/Two Story Residence: One story  Interior Rails: Both  Living Alone: No  Support Systems: Friends \ neighbors  Patient Expects to be Discharged to[de-identified] Unknown  Current DME Used/Available at Home: Cane, straight (tripod)  Tub or Shower Type: Tub/Shower combination  Prior Level of Function/Work/Activity:  Lives in a one story house with friend and was independent with ADLs PTA. She also reported use of cane for ambulation PTA.      Number of Personal Factors/Comorbidities that affect the Plan of Care: 3+: HIGH COMPLEXITY   EXAMINATION:   Most Recent Physical Functioning:   Gross Assessment:                  Posture:     Balance:  Sitting: Intact  Standing - Static: Good  Standing - Dynamic : Fair Bed Mobility:  Supine to Sit: Minimum assistance  Scooting: Minimum assistance  Wheelchair Mobility:     Transfers:  Sit to Stand: Contact guard assistance;Minimum assistance  Stand to Sit: Contact guard assistance  Gait:     Speed/Jaylin: Perri Yañez decreased (<100 feet/min); Shuffled  Step Length: Left shortened;Right shortened  Gait Abnormalities: Decreased step clearance;Shuffling gait;Trunk sway increased  Distance (ft): 7 Feet (ft)  Assistive Device: Walker, rolling  Ambulation - Level of Assistance: Contact guard assistance;Minimal assistance      Body Structures Involved:  1. Metabolic  2. Endocrine  3. Muscles Body Functions Affected:  1. Neuromusculoskeletal  2. Movement Related  3. Metobolic/Endocrine Activities and Participation Affected:  1. Mobility  2. Domestic Life  3. Community, Social and [place]   Number of elements that affect the Plan of Care: 4+: HIGH COMPLEXITY   CLINICAL PRESENTATION:   Presentation: Stable and uncomplicated: LOW COMPLEXITY   CLINICAL DECISION MAKIN [facility] Inpatient Short Form  How much difficulty does the patient currently have. .. Unable A Lot A Little None   1. Turning over in bed (including adjusting bedclothes, sheets and blankets)? [] 1   [] 2   [] 3   [x] 4   2. Sitting down on and standing up from a chair with arms ( e.g., wheelchair, bedside commode, etc.)   [] 1   [] 2   [x] 3   [] 4   3. Moving from lying on back to sitting on the side of the bed? [] 1   [] 2   [x] 3   [] 4   How much help from another person does the patient currently need. .. Total A Lot A Little None   4. Moving to and from a bed to a chair (including a wheelchair)? [] 1   [] 2   [x] 3   [] 4   5. Need to walk in hospital room? [] 1   [] 2   [x] 3   [] 4   6. Climbing 3-5 steps with a railing? [] 1   [] 2   [x] 3   [] 4   © , Trustees of [address] [place] [address] [address] [address], under license to [email or web address]. All rights reserved      Score:  Initial: 19 Most Recent: X (Date: -- )    Interpretation of Tool:  Represents activities that are increasingly more difficult (i.e. Bed mobility, Transfers, Gait). Score 24 23 22-20 19-15 14-10 9-7 6     Modifier CH CI CJ CK CL CM CN      ?  Mobility - Walking and Moving Around:     - CURRENT STATUS: CK - 40%-59% impaired, limited or restricted    - GOAL STATUS: CJ - 20%-39% impaired, limited or restricted    - D/C STATUS:  ---------------To be determined---------------  [place] / Product Type: Medicare /      Medical Necessity:     · Patient demonstrates good rehab potential due to higher previous functional level. Reason for Services/Other Comments:  · Patient continues to require skilled intervention due to decreased balance and ambulation. Use of outcome tool(s) and clinical judgement create a POC that gives a: Clear prediction of patient's progress: LOW COMPLEXITY            TREATMENT:      Pre-treatment Symptoms/Complaints:  None  Pain: Initial:      Post Session:  2/10      Therapeutic Activity: (    15 minutes): Therapeutic activities including Bed transfers, Chair transfers and Ambulation on level ground to improve mobility, strength and balance. Required minimal   to promote dynamic balance in standing and promote coordination of bilateral, lower extremity(s). Therapeutic Exercise: ( 9 minutes):  Exercises per grid below to improve mobility, strength and activity tolerance. Required minimal visual and verbal cues to promote proper body alignment, promote proper body mechanics and promote proper body breathing techniques. Progressed repetitions as indicated. Date:  6/20/18 Date:  6/21/18 Date:     ACTIVITY/EXERCISE AM PM AM PM AM PM   Seated LAQ  2 x 20 B X 20 B      Seated marching  2 x 20 B X 20 B      Seated AP  2 x 20 B X 20 B      Seated hip abd/add  2 x 20 B X 20 B                                 B = bilateral; AA = active assistive; A = active; P = passive          Braces/Orthotics/Lines/Etc:   · Wound vac  · O2 Device: Nasal cannula  Treatment/Session Assessment:    · Response to Treatment:  Tolerated fairly given decreased activity tolerance.   · Interdisciplinary Collaboration:   o Physical Therapy Assistant  o Registered Nurse  · After treatment position/precautions:   o Up in chair  o Bed alarm/tab alert on  o Bed/Chair-wheels locked  o Call light within reach  o RN notified   · Compliance with Program/Exercises: Will assess as treatment progresses. · Recommendations/Intent for next treatment session: \"Next visit will focus on advancements to more challenging activities and reduction in assistance provided\".   Total Treatment Duration:  PT Patient Time In/Time Out  Time In: 1058  Time Out: [address]

## 2018-06-21 NOTE — DIALYSIS
Treatment initiated using Right CVC by Alex Field. Both ports aspirated and flushed without problems. Heparin bolus 3500 units given. Machine set per MD orders. Pt VS stable. Will continue to monitor.

## 2018-06-21 NOTE — PROGRESS NOTES
D: Patient went for dialysis at 0700. Patient has been irritable and unable to rest.  Patient has had no family at bedside.  Will continue to provide comfort measures and promote rest.   Gilmer Rodriguez student nurse

## 2018-06-21 NOTE — OP NOTES
[address] Sofy Perez  MR#: [id number]  : 1950  ACCOUNT #: [de-identified]   DATE OF SERVICE: 2018    PREOPERATIVE DIAGNOSIS:  Right thigh AV graft infection. POSTOPERATIVE DIAGNOSIS:  Right thigh AV graft infection. PROCEDURE PERFORMED:  Removal of right thigh AV graft, patch angioplasty closure of right SFA, debridement of right thigh wound, wound VAC placement. SURGEON:  Manuel Nguyen MD     ASSISTANT:       ANESTHESIA:  General endotracheal.    ESTIMATED BLOOD LOSS:  50 mL. DRAINS:  None. SPECIMENS REMOVED:  None. COMPLICATIONS:  No complications. IMPLANTS:       INDICATIONS:  This is a 39-year-old female that had a right thigh graft placed about a month ago. Apparently, she suffered a fall with a subsequent injury to the area and developed a large blister as described by the ER doctor's report. This went on to become necrotic. The patient presented with a necrotic malodorous draining wound on the medial aspect of the right thigh at the area of incision. DESCRIPTION OF PROCEDURE:  The patient was brought to operating room and placed on the operating table in supine position. Following adequate general endotracheal anesthesia and a time-out procedure, right leg was draped and prepped in sterile fashion circumferentially. The area of epidermolysis and necrotic skin and soft tissue was excised. The AV graft was present deep to this in a large area of malodorous fluid. Once all the devitalized tissue had been excised with a scalpel, the grafts were dissected down to the arterial and venous anastomoses. At this point, the patient was heparinized. The venous limb of the graft was then excised from the femoral vein and the femoral vein resultant defect was closed primarily with a running 6-0 Prolene suture. Next, attention was turned to the arterial side.   The SFA was clamped proximally and distally to the anastomosis. The anastomosis was then taken down and the remaining AV graft was removed including that in the enterocutaneous segment. No PTFE was left remaining in the right leg. At this point, a CorMatrix patch was used to perform a patch angioplasty of the arterial wall defect. This was done with a 6-0 Prolene suture. Prior to completion of the patch closure, the SFA was flushed and backbled. The closure was then completed and flow was restored. At this point, the wound was copiously irrigated with the Pulsavac and antibiotic solution. The overlying sartorius muscle was then reapproximated to cover the area of arteriovenous repair. The remaining soft tissue segments were reapproximated as good as possible, although there was significant resulting soft tissue defect. A wound VAC was applied. The patient was awakened from anesthesia and transported to the recovery room in stable condition. The patient tolerated procedure well. No complications. All needle, sponge counts were correct.       MD Kirshna Montanez / Ang Camp  D: 06/20/2018 11:39     T: 06/20/2018 13:38  JOB #: [id number]

## 2018-06-21 NOTE — PROGRESS NOTES
Problem: Falls - Risk of  Goal: *Absence of Falls  Document Farhan Fall Risk and appropriate interventions in the flowsheet. Outcome: Progressing Towards Goal  Fall Risk Interventions:  Mobility Interventions: Bed/chair exit alarm, Patient to call before getting OOB    Mentation Interventions: Adequate sleep, hydration, pain control, Bed/chair exit alarm    Medication Interventions: Bed/chair exit alarm, Patient to call before getting OOB    Elimination Interventions: Bed/chair exit alarm, Call light in reach, Patient to call for help with toileting needs    History of Falls Interventions: Bed/chair exit alarm        Problem: Pressure Injury - Risk of  Goal: *Prevention of pressure injury  Document James Scale and appropriate interventions in the flowsheet.    Outcome: Progressing Towards Goal  Pressure Injury Interventions:  Sensory Interventions: Assess changes in LOC    Moisture Interventions: Absorbent underpads    Activity Interventions: Increase time out of bed, Pressure redistribution bed/mattress(bed type), PT/OT evaluation    Mobility Interventions: HOB 30 degrees or less, Pressure redistribution bed/mattress (bed type), PT/OT evaluation    Nutrition Interventions: Document food/fluid/supplement intake    Friction and Shear Interventions: HOB 30 degrees or less, Lift sheet, Minimize layers

## 2018-06-21 NOTE — PROGRESS NOTES
Attempted to see patient for Initial OT Evaluation however patient is off the floor at this time for HD. Will attempt to see at a later time.     Toby Boston OT  6/21/2018

## 2018-06-21 NOTE — PROGRESS NOTES
Cami Lockhart  Admission Date: 6/18/2018             Daily Progress Note: 6/21/2018    The patient's chart is reviewed and the patient is discussed with the staff. Subjective:     Patient seen on HD via right  Qb, UF 2600.   Patient reports feeling well, no complaints on HD, denies SOb, CP, N/V/D.    ROS  Gen - no fever, no chills  CV - no chest pain, no palpitation  Lung - no shortness of breath, no cough  Abd - no tenderness, no nausea/vomiting, no diarrhea  Ext - no edema    Current Facility-Administered Medications   Medication Dose Route Frequency    gabapentin (NEURONTIN) capsule 100 mg  100 mg Oral BID    heparin (porcine) 1,000 unit/mL injection 5,000 Units  5,000 Units Hemodialysis DIALYSIS PRN    amoxicillin-clavulanate (AUGMENTIN) 500-125 mg per tablet 1 Tab  1 Tab Oral Q24H    albuterol (PROVENTIL HFA, VENTOLIN HFA, PROAIR HFA) inhaler 1 Puff  1 Puff Inhalation Q6H PRN    ALPRAZolam (XANAX) tablet 0.5 mg  0.5 mg Oral TID PRN    aspirin delayed-release tablet 81 mg  81 mg Oral DAILY    cinacalcet (SENSIPAR) tablet 30 mg  30 mg Oral QHS    HYDROmorphone (DILAUDID) syringe 0.5 mg  0.5 mg IntraVENous Q5MIN PRN    oxyCODONE-acetaminophen (PERCOCET) 5-325 mg per tablet 1 Tab  1 Tab Oral Q4H PRN    morphine injection 2 mg  2 mg IntraVENous Q2H PRN    heparin (porcine) injection 5,000 Units  5,000 Units SubCUTAneous Q8H         Objective:     Vitals:    06/21/18 0346 06/21/18 0719 06/21/18 0731 06/21/18 0755   BP: 119/71 131/40 126/44 (!) 112/39   Pulse: 84 81 76 71   Resp: 18      Temp: 98.7 °F (37.1 °C)      SpO2: 92%      Weight:       Height:         Intake and Output:           Physical Exam:   Constitutional: Alert and oriented X4, the patient is well developed and in no acute distress  HEENT:  Sclera clear, pupils equal, oral mucosa moist  Lungs: Clear bilaterally  Cardiovascular:  RRR without Rub, murmur  Abd/GI: soft and non-tender; with positive bowel sounds. Ext: warm without cyanosis. There is no lower leg edema. Musculoskeletal: moves all four extremities with equal strength  Skin:  no jaundice or rashes, right thigh wound with wound vac  Neuro: no gross neuro deficits   Musculoskeletal: can ambulate. No deformity  Psychiatric: Calm. Access- Right TCC intact no signs and symptoms of infection      LAB  Recent Labs      06/21/18   0516  06/19/18   1804   WBC  4.7  6.3   HGB  8.5*  9.5*   HCT  27.5*  29.9*   PLT  207  218     Recent Labs      06/21/18   0516  06/19/18   1804  06/18/18   1309   NA  142   --    --    K  5.0   --   5.7*   CL  104   --    --    CO2  24   --    --    GLU  78   --    --    BUN  35*   --    --    CREA  10.40*   --    --    PHOS   --   4.5*   --      No results for input(s): PH, PCO2, PO2, HCO3 in the last 72 hours. Assessment:  (Medical Decision Making)     Hospital Problems  Date Reviewed: 10/12/2017          Codes Class Noted POA    * (Principal)Infection of AV graft for dialysis ([facility] [address] ICD-10-CM: T82. 7XXA  ICD-9-CM: 996.62  6/18/2018 Yes        ESRD on dialysis [facility]) (Chronic) ICD-10-CM: N18.6, Z99.2  ICD-9-CM: 585.6, V45.11  6/16/2010 Yes              Plan:  (Medical Decision Making)    1. ESRD-  next HD tomorrow to get back on regular outpatient schedule. - outpatient unit- Ricardo Jang M/W/F    2. AVG infection/abcess- s/p removal of right thigh AVG with wound debridement 6/19- now with wound vac. - on Augmentin    3.  Anemia- [employer]    Shivani Mays NP

## 2018-06-21 NOTE — PROGRESS NOTES
Progress Note    Patient: Kandy Fabry MRN: [id number]  SSN: [id number]    YOB: 1950  Age: 70 y.o. Sex: female      Admission Date: 6/18/2018    LOS: 3 days     3 Days Post-Op    PROCEDURE(S):  Removal of right thigh AV graft, patch angioplasty closure of right SFA, debridement of right thigh wound, wound VAC placement    Subjective:     Patient seen in HD, reports fatigue but denies significant thigh pain at incision site.     Objective:     Vitals:    06/21/18 0852 06/21/18 0928 06/21/18 0958 06/21/18 1011   BP: 118/68 121/56 119/46 129/55   Pulse: 71 67 66 (!) 49   Resp:       Temp:       SpO2:       Weight:       Height:            Physical Exam:   GENERAL: alert, cooperative, no distress  EYE: EOM intact, no nystagmus  LUNG: clear to auscultation bilaterally, normal respiratory effort  HEART: regular rate, numerous premature beats superimposed on regular underlying rhythm  ABDOMEN: soft, nontender, nondistended, bowel sounds normoactive  EXTREMITIES: warm, normal ROM; proximal right thigh with wound vac in place; numerous non-functional / failed HD accesses in B UE, left thigh     Lab/Data Review:  BMP:   Lab Results   Component Value Date/Time     06/21/2018 05:16 AM    K 5.0 06/21/2018 05:16 AM     06/21/2018 05:16 AM    CO2 24 06/21/2018 05:16 AM    AGAP 14 06/21/2018 05:16 AM    GLU 78 06/21/2018 05:16 AM    BUN 35 (H) 06/21/2018 05:16 AM    CREA 10.40 (H) 06/21/2018 05:16 AM    GFRAA 5 (L) 06/21/2018 05:16 AM    GFRNA 4 (L) 06/21/2018 05:16 AM     CBC:   Lab Results   Component Value Date/Time    WBC 4.7 06/21/2018 05:16 AM    HGB 8.5 (L) 06/21/2018 05:16 AM    HCT 27.5 (L) 06/21/2018 05:16 AM     06/21/2018 05:16 AM        Assessment / Plan:     Principal Problem:    Infection of AV graft for dialysis ([address] (6/18/2018)    Active Problems:    ESRD on dialysis ([facility] [address] (6/16/2010)        Continue wound vac  ABX at renal dosing per PharmD  Appreciate PT, OT  PPD negative  Awaiting SNF placement      Signed By: Nohemi Valdez PA-C    June 21, 2018      Physician Assistant with Vascular Surgery Joel Gooden MD / Felipe Barlow.  George Stallworth MD / Princess Dial MD

## 2018-06-22 ENCOUNTER — HOSPITAL ENCOUNTER (EMERGENCY)
Age: 68
Discharge: HOME | End: 2018-06-22
Attending: EMERGENCY MEDICINE
Payer: MEDICARE

## 2018-06-22 VITALS
SYSTOLIC BLOOD PRESSURE: 174 MMHG | DIASTOLIC BLOOD PRESSURE: 72 MMHG | HEART RATE: 88 BPM | OXYGEN SATURATION: 97 % | TEMPERATURE: 98.1 F | RESPIRATION RATE: 18 BRPM

## 2018-06-22 VITALS
WEIGHT: 185.5 LBS | TEMPERATURE: 98.5 F | OXYGEN SATURATION: 94 % | HEART RATE: 105 BPM | BODY MASS INDEX: 37.4 KG/M2 | SYSTOLIC BLOOD PRESSURE: 117 MMHG | DIASTOLIC BLOOD PRESSURE: 71 MMHG | RESPIRATION RATE: 17 BRPM | HEIGHT: 59 IN

## 2018-06-22 DIAGNOSIS — Z51.89 ENCOUNTER FOR WOUND CARE: Primary | ICD-10-CM

## 2018-06-22 PROCEDURE — 90935 HEMODIALYSIS ONE EVALUATION: CPT

## 2018-06-22 PROCEDURE — 5A1D70Z PERFORMANCE OF URINARY FILTRATION, INTERMITTENT, LESS THAN 6 HOURS PER DAY: ICD-10-PCS | Performed by: INTERNAL MEDICINE

## 2018-06-22 PROCEDURE — 74011250636 HC RX REV CODE- 250/636: Performed by: SURGERY

## 2018-06-22 PROCEDURE — 74011250637 HC RX REV CODE- 250/637: Performed by: SURGERY

## 2018-06-22 PROCEDURE — 99284 EMERGENCY DEPT VISIT MOD MDM: CPT | Performed by: EMERGENCY MEDICINE

## 2018-06-22 PROCEDURE — 74011250636 HC RX REV CODE- 250/636: Performed by: INTERNAL MEDICINE

## 2018-06-22 PROCEDURE — 74011250637 HC RX REV CODE- 250/637: Performed by: INTERNAL MEDICINE

## 2018-06-22 RX ORDER — AMOXICILLIN AND CLAVULANATE POTASSIUM 500; 125 MG/1; MG/1
1 TABLET, FILM COATED ORAL EVERY 24 HOURS
Qty: 7 TAB | Refills: 0 | Status: SHIPPED | OUTPATIENT
Start: 2018-06-22 | End: 2018-06-26

## 2018-06-22 RX ORDER — HYDROCODONE BITARTRATE AND ACETAMINOPHEN 5; 325 MG/1; MG/1
1 TABLET ORAL
Qty: 12 TAB | Refills: 0 | Status: SHIPPED
Start: 2018-06-22 | End: 2018-08-14

## 2018-06-22 RX ADMIN — OXYCODONE HYDROCHLORIDE AND ACETAMINOPHEN 1 TABLET: 5; 325 TABLET ORAL at 00:52

## 2018-06-22 RX ADMIN — GABAPENTIN 100 MG: 100 CAPSULE ORAL at 10:07

## 2018-06-22 RX ADMIN — ASPIRIN 81 MG: 81 TABLET, COATED ORAL at 10:07

## 2018-06-22 RX ADMIN — HEPARIN SODIUM 5000 UNITS: 5000 INJECTION, SOLUTION INTRAVENOUS; SUBCUTANEOUS at 00:53

## 2018-06-22 RX ADMIN — HEPARIN SODIUM 5000 UNITS: 1000 INJECTION, SOLUTION INTRAVENOUS; SUBCUTANEOUS at 07:01

## 2018-06-22 RX ADMIN — OXYCODONE HYDROCHLORIDE AND ACETAMINOPHEN 1 TABLET: 5; 325 TABLET ORAL at 10:07

## 2018-06-22 NOTE — PROGRESS NOTES
Cami Wilkinson  Admission Date: 6/18/2018             Daily Progress Note: 6/22/2018    The patient's chart is reviewed and the patient is discussed with the staff. Subjective:     Patient seen on HD via right  Qb, UF 1600.   Patient reports feeling well, no complaints on HD    ROS  Gen - no fever, no chills  CV - no chest pain, no palpitation  Lung - no shortness of breath, no cough  Abd - no tenderness, no nausea/vomiting, no diarrhea  Ext - no edema    Current Facility-Administered Medications   Medication Dose Route Frequency    gabapentin (NEURONTIN) capsule 100 mg  100 mg Oral BID    heparin (porcine) 1,000 unit/mL injection 5,000 Units  5,000 Units Hemodialysis DIALYSIS PRN    amoxicillin-clavulanate (AUGMENTIN) 500-125 mg per tablet 1 Tab  1 Tab Oral Q24H    albuterol (PROVENTIL HFA, VENTOLIN HFA, PROAIR HFA) inhaler 1 Puff  1 Puff Inhalation Q6H PRN    ALPRAZolam (XANAX) tablet 0.5 mg  0.5 mg Oral TID PRN    aspirin delayed-release tablet 81 mg  81 mg Oral DAILY    cinacalcet (SENSIPAR) tablet 30 mg  30 mg Oral QHS    HYDROmorphone (DILAUDID) syringe 0.5 mg  0.5 mg IntraVENous Q5MIN PRN    oxyCODONE-acetaminophen (PERCOCET) 5-325 mg per tablet 1 Tab  1 Tab Oral Q4H PRN    morphine injection 2 mg  2 mg IntraVENous Q2H PRN    heparin (porcine) injection 5,000 Units  5,000 Units SubCUTAneous Q8H         Objective:     Vitals:    06/21/18 2321 06/22/18 0358 06/22/18 0656 06/22/18 0733   BP: 109/61 117/70 99/69 (!) 142/94   Pulse: 90 84 87 68   Resp: 18 18 16    Temp: 98.4 °F (36.9 °C) 98.6 °F (37 °C)     SpO2: 96% 93% (!) 87%    Weight:       Height:         Intake and Output:   06/20 1901 - 06/22 0700  In: -   Out: 2000        Physical Exam:   Constitutional: Alert and oriented X4, the patient is well developed and in no acute distress  HEENT:  Sclera clear, pupils equal, oral mucosa moist  Lungs: Clear bilaterally  Cardiovascular:  RRR without Rub, murmur  Abd/GI: soft and non-tender; with positive bowel sounds. Ext: warm without cyanosis. There is no lower leg edema. Musculoskeletal: moves all four extremities with equal strength  Skin:  no jaundice or rashes, right thigh wound with wound vac  Neuro: no gross neuro deficits   Musculoskeletal: can ambulate. No deformity  Psychiatric: Calm. Access- Right TCC intact no signs and symptoms of infection      LAB  Recent Labs      06/21/18   0516  06/19/18   1804   WBC  4.7  6.3   HGB  8.5*  9.5*   HCT  27.5*  29.9*   PLT  207  218     Recent Labs      06/21/18   0516  06/19/18   1804   NA  142   --    K  5.0   --    CL  104   --    CO2  24   --    GLU  78   --    BUN  35*   --    CREA  10.40*   --    PHOS   --   4.5*         Assessment:  (Medical Decision Making)     Hospital Problems  Date Reviewed: 10/12/2017          Codes Class Noted POA    * (Principal)Infection of AV graft for dialysis ([facility] [address] ICD-10-CM: T82. 7XXA  ICD-9-CM: 996.62  6/18/2018 Yes        ESRD on dialysis [facility]) (Chronic) ICD-10-CM: N18.6, Z99.2  ICD-9-CM: 585.6, V45.11  6/16/2010 Yes              Plan:  (Medical Decision Making)    1. ESRD-  next HD Monday to get back on regular outpatient schedule. - outpatient unit- Lowell Coyle M/W/F  -awaiting SNF placement    2. AVG infection/abcess- s/p removal of right thigh AVG with wound debridement 6/19- now with wound vac. - on Augmentin    3.  Anemia- 8.5    -[employer]    Santiago Draper NP

## 2018-06-22 NOTE — PROGRESS NOTES
Problem: Falls - Risk of  Goal: *Absence of Falls  Document Farhan Fall Risk and appropriate interventions in the flowsheet. Outcome: Progressing Towards Goal  Fall Risk Interventions:  Mobility Interventions: Assess mobility with egress test, Bed/chair exit alarm, Communicate number of staff needed for ambulation/transfer, Patient to call before getting OOB    Mentation Interventions: Adequate sleep, hydration, pain control, Bed/chair exit alarm, Door open when patient unattended, Eyeglasses and hearing aids, Familiar objects from home, Family/sitter at bedside, More frequent rounding, Reorient patient, Room close to nurse's station, Toileting rounds, Update white board    Medication Interventions: Assess postural VS orthostatic hypotension, Bed/chair exit alarm, Patient to call before getting OOB, Teach patient to arise slowly    Elimination Interventions: Bed/chair exit alarm, Call light in reach, Patient to call for help with toileting needs, Toilet paper/wipes in reach, Toileting schedule/hourly rounds    History of Falls Interventions: Bed/chair exit alarm, Door open when patient unattended, Room close to nurse's station        Problem: Pressure Injury - Risk of  Goal: *Prevention of pressure injury  Document James Scale and appropriate interventions in the flowsheet.    Outcome: Progressing Towards Goal  Pressure Injury Interventions:  Sensory Interventions: Assess changes in LOC    Moisture Interventions: Absorbent underpads    Activity Interventions: Increase time out of bed, Pressure redistribution bed/mattress(bed type), PT/OT evaluation    Mobility Interventions: HOB 30 degrees or less, Pressure redistribution bed/mattress (bed type), PT/OT evaluation    Nutrition Interventions: Document food/fluid/supplement intake, Offer support with meals,snacks and hydration    Friction and Shear Interventions: Apply protective barrier, creams and emollients, HOB 30 degrees or less, Lift sheet, Minimize layers

## 2018-06-22 NOTE — PROGRESS NOTES
Late Note: In accordance with Medicare guidelines, a copy of the Important Letter from Medicare was presented to the patient/family in anticipation of expected discharge within 48 hours. One copy was given to the patient, and a signed copy was placed in the patients chart.

## 2018-06-22 NOTE — DIALYSIS
Hemodialysis begun via right perm cath without difficulty by [facility], RN/ Heparin 3500 bolus given. Settings per orders. Patient is alert and denies complaints. Will continue to monitor while on dialysis.

## 2018-06-22 NOTE — PROGRESS NOTES
Patient is discharging today to [facility] [address]. Assigned nurse was provided with report and room number. Patient is aware. No request from patient to contact family. Transport by [facility] scheduled for 11:15AM.    Care Management Interventions  PCP Verified by CM:  Yes  Mode of Transport at Discharge: BLS (lucio at 11:15AM)  Transition of Care Consult (CM Consult): SNF ([facility] Room [facility])  Partner SNF: Yes  Discharge Durable Medical Equipment: No (To be ordered at SNF as needed)  Physical Therapy Consult: Yes  Occupational Therapy Consult: Yes  Speech Therapy Consult: No  Current Support Network: Own Home (Lives with female roommate)  Confirm Follow Up Transport: Friends  Plan discussed with Pt/Family/Caregiver: Yes  Freedom of Choice Offered: Yes  Discharge Location  Discharge Placement: Skilled nursing facility

## 2018-06-22 NOTE — DIALYSIS
Off dialysis at 0915 and 0.5 Kg's removed. [facility] perma Catheter flushed with 10 ml NS per protocol. Vital signs  HR=65, KQ=909/94. Pt noted alert and oriented. Pt to room after treatment completed.

## 2018-06-22 NOTE — ED TRIAGE NOTES
Pt reports she had her dialysis catheter removed today and is mad they did not put on a wound vacuum on the site. Pt states she will not leave until placed. Pt called EMS from her room because she said they didn't put the wound vac on.

## 2018-06-22 NOTE — PROGRESS NOTES
CM received a call from Ari Carrillo of [address] [place] [address], S.. Dialysis. CM provided update regarding patient's plan for discharge to SNF at [facility]. Patient's HD slot is MWF at 11:30AM. CM will include in discharge SNF forms for facility.

## 2018-06-22 NOTE — DISCHARGE SUMMARY
11 [facility], [address] [place] [address]          Physician Discharge Summary     Patient: Pj Rodrigues MRN: [id number]  SSN: [id number]    YOB: 1950  Age: 70 y.o. Sex: female       Admission Date: 6/18/2018    Discharge Date: 6/22/2018      Admitting Physician: Yoly Dubois MD     Discharge Provider: Sherri Gee PA-C    Admission Diagnoses: NECROTIC RIGHT THIGH WOUND  Infection of AV graft for dialysis [facility])    Discharge Diagnoses:   Problem List as of 6/22/2018  Date Reviewed: 6/22/2018          Codes Class Noted - Resolved    * (Principal)Infection of AV graft for dialysis [facility]) ICD-10-CM: T82. 7XXA  ICD-9-CM: 996.62  6/18/2018 - Present        Hyperkalemia ICD-10-CM: E87.5  ICD-9-CM: 276.7  6/1/2018 - Present        Secondary pulmonary hypertension ICD-10-CM: PDQ1556  ICD-9-CM: 416.8  8/3/2017 - Present        Rheumatic mitral regurgitation ICD-10-CM: I05.1  ICD-9-CM: 394.1  8/3/2017 - Present        Rheumatic aortic stenosis ICD-10-CM: I06.0  ICD-9-CM: 395.0  8/3/2017 - Present        Class 2 severe obesity due to excess calories with serious comorbidity in adult [facility]) (Chronic) ICD-10-CM: E66.01  ICD-9-CM: 278.01  5/26/2017 - Present        Dyslipidemia ICD-10-CM: E78.5  ICD-9-CM: 272.4  5/26/2017 - Present        Clotted dialysis access [facility]) ICD-10-CM: T82.49XA  ICD-9-CM: 996.1  5/18/2015 - Present    Overview Signed 5/19/2015  4:16 PM by Indra Zarate NP     5/19/15 (Dr Rand Bush) DECLOT LEFT THIGH AV GRAFT              Non compliance with medical treatment ICD-10-CM: Z91.19  ICD-9-CM: V15.81  9/2/2010 - Present        ESRD on dialysis [facility]) (Chronic) ICD-10-CM: N18.6, Z99.2  ICD-9-CM: 585.6, V45.11  6/16/2010 - Present        Anemia associated with chronic renal failure ICD-10-CM: D63.1  ICD-9-CM: 285.21  6/16/2010 - Present        COPD (chronic obstructive pulmonary disease) ([facility] (Chronic) ICD-10-CM: J44.9  ICD-9-CM: 420  6/16/2010 - Present        Hypertension (Chronic) ICD-10-CM: I10  ICD-9-CM: 401.9  6/16/2010 - Present        Benzodiazepine dependence ([facility] (Chronic) ICD-10-CM: I79.67  ICD-9-CM: 304.10  6/16/2010 - Present        RESOLVED: Shortness of breath ICD-10-CM: R06.02  ICD-9-CM: 786.05  8/3/2017 - 6/1/2018    Overview Addendum 10/12/2017 11:14 AM by Newton Skelton MD     Jul 2017 - Echo EF 68%, moderate LVH, images personally reviewed - grade 2 diastolic dysfunction with severe/critical AS, peak gradient 99, mean in the 70's, calculated LAURA 0.5-0.6 with at least moderate AI, heavy MAC with moderate to severe MR, moderate to severe TR with RVSP 72.  Cath - PA 78/34, AV gradient 53 (mean), LAURA 0.53, 2-3+ MR, 80% distal LAD disease             RESOLVED: Chest discomfort ICD-10-CM: R07.89  ICD-9-CM: 786.59  5/26/2017 - 6/1/2018        RESOLVED: Fever ICD-10-CM: R50.9  ICD-9-CM: 780.60  5/13/2015 - 5/18/2015        RESOLVED: Acute exacerbation of chronic obstructive pulmonary disease (COPD) ([facility] ICD-10-CM: J44.1  ICD-9-CM: 491.21  5/13/2015 - 6/1/2018        RESOLVED: Hyperkalemia ICD-10-CM: E87.5  ICD-9-CM: 276.7  5/10/2015 - 5/18/2015        RESOLVED: Acute on chronic renal failure (HCC) ICD-10-CM: N17.9, N18.9  ICD-9-CM: 584.9, 585.9  5/10/2015 - 6/1/2018               Procedures for this Admission: Procedure(s):  Removal of right thigh AV graft, patch angioplasty closure of right SFA, debridement of right thigh wound, wound VAC placement    Discharged Condition: stable    Brief History: John Rivers was admitted with the following history of present illness. The patient is a 68yo female with ESRD on HD in [facility] via TCC due to exhaustion of all upper extremity access sites and recent placement of right thigh AVG (5/29/2018, Kaylyn Currie MD) awaiting maturity.  On day of admission, the patient presented to the office with complaints of bleeding ulceration over AVG x12d; upon evaluation by office NP Enrike De La Garza then Rajan Lazaro MD, decision was made to admit Ms. Yousif Berg for evaluation and debridement of right thigh wound in the OR, possible AV graft removal.    Hospital Course: The patient was admitted directly from the office, and as she was NPO, she was taken to the operating room later that afternoon and underwent the above-noted procedures. After extubation in the OR and brief stay in PACU, the patient was transferred to surgical floor to begin formal recovery. Nephrology service was consulted to facilitated dialysis and assist with her care. She had a wound vac device placed on her thigh wound intraoperatively, and Wound / Ostomy / Continence RNs were consulted to help manage wound vac care. Her pain was soon controlled on IV medication, and she was gradually transitioned to PO pain medication. She tolerated an advancing diet. She underwent dialysis several times. She participated with physical and occupational therapies. She voided without difficulty, and her bowel function resumed. On POD #4, she was stable and deemed suitable for discharge to skilled nursing facility for short-term rehabilitation. Consults: Nephrology    Significant Diagnostic Studies: labs    Treatments:   IV hydration  antibiotics: Ancef, amoxicillin / clavulanate  analgesia: Dilaudid, morphine, oxycodone w/ acetaminophen  anticoagulation: ASA, heparin  dialysis: hemodialysis  surgery: as noted above    Discharge Exam:  GENERAL: alert, cooperative, no distress  EYE: EOM intact, no nystagmus  LUNG: clear to auscultation bilaterally, normal respiratory effort  HEART: regular rate and rhythm, numerous premature beats  ABDOMEN: soft, nontender, protuberant but not distended, bowel sounds normoactive  EXTREMITIES: warm, normal ROM; wound vac in place at right proximal thigh  PULSES: radial 2+ palpable and symmetric    Disposition: skilled nursing facility    SNF Timelines / Orders  1.  Last dialysis was today, 6/22/2018; resume MWF dialysis beginning Monday, 6/25/2018 at [facility] at 11:30 AM.  2. Last wound vac change was 6/20/2018; perform right thigh wound assessment and wound vac change on arrival at [facility]. 3. Continue oral antibiotics [Augmentin 500mg 1 po q day (renal dosing)] x 7 more days; end of treatment (last dose) is 6/28/2018. 4. Follow up in vascular surgery office with Ky Andrade MD on 7/13/2018 at 10:45 AM (office # [phone]). Patient Instructions:   Current Discharge Medication List      START taking these medications    Details   amoxicillin-clavulanate (AUGMENTIN) 500-125 mg per tablet Take 1 Tab by mouth every twenty-four (24) hours for 7 days. Indications: Skin and Skin Structure Infection, dose adjusted for renal function  Qty: 7 Tab, Refills: 0    Associated Diagnoses: Infection of AV graft for dialysis ([facility])      HYDROcodone-acetaminophen (NORCO) 5-325 mg per tablet Take 1 Tab by mouth every six (6) hours as needed for Pain. Max Daily Amount: 4 Tabs. Qty: 12 Tab, Refills: 0    Associated Diagnoses: Infection of AV graft for dialysis ([facility]         CONTINUE these medications which have NOT CHANGED    Details   midodrine (PROAMITINE) 5 mg tablet Take 1 Tab by mouth three (3) times daily (with meals) for 30 days. Qty: 90 Tab, Refills: 0      aspirin delayed-release 81 mg tablet Take 81 mg by mouth daily. clopidogrel (PLAVIX) 75 mg tab Take 75 mg by mouth daily. VIT B COMP C/FOLIC ACID/VIT D3 (DIALYVITE 800 PLUS D PO) Take  by mouth. Associated Diagnoses: Acute on chronic renal failure (HCC)      omeprazole (PRILOSEC) 20 mg capsule Take 20 mg by mouth as needed. Refills: 6      RENVELA 800 mg tab tab Take 800 mg by mouth three (3) times daily (with meals). Refills: 6      traMADol (ULTRAM) 50 mg tablet Take 50 mg by mouth four (4) times daily as needed for Pain (for leg pain).       gabapentin (NEURONTIN) 100 mg capsule Take 100 mg by mouth two (2) times a day. Indications: \"take it when my nerves are bad\". cinacalcet (SENSIPAR) 30 mg tablet Take 30 mg by mouth nightly. albuterol (PROAIR HFA) 90 mcg/actuation inhaler Take 1 Puff by inhalation every six (6) hours as needed for Wheezing or Shortness of Breath. Qty: 1 Inhaler, Refills: 1      ALPRAZolam (XANAX) 0.5 mg tablet Take 0.5 mg by mouth three (3) times daily as needed for Anxiety. Reference MD discharge instructions, as well as those provided by nursing for diet, labs, medications, activity, wound care and any outpatient referrals. I have reviewed the patients controlled substance prescription history as maintained in the [address] [place] [address] prescription monitoring program so that the prescription for a controlled substance can be given. Follow-up Appointments   Procedures    FOLLOW UP VISIT Appointment in: Other (Specify) Follow up in office with Dr. Moises Cheney on 7/13/2018 at 10:45 AM.     Follow up in office with Dr. Moises Cheney on 7/13/2018 at 10:45 AM.     Standing Status:   Standing     Number of Occurrences:   1     Order Specific Question:   Appointment in     Answer: Other (Specify)        Signed: Maricarmen De La Torre PA-C  6/22/2018 10:11 AM  Physician Assistant with Vascular Surgery Jennie Payton MD / Rose Abad.  Ladi Vilchis MD / Qing Siu MD

## 2018-06-22 NOTE — PROGRESS NOTES
Progress Note    Patient: Sara Urbina MRN: [id number]  SSN: [id number]    YOB: 1950  Age: 70 y.o. Sex: female      Admission Date: 6/18/2018    LOS: 4 days     4 Days Post-Op    PROCEDURE(S):  Removal of right thigh AV graft, patch angioplasty closure of right SFA, debridement of right thigh wound, wound VAC placement    Subjective:     Patient seen in HD, no complaints. Ready for transfer to SNF, next HD Monday, 6/25/2018, last wound vac change 6/20/2018. Objective:     Vitals:    06/22/18 0801 06/22/18 0831 06/22/18 0900 06/22/18 0913   BP: 111/44 134/78 177/86 (!) 127/94   Pulse: 77 82 94 65   Resp:       Temp:       SpO2:       Weight:       Height:            Physical Exam:   GENERAL: alert, cooperative, no distress  EYE: EOM intact, no nystagmus  LUNG: clear to auscultation bilaterally, normal respiratory effort  HEART: regular rate and rhythm, numerous premature beats  ABDOMEN: soft, nontender, protuberant but not distended, bowel sounds normoactive  EXTREMITIES: warm, normal ROM; wound vac in place at right proximal thigh  PULSES: radial 2+ palpable and symmetric    Lab/Data Review: All lab results for the last 48 hours reviewed. Assessment / Plan:     Principal Problem:    Infection of AV graft for dialysis [facility]) (6/18/2018)    Active Problems:    ESRD on dialysis [facility]) (6/16/2010)        D/C to [facility]  Continue ABX x1 more week  Right thigh wound assessment and wound vac change on arrival  Next HD on Monday, 6/25/2018  Follow up in office with Caridad Carrion MD on 7/13/2018 at 10:45 AM for wound recheck and discussion on permanent HD access placement      Signed By: Bridget Allen PA-C    June 22, 2018      Physician Assistant with Vascular Surgery Milli Sebastian MD / Lakisha Napoles.  Zoe Severe, MD / Philip Jaime MD

## 2018-06-23 NOTE — ED NOTES
I have reviewed discharge instructions with the patient. Pt encouraged to return to rehab for treatment. Pt refuses. She states, \"I will not be treated like a slave. I do not like the way I was treated there. \" Pt provided with materials and education for wet to dry dressing. The patient verbalized understanding    Patient left ED via Discharge Method: ambulatory to Home with cab. Opportunity for questions and clarification provided. Patient given 0 scripts. To continue your aftercare when you leave the hospital, you may receive an automated call from our care team to check in on how you are doing. This is a free service and part of our promise to provide the best care and service to meet your aftercare needs.  If you have questions, or wish to unsubscribe from this service please call [phone]. Thank you for Choosing our [facility] Emergency Department.

## 2018-06-23 NOTE — ED PROVIDER NOTES
HPI Comments: 69 yo female was discharged from hospital today to [facility] after Dr Zina Montelongo performed Removal of right thigh AV graft, patch angioplasty closure of right SFA, debridement of right thigh wound, wound VAC placement. Wound care nurse was changing dressing while awaiting wound vac arrival and pt demanded to come to ED for wound vac placement. She states she does not like how the white woman treated her and will not go back. Patient is a 70 y.o. female presenting with other event. The history is provided by the patient. Other          Past Medical History:   Diagnosis Date    Chronic kidney disease     ESRD    Dialysis patient ([facility]     Teresa Berry [phone] mon,wed,fri    GERD (gastroesophageal reflux disease)     Hyperlipidemia     Hypertension     Infection of AV graft for dialysis ([facility] 6/18/2018    Kidney failure     Liver disease     hepatitis C    Obesity     Other ill-defined conditions(799.89)     peripheral neuropathy, Pt states she doesn't have COPD or HTN    Peripheral neuropathy     Psychotic disorder        Past Surgical History:   Procedure Laterality Date    HX OTHER SURGICAL      access--left leg. axcess placed in both upper arms     HX UROLOGICAL      left nephrectomy    VASCULAR SURGERY PROCEDURE UNLIST Right 05/29/2018    thigh graft          Family History:   Problem Relation Age of Onset    Heart Disease Mother     Hypertension Mother     Diabetes Mother     Hypertension Father        Social History     Social History    Marital status:      Spouse name: N/A    Number of children: N/A    Years of education: N/A     Occupational History    Not on file.      Social History Main Topics    Smoking status: Never Smoker    Smokeless tobacco: Never Used    Alcohol use No    Drug use: No      Comment: says even if she did she wouldn't tell me    Sexual activity: Not on file     Other Topics Concern    Not on file     Social History Narrative ALLERGIES: Vancomycin    Review of Systems   Skin: Positive for wound. Vitals:    06/22/18 1814   BP: 133/47   Pulse: 89   Resp: 16   Temp: 98.3 °F (36.8 °C)   SpO2: 96%            Physical Exam   Constitutional: She appears well-developed and well-nourished. No distress. HENT:   Head: Normocephalic and atraumatic. Neurological: She is alert. Skin:        Psychiatric: She has a normal mood and affect. Nursing note and vitals reviewed. MDM  Number of Diagnoses or Management Options  Encounter for wound care:   Diagnosis management comments: Parts of this document were created using dragon voice recognition software. The chart has been reviewed but errors may still be present. robert [employer]. Wound care nurse has her wound vac but pt demanded to leave. robert Luna, on call for vascular. Recommended wet to dry if she refused wound vac. Pt continued to refuse to go back to [facility] and instead wanted to go home. Discussed we could not place a wound vac in ED or admit pt for wound vac. Discussed risk of wound contamination if she went home and she refused to transport back to rehab. She is alert and oriented with capacity to make her own decisions. Will dc. I discussed the results of all labs, procedures, radiographs, and treatments with the patient and available family. Treatment plan is agreed upon and the patient is ready for discharge. Questions about treatment in the ED and differential diagnosis of presenting condition were answered. Patient was given verbal discharge instructions including, but not limited to, importance of returning to the emergency department for any concern of worsening or continued symptoms. Instructions were given to follow up with a primary care provider or specialist within 1-2 days.   Adverse effects of medications, if prescribed, were discussed and patient was advised to refrain from significant physical activity until followed up by primary care physician and to not drive or operate heavy machinery after taking any sedating substances.             ED Course       Procedures

## 2018-06-25 ENCOUNTER — PATIENT OUTREACH (OUTPATIENT)
Dept: CASE MANAGEMENT | Age: 68
End: 2018-06-25

## 2018-06-25 NOTE — PROGRESS NOTES
Please note this patient was IP d/c to [facility] a Preferred SNF and will be followed by Nenita Mc RN and included in weekly Care Coordination calls until d/c. Tabitha Roblero LPN/ Care Coordinator  [address]  [email or web address]]. [facility] will not be viewable in [address]

## 2018-06-26 ENCOUNTER — HOME HEALTH ADMISSION (OUTPATIENT)
Dept: HOME HEALTH SERVICES | Facility: HOME HEALTH | Age: 68
End: 2018-06-26
Payer: MEDICARE

## 2018-06-26 PROBLEM — S81.801A LEG WOUND, RIGHT: Status: ACTIVE | Noted: 2018-06-26

## 2018-06-27 ENCOUNTER — HOME CARE VISIT (OUTPATIENT)
Dept: SCHEDULING | Facility: HOME HEALTH | Age: 68
End: 2018-06-27
Payer: MEDICARE

## 2018-06-27 VITALS
DIASTOLIC BLOOD PRESSURE: 76 MMHG | SYSTOLIC BLOOD PRESSURE: 142 MMHG | OXYGEN SATURATION: 94 % | RESPIRATION RATE: 18 BRPM | TEMPERATURE: 98.2 F | HEART RATE: 79 BPM

## 2018-06-27 PROCEDURE — 3331090001 HH PPS REVENUE CREDIT

## 2018-06-27 PROCEDURE — G0299 HHS/HOSPICE OF RN EA 15 MIN: HCPCS

## 2018-06-27 PROCEDURE — 3331090002 HH PPS REVENUE DEBIT

## 2018-06-27 PROCEDURE — 400013 HH SOC

## 2018-06-28 PROCEDURE — 3331090001 HH PPS REVENUE CREDIT

## 2018-06-28 PROCEDURE — 3331090002 HH PPS REVENUE DEBIT

## 2018-06-29 ENCOUNTER — HOME CARE VISIT (OUTPATIENT)
Dept: SCHEDULING | Facility: HOME HEALTH | Age: 68
End: 2018-06-29
Payer: MEDICARE

## 2018-06-29 VITALS
TEMPERATURE: 98.2 F | RESPIRATION RATE: 18 BRPM | HEART RATE: 84 BPM | SYSTOLIC BLOOD PRESSURE: 126 MMHG | OXYGEN SATURATION: 98 % | DIASTOLIC BLOOD PRESSURE: 64 MMHG

## 2018-06-29 PROCEDURE — G0299 HHS/HOSPICE OF RN EA 15 MIN: HCPCS

## 2018-06-29 PROCEDURE — 3331090001 HH PPS REVENUE CREDIT

## 2018-06-29 PROCEDURE — 3331090002 HH PPS REVENUE DEBIT

## 2018-06-30 ENCOUNTER — HOME CARE VISIT (OUTPATIENT)
Dept: SCHEDULING | Facility: HOME HEALTH | Age: 68
End: 2018-06-30
Payer: MEDICARE

## 2018-06-30 VITALS — TEMPERATURE: 98 F | HEART RATE: 98 BPM | RESPIRATION RATE: 16 BRPM | OXYGEN SATURATION: 99 %

## 2018-06-30 PROCEDURE — 3331090002 HH PPS REVENUE DEBIT

## 2018-06-30 PROCEDURE — G0299 HHS/HOSPICE OF RN EA 15 MIN: HCPCS

## 2018-06-30 PROCEDURE — 3331090001 HH PPS REVENUE CREDIT

## 2018-07-01 PROCEDURE — 3331090002 HH PPS REVENUE DEBIT

## 2018-07-01 PROCEDURE — 3331090001 HH PPS REVENUE CREDIT

## 2018-07-02 ENCOUNTER — HOME CARE VISIT (OUTPATIENT)
Dept: SCHEDULING | Facility: HOME HEALTH | Age: 68
End: 2018-07-02
Payer: MEDICARE

## 2018-07-02 VITALS
TEMPERATURE: 97.9 F | SYSTOLIC BLOOD PRESSURE: 132 MMHG | DIASTOLIC BLOOD PRESSURE: 68 MMHG | OXYGEN SATURATION: 98 % | HEART RATE: 88 BPM | RESPIRATION RATE: 18 BRPM

## 2018-07-02 PROCEDURE — 3331090001 HH PPS REVENUE CREDIT

## 2018-07-02 PROCEDURE — A6222 GAUZE <=16 IN NO W/SAL W/O B: HCPCS

## 2018-07-02 PROCEDURE — A4452 WATERPROOF TAPE: HCPCS

## 2018-07-02 PROCEDURE — 3331090002 HH PPS REVENUE DEBIT

## 2018-07-02 PROCEDURE — A6260 WOUND CLEANSER ANY TYPE/SIZE: HCPCS

## 2018-07-02 PROCEDURE — G0299 HHS/HOSPICE OF RN EA 15 MIN: HCPCS

## 2018-07-03 PROCEDURE — 3331090002 HH PPS REVENUE DEBIT

## 2018-07-03 PROCEDURE — 3331090001 HH PPS REVENUE CREDIT

## 2018-07-04 ENCOUNTER — HOME CARE VISIT (OUTPATIENT)
Dept: SCHEDULING | Facility: HOME HEALTH | Age: 68
End: 2018-07-04
Payer: MEDICARE

## 2018-07-04 PROCEDURE — 3331090001 HH PPS REVENUE CREDIT

## 2018-07-04 PROCEDURE — 3331090002 HH PPS REVENUE DEBIT

## 2018-07-04 PROCEDURE — G0299 HHS/HOSPICE OF RN EA 15 MIN: HCPCS

## 2018-07-05 PROCEDURE — 3331090002 HH PPS REVENUE DEBIT

## 2018-07-05 PROCEDURE — 3331090001 HH PPS REVENUE CREDIT

## 2018-07-06 ENCOUNTER — HOME CARE VISIT (OUTPATIENT)
Dept: SCHEDULING | Facility: HOME HEALTH | Age: 68
End: 2018-07-06
Payer: MEDICARE

## 2018-07-06 PROCEDURE — G0299 HHS/HOSPICE OF RN EA 15 MIN: HCPCS

## 2018-07-06 PROCEDURE — 3331090002 HH PPS REVENUE DEBIT

## 2018-07-06 PROCEDURE — 3331090001 HH PPS REVENUE CREDIT

## 2018-07-07 VITALS
TEMPERATURE: 98 F | RESPIRATION RATE: 18 BRPM | OXYGEN SATURATION: 98 % | HEART RATE: 78 BPM | DIASTOLIC BLOOD PRESSURE: 68 MMHG | SYSTOLIC BLOOD PRESSURE: 122 MMHG

## 2018-07-07 PROCEDURE — 3331090001 HH PPS REVENUE CREDIT

## 2018-07-07 PROCEDURE — 3331090002 HH PPS REVENUE DEBIT

## 2018-07-08 PROCEDURE — 3331090002 HH PPS REVENUE DEBIT

## 2018-07-08 PROCEDURE — 3331090001 HH PPS REVENUE CREDIT

## 2018-07-09 ENCOUNTER — HOME CARE VISIT (OUTPATIENT)
Dept: SCHEDULING | Facility: HOME HEALTH | Age: 68
End: 2018-07-09
Payer: MEDICARE

## 2018-07-09 VITALS
DIASTOLIC BLOOD PRESSURE: 74 MMHG | RESPIRATION RATE: 17 BRPM | OXYGEN SATURATION: 98 % | HEART RATE: 72 BPM | SYSTOLIC BLOOD PRESSURE: 128 MMHG | TEMPERATURE: 97.4 F

## 2018-07-09 VITALS
OXYGEN SATURATION: 98 % | HEART RATE: 82 BPM | RESPIRATION RATE: 18 BRPM | TEMPERATURE: 97.9 F | DIASTOLIC BLOOD PRESSURE: 76 MMHG | SYSTOLIC BLOOD PRESSURE: 126 MMHG

## 2018-07-09 PROCEDURE — 3331090001 HH PPS REVENUE CREDIT

## 2018-07-09 PROCEDURE — G0299 HHS/HOSPICE OF RN EA 15 MIN: HCPCS

## 2018-07-09 PROCEDURE — 3331090002 HH PPS REVENUE DEBIT

## 2018-07-10 PROCEDURE — 3331090002 HH PPS REVENUE DEBIT

## 2018-07-10 PROCEDURE — 3331090001 HH PPS REVENUE CREDIT

## 2018-07-11 ENCOUNTER — HOME CARE VISIT (OUTPATIENT)
Dept: SCHEDULING | Facility: HOME HEALTH | Age: 68
End: 2018-07-11
Payer: MEDICARE

## 2018-07-11 VITALS
OXYGEN SATURATION: 97 % | SYSTOLIC BLOOD PRESSURE: 132 MMHG | HEART RATE: 84 BPM | TEMPERATURE: 98.2 F | RESPIRATION RATE: 16 BRPM | DIASTOLIC BLOOD PRESSURE: 76 MMHG

## 2018-07-11 PROCEDURE — G0299 HHS/HOSPICE OF RN EA 15 MIN: HCPCS

## 2018-07-11 PROCEDURE — 3331090001 HH PPS REVENUE CREDIT

## 2018-07-11 PROCEDURE — 3331090002 HH PPS REVENUE DEBIT

## 2018-07-12 PROCEDURE — 3331090001 HH PPS REVENUE CREDIT

## 2018-07-12 PROCEDURE — 3331090002 HH PPS REVENUE DEBIT

## 2018-07-13 ENCOUNTER — HOME CARE VISIT (OUTPATIENT)
Dept: SCHEDULING | Facility: HOME HEALTH | Age: 68
End: 2018-07-13
Payer: MEDICARE

## 2018-07-13 PROCEDURE — 3331090002 HH PPS REVENUE DEBIT

## 2018-07-13 PROCEDURE — 3331090001 HH PPS REVENUE CREDIT

## 2018-07-13 PROCEDURE — G0299 HHS/HOSPICE OF RN EA 15 MIN: HCPCS

## 2018-07-14 VITALS
TEMPERATURE: 98.2 F | DIASTOLIC BLOOD PRESSURE: 64 MMHG | OXYGEN SATURATION: 98 % | SYSTOLIC BLOOD PRESSURE: 122 MMHG | RESPIRATION RATE: 18 BRPM | HEART RATE: 82 BPM

## 2018-07-14 PROCEDURE — 3331090002 HH PPS REVENUE DEBIT

## 2018-07-14 PROCEDURE — 3331090001 HH PPS REVENUE CREDIT

## 2018-07-15 PROCEDURE — 3331090001 HH PPS REVENUE CREDIT

## 2018-07-15 PROCEDURE — 3331090002 HH PPS REVENUE DEBIT

## 2018-07-16 ENCOUNTER — HOME CARE VISIT (OUTPATIENT)
Dept: SCHEDULING | Facility: HOME HEALTH | Age: 68
End: 2018-07-16
Payer: MEDICARE

## 2018-07-16 VITALS
HEART RATE: 82 BPM | TEMPERATURE: 97.9 F | DIASTOLIC BLOOD PRESSURE: 64 MMHG | OXYGEN SATURATION: 98 % | SYSTOLIC BLOOD PRESSURE: 122 MMHG | RESPIRATION RATE: 18 BRPM

## 2018-07-16 PROCEDURE — G0299 HHS/HOSPICE OF RN EA 15 MIN: HCPCS

## 2018-07-16 PROCEDURE — 3331090002 HH PPS REVENUE DEBIT

## 2018-07-16 PROCEDURE — 3331090001 HH PPS REVENUE CREDIT

## 2018-07-17 PROCEDURE — 3331090002 HH PPS REVENUE DEBIT

## 2018-07-17 PROCEDURE — 3331090001 HH PPS REVENUE CREDIT

## 2018-07-18 ENCOUNTER — HOME CARE VISIT (OUTPATIENT)
Dept: SCHEDULING | Facility: HOME HEALTH | Age: 68
End: 2018-07-18
Payer: MEDICARE

## 2018-07-18 VITALS
HEART RATE: 78 BPM | DIASTOLIC BLOOD PRESSURE: 74 MMHG | SYSTOLIC BLOOD PRESSURE: 136 MMHG | TEMPERATURE: 98.1 F | OXYGEN SATURATION: 98 % | RESPIRATION RATE: 18 BRPM

## 2018-07-18 PROCEDURE — 3331090001 HH PPS REVENUE CREDIT

## 2018-07-18 PROCEDURE — 3331090002 HH PPS REVENUE DEBIT

## 2018-07-18 PROCEDURE — G0299 HHS/HOSPICE OF RN EA 15 MIN: HCPCS

## 2018-07-19 PROCEDURE — 3331090001 HH PPS REVENUE CREDIT

## 2018-07-19 PROCEDURE — 3331090002 HH PPS REVENUE DEBIT

## 2018-07-20 ENCOUNTER — HOME CARE VISIT (OUTPATIENT)
Dept: SCHEDULING | Facility: HOME HEALTH | Age: 68
End: 2018-07-20
Payer: MEDICARE

## 2018-07-20 VITALS
RESPIRATION RATE: 18 BRPM | DIASTOLIC BLOOD PRESSURE: 74 MMHG | TEMPERATURE: 98.2 F | HEART RATE: 88 BPM | OXYGEN SATURATION: 98 % | SYSTOLIC BLOOD PRESSURE: 128 MMHG

## 2018-07-20 PROCEDURE — 3331090001 HH PPS REVENUE CREDIT

## 2018-07-20 PROCEDURE — G0299 HHS/HOSPICE OF RN EA 15 MIN: HCPCS

## 2018-07-20 PROCEDURE — 3331090002 HH PPS REVENUE DEBIT

## 2018-07-21 PROCEDURE — 3331090001 HH PPS REVENUE CREDIT

## 2018-07-21 PROCEDURE — 3331090002 HH PPS REVENUE DEBIT

## 2018-07-22 PROCEDURE — 3331090001 HH PPS REVENUE CREDIT

## 2018-07-22 PROCEDURE — 3331090002 HH PPS REVENUE DEBIT

## 2018-07-23 ENCOUNTER — HOME CARE VISIT (OUTPATIENT)
Dept: SCHEDULING | Facility: HOME HEALTH | Age: 68
End: 2018-07-23
Payer: MEDICARE

## 2018-07-23 VITALS
HEART RATE: 84 BPM | RESPIRATION RATE: 18 BRPM | OXYGEN SATURATION: 98 % | SYSTOLIC BLOOD PRESSURE: 132 MMHG | TEMPERATURE: 97.9 F | DIASTOLIC BLOOD PRESSURE: 78 MMHG

## 2018-07-23 PROCEDURE — 3331090002 HH PPS REVENUE DEBIT

## 2018-07-23 PROCEDURE — 3331090001 HH PPS REVENUE CREDIT

## 2018-07-23 PROCEDURE — G0299 HHS/HOSPICE OF RN EA 15 MIN: HCPCS

## 2018-07-24 ENCOUNTER — PATIENT OUTREACH (OUTPATIENT)
Dept: CASE MANAGEMENT | Age: 68
End: 2018-07-24

## 2018-07-24 PROCEDURE — 3331090001 HH PPS REVENUE CREDIT

## 2018-07-24 PROCEDURE — 3331090002 HH PPS REVENUE DEBIT

## 2018-07-24 NOTE — PROGRESS NOTES
This note will not be viewable in [address] Ref from [facility] adms/ED visit  Infected right thigh AV graft, status post removal.  Pt went to [address] [place] [address] Drive [facility] and it is believed requested dc to home instead of return to facility upon going to [facility] ED. RNCM spoke w/ pt regarding CCM services. Pt w/ hx ESRD and COPD, dc'd from [facility]22[facility] w/  infectd AV graft. Pt lives w/ roommate Ruiz Stanford, and does not drive. Pt has [facility] services. Pt reports Wound vac removed yesterday. Pt has HD at [facility] MWF 1130a, though lives in [place]. Pt expresses frustration w/ this. Pt denies falls.  Med Review - Medications not reviewed today due to Pt requested to end call due to waiting on call from medicaid.  Communication/Intervention  RNCM communicated with [facility] N[facility] Carey. Per Trice Carey, pt home situation is stable except for roaches which pt is having the [facility] address. Pt discussing subsidized housing with her Medicaid . RNCM has requested [facility] dc date and any items that may need following up on by this RNCM. Next Steps: RNCM scheduled call back in 3d to complete assessment and self mgmt for CCM services.

## 2018-07-25 ENCOUNTER — HOME CARE VISIT (OUTPATIENT)
Dept: SCHEDULING | Facility: HOME HEALTH | Age: 68
End: 2018-07-25
Payer: MEDICARE

## 2018-07-25 VITALS
HEART RATE: 87 BPM | SYSTOLIC BLOOD PRESSURE: 136 MMHG | TEMPERATURE: 97.8 F | DIASTOLIC BLOOD PRESSURE: 72 MMHG | RESPIRATION RATE: 18 BRPM | OXYGEN SATURATION: 98 %

## 2018-07-25 PROCEDURE — 3331090001 HH PPS REVENUE CREDIT

## 2018-07-25 PROCEDURE — 3331090002 HH PPS REVENUE DEBIT

## 2018-07-25 PROCEDURE — A6252 ABSORPT DRG >16 <=48 W/O BDR: HCPCS

## 2018-07-25 PROCEDURE — A6222 GAUZE <=16 IN NO W/SAL W/O B: HCPCS

## 2018-07-25 PROCEDURE — A4452 WATERPROOF TAPE: HCPCS

## 2018-07-25 PROCEDURE — G0299 HHS/HOSPICE OF RN EA 15 MIN: HCPCS

## 2018-07-26 PROCEDURE — 3331090002 HH PPS REVENUE DEBIT

## 2018-07-26 PROCEDURE — 3331090001 HH PPS REVENUE CREDIT

## 2018-07-27 ENCOUNTER — HOME CARE VISIT (OUTPATIENT)
Dept: SCHEDULING | Facility: HOME HEALTH | Age: 68
End: 2018-07-27
Payer: MEDICARE

## 2018-07-27 VITALS
HEART RATE: 92 BPM | DIASTOLIC BLOOD PRESSURE: 76 MMHG | OXYGEN SATURATION: 97 % | TEMPERATURE: 97.9 F | SYSTOLIC BLOOD PRESSURE: 134 MMHG | RESPIRATION RATE: 18 BRPM

## 2018-07-27 PROCEDURE — 3331090002 HH PPS REVENUE DEBIT

## 2018-07-27 PROCEDURE — G0299 HHS/HOSPICE OF RN EA 15 MIN: HCPCS

## 2018-07-27 PROCEDURE — 3331090001 HH PPS REVENUE CREDIT

## 2018-07-28 PROCEDURE — 3331090002 HH PPS REVENUE DEBIT

## 2018-07-28 PROCEDURE — 3331090001 HH PPS REVENUE CREDIT

## 2018-07-29 PROCEDURE — 3331090001 HH PPS REVENUE CREDIT

## 2018-07-29 PROCEDURE — 3331090002 HH PPS REVENUE DEBIT

## 2018-07-30 ENCOUNTER — HOME CARE VISIT (OUTPATIENT)
Dept: SCHEDULING | Facility: HOME HEALTH | Age: 68
End: 2018-07-30
Payer: MEDICARE

## 2018-07-30 VITALS
TEMPERATURE: 98.6 F | OXYGEN SATURATION: 98 % | SYSTOLIC BLOOD PRESSURE: 132 MMHG | RESPIRATION RATE: 18 BRPM | HEART RATE: 78 BPM | DIASTOLIC BLOOD PRESSURE: 76 MMHG

## 2018-07-30 PROCEDURE — G0299 HHS/HOSPICE OF RN EA 15 MIN: HCPCS

## 2018-07-30 PROCEDURE — 3331090002 HH PPS REVENUE DEBIT

## 2018-07-30 PROCEDURE — 3331090001 HH PPS REVENUE CREDIT

## 2018-07-31 PROCEDURE — 3331090001 HH PPS REVENUE CREDIT

## 2018-07-31 PROCEDURE — 3331090002 HH PPS REVENUE DEBIT

## 2018-08-01 ENCOUNTER — HOME CARE VISIT (OUTPATIENT)
Dept: SCHEDULING | Facility: HOME HEALTH | Age: 68
End: 2018-08-01
Payer: MEDICARE

## 2018-08-01 VITALS
DIASTOLIC BLOOD PRESSURE: 64 MMHG | RESPIRATION RATE: 18 BRPM | TEMPERATURE: 98.3 F | OXYGEN SATURATION: 98 % | SYSTOLIC BLOOD PRESSURE: 128 MMHG | HEART RATE: 89 BPM

## 2018-08-01 PROCEDURE — G0299 HHS/HOSPICE OF RN EA 15 MIN: HCPCS

## 2018-08-01 PROCEDURE — 3331090001 HH PPS REVENUE CREDIT

## 2018-08-01 PROCEDURE — 3331090002 HH PPS REVENUE DEBIT

## 2018-08-02 ENCOUNTER — PATIENT OUTREACH (OUTPATIENT)
Dept: CASE MANAGEMENT | Age: 68
End: 2018-08-02

## 2018-08-02 PROCEDURE — 3331090001 HH PPS REVENUE CREDIT

## 2018-08-02 PROCEDURE — 3331090002 HH PPS REVENUE DEBIT

## 2018-08-02 NOTE — PROGRESS NOTES
This note will not be viewable in [address] RNCM attempted to reach pt for f/u CCM services. Will continue attempts to reach pt.

## 2018-08-03 ENCOUNTER — HOME CARE VISIT (OUTPATIENT)
Dept: SCHEDULING | Facility: HOME HEALTH | Age: 68
End: 2018-08-03
Payer: MEDICARE

## 2018-08-03 PROCEDURE — 3331090001 HH PPS REVENUE CREDIT

## 2018-08-03 PROCEDURE — G0299 HHS/HOSPICE OF RN EA 15 MIN: HCPCS

## 2018-08-03 PROCEDURE — 3331090002 HH PPS REVENUE DEBIT

## 2018-08-04 VITALS
HEART RATE: 78 BPM | OXYGEN SATURATION: 98 % | RESPIRATION RATE: 18 BRPM | SYSTOLIC BLOOD PRESSURE: 134 MMHG | TEMPERATURE: 98.4 F | DIASTOLIC BLOOD PRESSURE: 68 MMHG

## 2018-08-04 PROCEDURE — 3331090002 HH PPS REVENUE DEBIT

## 2018-08-04 PROCEDURE — 3331090001 HH PPS REVENUE CREDIT

## 2018-08-05 PROCEDURE — 3331090001 HH PPS REVENUE CREDIT

## 2018-08-05 PROCEDURE — 3331090002 HH PPS REVENUE DEBIT

## 2018-08-14 ENCOUNTER — HOSPITAL ENCOUNTER (OUTPATIENT)
Dept: SURGERY | Age: 68
Discharge: HOME | End: 2018-08-14
Payer: MEDICARE

## 2018-08-14 LAB
ANION GAP SERPL CALC-SCNC: 13 MMOL/L (ref 7–16)
BUN SERPL-MCNC: 26 MG/DL (ref 8–23)
CALCIUM SERPL-MCNC: 10.1 MG/DL (ref 8.3–10.4)
CHLORIDE SERPL-SCNC: 100 MMOL/L (ref 98–107)
CO2 SERPL-SCNC: 28 MMOL/L (ref 21–32)
CREAT SERPL-MCNC: 7.92 MG/DL (ref 0.6–1)
GLUCOSE SERPL-MCNC: 90 MG/DL (ref 65–100)
HGB BLD-MCNC: 10.5 G/DL (ref 11.7–15.4)
POTASSIUM SERPL-SCNC: 4.4 MMOL/L (ref 3.5–5.1)
SODIUM SERPL-SCNC: 141 MMOL/L (ref 136–145)

## 2018-08-14 PROCEDURE — 93005 ELECTROCARDIOGRAM TRACING: CPT | Performed by: ANESTHESIOLOGY

## 2018-08-14 PROCEDURE — 80048 BASIC METABOLIC PNL TOTAL CA: CPT

## 2018-08-14 PROCEDURE — 85018 HEMOGLOBIN: CPT

## 2018-08-14 NOTE — PERIOP NOTES
Type 2 surgery,  assessment complete. Labs per surgeon: bmp 18  Labs per anesthesia protocol: hgb 18, poc potassium and type and screen signed and held for DOS. [id number]  Further cardiac records requested from [facility].      Hibiclens and instructions given per hospital policy. Patient provided with and instructed on educational handouts including Guide to Surgery, Pain Management, Hand Hygiene, Blood Transfusion Education, and [facility] Brochure. Patient answered medical/surgical history questions at their best of ability. All prior to admission medications documented in [facility]. Patient instructed to hold all vitamins 7 days prior to surgery and NSAIDS 5 days prior to surgery, patient verbalized understanding. Medications to be held: plavix- see md order for 7 days. Patient instructed to continue previous medications as prescribed prior to surgery and to take the following medications the day of surgery according to anesthesia guidelines with a small sip of water: xanax, apirin, gabapentin, tramadol, sensipar, prilosec. Patient instructed on the following:  Arrive at main Entrance, time of arrival to be called the day before by 1700  NPO after midnight including gum, mints, and ice chips. Responsible adult must drive patient to the hospital, stay during surgery, and patient will require supervision 24 hours after anesthesia. Use hibiclens in shower the night before surgery and on the morning of surgery. Leave all valuables (money and jewelry) at home but bring insurance card and ID on       DOS. Do not wear make-up, nail polish, lotions, cologne, perfumes, powders, or oil on skin. Patient teach back successful and patient demonstrates knowledge of instruction.

## 2018-08-14 NOTE — PERIOP NOTES
Patient complained of right leg dialysis access oozing. 4x4's applied and secured with tape. Patient requested to see a Doctor. Author Alvarado at Dr. Baron Durbin office called. Patient to go to that office after pre-assessment.

## 2018-08-15 LAB
ATRIAL RATE: 75 BPM
CALCULATED P AXIS, ECG09: 65 DEGREES
CALCULATED R AXIS, ECG10: -55 DEGREES
CALCULATED T AXIS, ECG11: 39 DEGREES
DIAGNOSIS, 93000: NORMAL
P-R INTERVAL, ECG05: 200 MS
Q-T INTERVAL, ECG07: 436 MS
QRS DURATION, ECG06: 88 MS
QTC CALCULATION (BEZET), ECG08: 486 MS
VENTRICULAR RATE, ECG03: 75 BPM

## 2018-08-15 NOTE — PERIOP NOTES
Recent Results (from the past 24 hour(s))   METABOLIC PANEL, BASIC    Collection Time: 08/14/18  3:09 PM   Result Value Ref Range    Sodium 141 136 - 145 mmol/L    Potassium 4.4 3.5 - 5.1 mmol/L    Chloride 100 98 - 107 mmol/L    CO2 28 21 - 32 mmol/L    Anion gap 13 7 - 16 mmol/L    Glucose 90 65 - 100 mg/dL    BUN 26 (H) 8 - 23 MG/DL    Creatinine 7.92 (H) 0.6 - 1.0 MG/DL    GFR est AA 6 (L) >60 ml/min/1.73m2    GFR est non-AA 5 (L) >60 ml/min/1.73m2    Calcium 10.1 8.3 - 10.4 MG/DL   HEMOGLOBIN    Collection Time: 08/14/18  3:09 PM   Result Value Ref Range    HGB 10.5 (L) 11.7 - 15.4 g/dL   EKG, 12 LEAD, INITIAL    Collection Time: 08/14/18  3:16 PM   Result Value Ref Range    Ventricular Rate 75 BPM    Atrial Rate 75 BPM    P-R Interval 200 ms    QRS Duration 88 ms    Q-T Interval 436 ms    QTC Calculation (Bezet) 486 ms    Calculated P Axis 65 degrees    Calculated R Axis -55 degrees    Calculated T Axis 39 degrees    Diagnosis       Normal sinus rhythm with sinus arrhythmia  Left anterior fascicular block  Anterior infarct , age undetermined  When compared with ECG of 01-JUN-2018 16:52,  Left anterior fascicular block is now Present  QT has lengthened      abnormal labs for this dialysis patient routed to Dr Juan Jose Sanders per protocol. Awaiting cardiac records from [place].   Ew faxed request for medical records from [facility] records

## 2018-08-15 NOTE — PERIOP NOTES
Records from recent echo and TVAR from 3/2018 on chart , Dr Haim Denny said that due to need of procedure , to please place echo and last office note on chart for anesthesia to review DOS.

## 2018-08-16 ENCOUNTER — PATIENT OUTREACH (OUTPATIENT)
Dept: CASE MANAGEMENT | Age: 68
End: 2018-08-16

## 2018-08-16 NOTE — PROGRESS NOTES
This note will not be viewable in [address] S: Ref from [facility] errol adms/ED visit  Infected right thigh AV graft, status post removal and recent dc from rehab. Pt has HD at [facility] MWF 1130a. RNCM spoke w/ pt regarding CCM services. Pt dc'd from [facility] services on 8/3/18. Pt seen by surgeon yesterday w/ wound reopening reequiring daily dressing changes per note. RNCM called [facility] and [facility]. RNCM then called pt to ask if she has a preference for [place] [address] agency. Pt does not have a preference. RNCM called surgeon office who stated pt reported she would do dressing changes on days HHC would not be there. RNCM requested they send referral back to [facility]. RNCM notified [facility] of [facility] of this plan. Next Steps: RNCM to f/u on care coordination for [place] [address] services.

## 2018-08-20 ENCOUNTER — HOME HEALTH ADMISSION (OUTPATIENT)
Dept: HOME HEALTH SERVICES | Facility: HOME HEALTH | Age: 68
End: 2018-08-20
Payer: MEDICARE

## 2018-08-21 ENCOUNTER — HOME CARE VISIT (OUTPATIENT)
Dept: SCHEDULING | Facility: HOME HEALTH | Age: 68
End: 2018-08-21
Payer: MEDICARE

## 2018-08-21 VITALS
SYSTOLIC BLOOD PRESSURE: 147 MMHG | TEMPERATURE: 98.4 F | HEART RATE: 78 BPM | OXYGEN SATURATION: 96 % | RESPIRATION RATE: 20 BRPM | DIASTOLIC BLOOD PRESSURE: 82 MMHG

## 2018-08-21 PROCEDURE — G0299 HHS/HOSPICE OF RN EA 15 MIN: HCPCS

## 2018-08-21 PROCEDURE — 3331090002 HH PPS REVENUE DEBIT

## 2018-08-21 PROCEDURE — 400013 HH SOC

## 2018-08-21 PROCEDURE — 3331090001 HH PPS REVENUE CREDIT

## 2018-08-22 PROCEDURE — 3331090001 HH PPS REVENUE CREDIT

## 2018-08-22 PROCEDURE — 3331090002 HH PPS REVENUE DEBIT

## 2018-08-23 PROCEDURE — 3331090002 HH PPS REVENUE DEBIT

## 2018-08-23 PROCEDURE — 3331090001 HH PPS REVENUE CREDIT

## 2018-08-24 ENCOUNTER — HOME CARE VISIT (OUTPATIENT)
Dept: SCHEDULING | Facility: HOME HEALTH | Age: 68
End: 2018-08-24
Payer: MEDICARE

## 2018-08-24 VITALS
RESPIRATION RATE: 18 BRPM | SYSTOLIC BLOOD PRESSURE: 132 MMHG | TEMPERATURE: 97.9 F | DIASTOLIC BLOOD PRESSURE: 64 MMHG | OXYGEN SATURATION: 98 % | HEART RATE: 83 BPM

## 2018-08-24 PROCEDURE — G0299 HHS/HOSPICE OF RN EA 15 MIN: HCPCS

## 2018-08-24 PROCEDURE — 3331090002 HH PPS REVENUE DEBIT

## 2018-08-24 PROCEDURE — 3331090001 HH PPS REVENUE CREDIT

## 2018-08-25 PROCEDURE — 3331090001 HH PPS REVENUE CREDIT

## 2018-08-25 PROCEDURE — 3331090002 HH PPS REVENUE DEBIT

## 2018-08-26 PROCEDURE — 3331090002 HH PPS REVENUE DEBIT

## 2018-08-26 PROCEDURE — 3331090001 HH PPS REVENUE CREDIT

## 2018-08-27 ENCOUNTER — HOME CARE VISIT (OUTPATIENT)
Dept: SCHEDULING | Facility: HOME HEALTH | Age: 68
End: 2018-08-27
Payer: MEDICARE

## 2018-08-27 VITALS
RESPIRATION RATE: 18 BRPM | SYSTOLIC BLOOD PRESSURE: 132 MMHG | HEART RATE: 82 BPM | OXYGEN SATURATION: 98 % | TEMPERATURE: 98.2 F | DIASTOLIC BLOOD PRESSURE: 72 MMHG

## 2018-08-27 PROCEDURE — 3331090001 HH PPS REVENUE CREDIT

## 2018-08-27 PROCEDURE — 3331090002 HH PPS REVENUE DEBIT

## 2018-08-27 PROCEDURE — G0299 HHS/HOSPICE OF RN EA 15 MIN: HCPCS

## 2018-08-28 ENCOUNTER — HOSPITAL ENCOUNTER (EMERGENCY)
Age: 68
Discharge: HOME | End: 2018-08-28
Attending: EMERGENCY MEDICINE
Payer: MEDICARE

## 2018-08-28 ENCOUNTER — APPOINTMENT (OUTPATIENT)
Dept: GENERAL RADIOLOGY | Age: 68
End: 2018-08-28
Attending: EMERGENCY MEDICINE
Payer: MEDICARE

## 2018-08-28 ENCOUNTER — APPOINTMENT (OUTPATIENT)
Dept: CT IMAGING | Age: 68
End: 2018-08-28
Attending: EMERGENCY MEDICINE
Payer: MEDICARE

## 2018-08-28 VITALS
TEMPERATURE: 97.8 F | RESPIRATION RATE: 18 BRPM | BODY MASS INDEX: 37.29 KG/M2 | DIASTOLIC BLOOD PRESSURE: 81 MMHG | OXYGEN SATURATION: 95 % | HEART RATE: 69 BPM | HEIGHT: 59 IN | SYSTOLIC BLOOD PRESSURE: 192 MMHG | WEIGHT: 185 LBS

## 2018-08-28 DIAGNOSIS — Z91.15 NONCOMPLIANCE WITH RENAL DIALYSIS (HCC): ICD-10-CM

## 2018-08-28 DIAGNOSIS — E87.5 ACUTE HYPERKALEMIA: Primary | ICD-10-CM

## 2018-08-28 LAB
ALBUMIN SERPL-MCNC: 2.9 G/DL (ref 3.2–4.6)
ALBUMIN/GLOB SERPL: 0.5 {RATIO} (ref 1.2–3.5)
ALP SERPL-CCNC: 110 U/L (ref 50–136)
ALT SERPL-CCNC: 47 U/L (ref 12–65)
ANION GAP SERPL CALC-SCNC: 19 MMOL/L (ref 7–16)
AST SERPL-CCNC: 41 U/L (ref 15–37)
BASOPHILS # BLD: 0 K/UL (ref 0–0.2)
BASOPHILS NFR BLD: 0 % (ref 0–2)
BILIRUB SERPL-MCNC: 0.6 MG/DL (ref 0.2–1.1)
BUN SERPL-MCNC: 77 MG/DL (ref 8–23)
CALCIUM SERPL-MCNC: 9.7 MG/DL (ref 8.3–10.4)
CHLORIDE SERPL-SCNC: 105 MMOL/L (ref 98–107)
CO2 SERPL-SCNC: 17 MMOL/L (ref 21–32)
CREAT SERPL-MCNC: 16.3 MG/DL (ref 0.6–1)
DIFFERENTIAL METHOD BLD: ABNORMAL
EOSINOPHIL # BLD: 0.6 K/UL (ref 0–0.8)
EOSINOPHIL NFR BLD: 8 % (ref 0.5–7.8)
ERYTHROCYTE [DISTWIDTH] IN BLOOD BY AUTOMATED COUNT: 17.5 %
GLOBULIN SER CALC-MCNC: 5.4 G/DL (ref 2.3–3.5)
GLUCOSE SERPL-MCNC: 87 MG/DL (ref 65–100)
HCT VFR BLD AUTO: 35.5 % (ref 35.8–46.3)
HGB BLD-MCNC: 11.1 G/DL (ref 11.7–15.4)
IMM GRANULOCYTES # BLD: 0 K/UL (ref 0–0.5)
IMM GRANULOCYTES NFR BLD AUTO: 0 % (ref 0–5)
LYMPHOCYTES # BLD: 1.1 K/UL (ref 0.5–4.6)
LYMPHOCYTES NFR BLD: 15 % (ref 13–44)
MAGNESIUM SERPL-MCNC: 3.4 MG/DL (ref 1.8–2.4)
MCH RBC QN AUTO: 26.8 PG (ref 26.1–32.9)
MCHC RBC AUTO-ENTMCNC: 31.3 G/DL (ref 31.4–35)
MCV RBC AUTO: 85.7 FL (ref 79.6–97.8)
MONOCYTES # BLD: 0.6 K/UL (ref 0.1–1.3)
MONOCYTES NFR BLD: 8 % (ref 4–12)
NEUTS SEG # BLD: 5 K/UL (ref 1.7–8.2)
NEUTS SEG NFR BLD: 68 % (ref 43–78)
NRBC # BLD: 0 K/UL (ref 0–0.2)
PLATELET # BLD AUTO: 208 K/UL (ref 150–450)
PMV BLD AUTO: 10 FL (ref 9.4–12.3)
POTASSIUM SERPL-SCNC: 6.3 MMOL/L (ref 3.5–5.1)
PROT SERPL-MCNC: 8.3 G/DL (ref 6.3–8.2)
RBC # BLD AUTO: 4.14 M/UL (ref 4.05–5.2)
SODIUM SERPL-SCNC: 141 MMOL/L (ref 136–145)
WBC # BLD AUTO: 7.3 K/UL (ref 4.3–11.1)

## 2018-08-28 PROCEDURE — 74011250636 HC RX REV CODE- 250/636: Performed by: EMERGENCY MEDICINE

## 2018-08-28 PROCEDURE — 74011000258 HC RX REV CODE- 258: Performed by: EMERGENCY MEDICINE

## 2018-08-28 PROCEDURE — 74011000250 HC RX REV CODE- 250: Performed by: EMERGENCY MEDICINE

## 2018-08-28 PROCEDURE — 71045 X-RAY EXAM CHEST 1 VIEW: CPT

## 2018-08-28 PROCEDURE — 70450 CT HEAD/BRAIN W/O DYE: CPT

## 2018-08-28 PROCEDURE — 85025 COMPLETE CBC W/AUTO DIFF WBC: CPT

## 2018-08-28 PROCEDURE — 96365 THER/PROPH/DIAG IV INF INIT: CPT | Performed by: EMERGENCY MEDICINE

## 2018-08-28 PROCEDURE — 96375 TX/PRO/DX INJ NEW DRUG ADDON: CPT | Performed by: EMERGENCY MEDICINE

## 2018-08-28 PROCEDURE — 80053 COMPREHEN METABOLIC PANEL: CPT

## 2018-08-28 PROCEDURE — 83735 ASSAY OF MAGNESIUM: CPT

## 2018-08-28 PROCEDURE — 99285 EMERGENCY DEPT VISIT HI MDM: CPT | Performed by: EMERGENCY MEDICINE

## 2018-08-28 PROCEDURE — 93005 ELECTROCARDIOGRAM TRACING: CPT | Performed by: EMERGENCY MEDICINE

## 2018-08-28 PROCEDURE — 3331090001 HH PPS REVENUE CREDIT

## 2018-08-28 PROCEDURE — 3331090002 HH PPS REVENUE DEBIT

## 2018-08-28 RX ORDER — LABETALOL HYDROCHLORIDE 5 MG/ML
20 INJECTION, SOLUTION INTRAVENOUS
Status: COMPLETED | OUTPATIENT
Start: 2018-08-28 | End: 2018-08-28

## 2018-08-28 RX ORDER — CALCIUM GLUCONATE 94 MG/ML
1 INJECTION, SOLUTION INTRAVENOUS
Status: DISCONTINUED | OUTPATIENT
Start: 2018-08-28 | End: 2018-08-28 | Stop reason: SDUPTHER

## 2018-08-28 RX ADMIN — CALCIUM GLUCONATE 1 G: 98 INJECTION, SOLUTION INTRAVENOUS at 12:00

## 2018-08-28 RX ADMIN — LABETALOL HYDROCHLORIDE 20 MG: 5 INJECTION, SOLUTION INTRAVENOUS at 10:27

## 2018-08-28 NOTE — DISCHARGE INSTRUCTIONS
Hyperkalemia: Care Instructions  Your Care Instructions    Hyperkalemia is too much potassium in the blood. Potassium helps keep the right mix of fluids in your body. It also helps your nerves and muscles work as they should. And it keeps your heartbeat in a normal rhythm. Some things can raise potassium levels. These include some health problems, medicines, and kidney problems. (Normally, your kidneys remove extra potassium.)  Too much potassium can cause nausea. It also can cause a heartbeat that isn't normal. But you may not have any symptoms. Too much potassium can be dangerous. That's why it's important to treat it. If you are taking any of the medicines that can raise your levels, your doctor will ask you to stop. You may get medicines to lower your levels. And you may have to limit or not eat foods that have a lot of potassium. Follow-up care is a key part of your treatment and safety. Be sure to make and go to all appointments, and call your doctor if you are having problems. It's also a good idea to know your test results and keep a list of the medicines you take. How can you care for yourself at home? · Take your medicines exactly as prescribed. Call your doctor if you think you are having a problem with your medicine. · Stop taking certain medicines if your doctor asks you to. They may be causing your high potassium levels. If you have concerns about stopping medicine, talk with your doctor. · If you have kidney, heart, or liver disease and have to limit fluids, talk with your doctor before you increase the amount of fluids you drink. If the doctor says it's okay, drink plenty of fluids. This means drinking enough so that your urine is light yellow or clear like water. · Avoid strenuous exercise until your doctor tells you it is okay. · Potassium is in many foods, including vegetables, fruits, and milk products.  Foods high in potassium include bananas, cantaloupe, broccoli, milk, potatoes, and tomatoes. · Low potassium foods include blueberries, raspberries, cucumber, white or brown rice, spaghetti, and macaroni. · Do not use a salt substitute without talking to your doctor first. Most of these are very high in potassium. · Be sure to tell your doctor about any prescription, over-the-counter, or herbal medicines you take. Some of these can raise potassium. When should you call for help? Call 911 anytime you think you may need emergency care. For example, call if:    · You passed out (lost consciousness).     · You have an unusual heartbeat. Your heart may beat fast or skip beats.    Call your doctor now or seek immediate medical care if:    · You have muscle aches.     · You feel very weak.    Watch closely for changes in your health, and be sure to contact your doctor if:    · You do not get better as expected. Where can you learn more? Go to http://madeline-flash.info/. Enter H155 in the search box to learn more about \"Hyperkalemia: Care Instructions. \"  Current as of: May 12, 2017  Content Version: 11.7  © 7196-2205 [employer]. Care instructions adapted under license by [employer] (which disclaims liability or warranty for this information). If you have questions about a medical condition or this instruction, always ask your healthcare professional. [address] any warranty or liability for your use of this information.

## 2018-08-28 NOTE — ED PROVIDER NOTES
HPI Comments: Patient is a 69-year-old female with end-stage renal disease who is presenting with shortness of breath and headache. She states she's had symptoms for about 2 days. No nausea or vomiting. No chest pain or leg swelling. Patient has not gone to dialysis for the last 6 days. She states she has had some \"transportation issues\". She is concerned she might of had a stroke because multiple family members have had strokes. No fevers, cough, vomiting or nausea. Patient is a 70 y.o. female presenting with headaches. The history is provided by the patient. No  was used. Headache Associated symptoms include shortness of breath. Pertinent negatives include no fever, no nausea and no vomiting. Past Medical History:  
Diagnosis Date  Aortic valve stenosis  Arthritis  CAD (coronary artery disease) TAVR 03/30/2018, KYLE LVEF 65%  Chronic kidney disease   
 ahsan diaysis  Chronic pain  Depression  Dialysis patient [facility])   
 Rocio Simon [phone] mon,wed,fri  GERD (gastroesophageal reflux disease)  Heart failure ([address]  Hyperlipidemia  Hypertension  Infection of AV graft for dialysis ([address] 6/18/2018  Kidney failure  Liver disease   
 hepatitis C  
 Obesity  Other ill-defined conditions(799.89)   
 peripheral neuropathy, Pt states she doesn't have COPD or HTN  
 Peripheral neuropathy  Poor historian  Psychotic disorder  Pulmonary hypertension ([address] Past Surgical History:  
Procedure Laterality Date  HX OTHER SURGICAL    
 access--left leg. axcess placed in both upper arms  HX UROLOGICAL    
 left nephrectomy  VASCULAR SURGERY PROCEDURE UNLIST Right 05/29/2018  
 thigh AVG  VASCULAR SURGERY PROCEDURE UNLIST Right 06/18/2018  
 removal thigh AVG, wound debridement Family History:  
Problem Relation Age of Onset  Heart Disease Mother  Hypertension Mother  Diabetes Mother  Hypertension Father  Stroke Father Social History Social History  Marital status:  Spouse name: N/A  
 Number of children: N/A  
 Years of education: N/A Occupational History  Not on file. Social History Main Topics  Smoking status: Former Smoker Quit date: 56  Smokeless tobacco: Never Used  Alcohol use No  
 Drug use: No  
   Comment: says even if she did she wouldn't tell me  Sexual activity: Not on file Other Topics Concern  Not on file Social History Narrative ALLERGIES: Vancomycin Review of Systems Constitutional: Negative for fatigue and fever. HENT: Negative for sore throat. Respiratory: Positive for shortness of breath. Negative for cough and chest tightness. Cardiovascular: Negative for leg swelling. Gastrointestinal: Negative for abdominal pain, nausea and vomiting. Genitourinary: Negative for dysuria. Musculoskeletal: Negative for back pain. Neurological: Positive for headaches. Negative for syncope. Psychiatric/Behavioral: Negative for confusion. Vitals:  
 08/28/18 1437 BP: (!) 215/98 Pulse: 75 Resp: 18 Temp: 97.8 °F (36.6 °C) SpO2: 95% Weight: 83.9 kg (185 lb) Height: 4' 11\" (1.499 m) Physical Exam  
Constitutional: She is oriented to person, place, and time. She appears well-developed and well-nourished. No distress. HENT:  
Head: Normocephalic and atraumatic. Eyes: Conjunctivae and EOM are normal. Pupils are equal, round, and reactive to light. Neck: Normal range of motion. Neck supple. Cardiovascular: Normal rate, regular rhythm and normal heart sounds. Pulmonary/Chest: Effort normal and breath sounds normal. No respiratory distress. She has no wheezes. She has no rales. Somewhat diminished breath sounds bilaterally. No evidence of respiratory distress Abdominal: Soft. She exhibits no distension. There is no tenderness.  There is no rebound. Musculoskeletal: Normal range of motion. She exhibits no edema or tenderness. Neurological: She is alert and oriented to person, place, and time. Symmetric smile. Clear speech. Good strength in bilateral upper and lower extremities. Skin: Skin is warm and dry. No rash noted. She is not diaphoretic. Psychiatric: She has a normal mood and affect. Her behavior is normal.  
Vitals reviewed. MDM Number of Diagnoses or Management Options Acute hyperkalemia: new and does not require workup Noncompliance with renal dialysis [facility]): new and does not require workup Diagnosis management comments: Patient has no signs or symptoms of stroke. CT negative. Patient is hyperkalemic and in need of dialysis. I discussed case with Dr. Deshawn Toussaint is the nephrologist for [address] [place] [address] nephrology. He states patient has a history of noncompliance. We will arrange ambulance transfer  From the emergency department to dialysis as well as to home after dialysis so patient can be treated. Discharged in stable condition. Return precautions discussed. Janith Koyanagi, MD; 8/28/2018 @5:26 PM Voice dictation software was used during the making of this note. This software is not perfect and grammatical and other typographical errors may be present. This note has not been proofread for errors. 
=================================================================== Amount and/or Complexity of Data Reviewed Clinical lab tests: reviewed and ordered (Results for orders placed or performed during the hospital encounter of 08/28/18 
-CBC WITH AUTOMATED DIFF Result                                            Value                         Ref Range WBC                                               7.3                           4.3 - 11.1 K/uL           
     RBC                                               4.14                          4.05 - 5.2 M/uL HGB                                               11.1 (L)                      11.7 - 15.4 g/dL HCT                                               35.5 (L)                      35.8 - 46.3 % MCV                                               85.7                          79.6 - 97.8 FL            
     MCH                                               26.8                          26.1 - 32.9 PG            
     MCHC                                              31.3 (L)                      31.4 - 35.0 g/dL RDW                                               17.5                          % PLATELET                                          208                           150 - 450 K/uL MPV                                               10.0                          9.4 - 12.3 FL ABSOLUTE NRBC                                     0.00                          0.0 - 0.2 K/uL            
     DF                                                AUTOMATED NEUTROPHILS                                       68                            43 - 78 % LYMPHOCYTES                                       15                            13 - 44 % MONOCYTES                                         8                             4.0 - 12.0 % EOSINOPHILS                                       8 (H)                         0.5 - 7.8 % BASOPHILS                                         0                             0.0 - 2.0 % IMMATURE GRANULOCYTES                             0                             0.0 - 5.0 %               
     ABS. NEUTROPHILS                                  5.0                           1.7 - 8.2 K/UL ABS. LYMPHOCYTES                                  1.1                           0.5 - 4.6 K/UL            
     ABS. MONOCYTES                                    0.6                           0.1 - 1.3 K/UL            
     ABS. EOSINOPHILS                                  0.6                           0.0 - 0.8 K/UL            
     ABS. BASOPHILS                                    0.0                           0.0 - 0.2 K/UL            
     ABS. IMM. GRANS.                                  0.0                           0.0 - 0.5 K/UL            
-MAGNESIUM Result                                            Value                         Ref Range Magnesium                                         3.4 (H)                       1.8 - 2.4 mg/dL           
-METABOLIC PANEL, COMPREHENSIVE Result                                            Value                         Ref Range Sodium                                            141                           136 - 145 mmol/L Potassium                                         6.3 (HH)                      3.5 - 5.1 mmol/L Chloride                                          105                           98 - 107 mmol/L           
     CO2                                               17 (L)                        21 - 32 mmol/L Anion gap                                         19 (H)                        7 - 16 mmol/L Glucose                                           87                            65 - 100 mg/dL BUN                                               77 (H)                        8 - 23 MG/DL Creatinine                                        16.30 (H)                     0.6 - 1.0 MG/DL           
     GFR est AA                                        3 (L)                         >60 ml/min/1.73m2 GFR est non-AA                                    2 (L)                         >60 ml/min/1.73m2 Calcium                                           9.7                           8.3 - 10.4 MG/DL Bilirubin, total                                  0.6                           0.2 - 1.1 MG/DL           
     ALT (SGPT)                                        47                            12 - 65 U/L               
     AST (SGOT)                                        41 (H)                        15 - 37 U/L Alk. phosphatase                                  110                           50 - 136 U/L Protein, total                                    8.3 (H)                       6.3 - 8.2 g/dL Albumin                                           2.9 (L)                       3.2 - 4.6 g/dL Globulin                                          5.4 (H)                       2.3 - 3.5 g/dL A-G Ratio                                         0.5 (L)                       1.2 - 3.5                 
-EKG, 12 LEAD, INITIAL Result                                            Value                         Ref Range Ventricular Rate                                  74                            BPM                       
     Atrial Rate                                       74                            BPM                       
     P-R Interval                                      200                           ms                        
     QRS Duration                                      92                            ms      Q-T Interval                                      428                           ms                        
     QTC Calculation (Bezet)                           475                           ms                        
 Calculated P Axis                                 53                            degrees Calculated R Axis                                 -48                           degrees Calculated T Axis                                 38                            degrees Diagnosis                                                                                                 
 !! AGE AND GENDER SPECIFIC ECG ANALYSIS !! Normal sinus rhythm Left anterior fascicular block Anterior infarct , age undetermined Abnormal ECG No previous ECGs available ) Tests in the radiology section of CPT®: ordered and reviewed (Ct Head Wo Cont Result Date: 8/28/2018 CT scan of the brain 8/28/2018 Scanning was performed within 24 hours of arrival to the facility Comparison: August 31, 2010 Dose reduction techniques used: Automated exposure control, adjustment of the mAs and/or kVp according to patient size, standardized low-dose protocol, and/or iterative reconstruction technique. Indication: Headache for 3 days Findings: The brain parenchyma and ventricular structures are again remarkable for mild subcortical and periventricular white matter lucency. . There is normal white-grey matter differentiation. There are no mass lesions, hemorrhage, or evidence of an acute stroke. The calvarium and the sinuses are unremarkable. Impression: Mild supratentorial microischemia. Note: If a subtle CVA is suspected, MRI would be more definitive if clinically warranted. Xr Chest [facility] Result Date: 8/28/2018 Portable chest x-ray August 28, 2018 INDICATION: Dyspnea COMPARISON: June 1, 2018 Cardiomegaly and mild pulmonary vascular congestion is again noted. There is a right IJ dialysis catheter again noted. There are no interval changes. IMPRESSION: As above, cardiomegaly, mild CHF 
 
) Review and summarize past medical records: yes Discuss the patient with other providers: yes (Guzman) Independent visualization of images, tracings, or specimens: yes Risk of Complications, Morbidity, and/or Mortality Presenting problems: moderate Diagnostic procedures: moderate Management options: moderate Patient Progress Patient progress: stable ED Course Comment By Time Potassium slightly elevated. Calcium gluconate given. Normal EKG. CT head negative. No hard neuro symptoms. I do not believe the patient is having a stroke. I discussed patient with Dr. Gallo Prajapati. He is a nephrologist and has obtains dialysis session for her today and easily. We have scheduled to have her transported by ambulance to dialysis and then to home. Discharge in stable condition. Lilo Kelly MD 08/28 1230 Procedures

## 2018-08-28 NOTE — ED NOTES
I have reviewed discharge instructions with the patient. The patient verbalized understanding. Patient left ED via Discharge Method: stretcher to Dialysis with [facility]. Opportunity for questions and clarification provided. Patient given 0 scripts. No e-sign. To continue your aftercare when you leave the hospital, you may receive an automated call from our care team to check in on how you are doing. This is a free service and part of our promise to provide the best care and service to meet your aftercare needs.  If you have questions, or wish to unsubscribe from this service please call [phone]. Thank you for Choosing our [facility] Emergency Department.

## 2018-08-28 NOTE — ED TRIAGE NOTES
Pt brought in via EMS for headache for 3 days. Non compliant with dialysis. Last run last Thursday. 220/120 for EMS

## 2018-08-29 ENCOUNTER — HOME CARE VISIT (OUTPATIENT)
Dept: SCHEDULING | Facility: HOME HEALTH | Age: 68
End: 2018-08-29
Payer: MEDICARE

## 2018-08-29 ENCOUNTER — PATIENT OUTREACH (OUTPATIENT)
Dept: CASE MANAGEMENT | Age: 68
End: 2018-08-29

## 2018-08-29 VITALS
RESPIRATION RATE: 18 BRPM | DIASTOLIC BLOOD PRESSURE: 76 MMHG | OXYGEN SATURATION: 97 % | HEART RATE: 78 BPM | SYSTOLIC BLOOD PRESSURE: 128 MMHG | TEMPERATURE: 98.4 F

## 2018-08-29 LAB
ATRIAL RATE: 74 BPM
CALCULATED P AXIS, ECG09: 53 DEGREES
CALCULATED R AXIS, ECG10: -48 DEGREES
CALCULATED T AXIS, ECG11: 38 DEGREES
DIAGNOSIS, 93000: NORMAL
P-R INTERVAL, ECG05: 200 MS
Q-T INTERVAL, ECG07: 428 MS
QRS DURATION, ECG06: 92 MS
QTC CALCULATION (BEZET), ECG08: 475 MS
VENTRICULAR RATE, ECG03: 74 BPM

## 2018-08-29 PROCEDURE — 3331090001 HH PPS REVENUE CREDIT

## 2018-08-29 PROCEDURE — 3331090002 HH PPS REVENUE DEBIT

## 2018-08-29 PROCEDURE — G0299 HHS/HOSPICE OF RN EA 15 MIN: HCPCS

## 2018-08-29 NOTE — PROGRESS NOTES
This note will not be viewable in [address] Transition of Care Discharge Follow-up Questionnaire Date/Time of Call: 
  8/29/18        1300 What was the patient hospitalized for? hyperkalemia Does the patient understand his/her diagnosis and/or treatment and what happened during the hospitalization? Yes. Pt missed dialysis Friday 8/24 and Monday 8/27 due to not having transportation to dialysis. RNCM called [facility] and spoke w/ Graciela Galeana who reports stretcher transport has been approved for pt at this time due to pt having a wound. Pt normally uses [facility], however she has been suspended from their services Until Oct 7th due to not cancelling appts. Per Graciela Galeana, pts CLTC Izabel Wade is working on getting an aide to assist w/ transportation. Did the patient receive discharge instructions? Yes Review any discharge instructions (see notes in [facility]). Ask patient if they understand these. Do they have any questions? Pt doesnt understand what meds to take. CM Assessed Risk for Readmission:  
 
Patient stated Risk for Readmission: noncompliance I need to know what medicines to take Were home services ordered (nursing, PT, OT, ST, etc.)? Pt current w/ [facility] If so, has the first visit occurred? If not, why? (Assist with coordination of services if necessary.) Yes Was any DME ordered? No 
  
If so, has it been received? If not, why?  (Assist with coordination of arranging DME orders if necessary. )  
NA Complete a review of all medications (new, continued and discontinued meds per the D/C instructions and medication tab in [facility]). Medications not reviewed w/ pt as she states she has a bag of them but doesnt know what to take. Pt declines appt w/ currently listed PCP and requests new PCP. RNCM emailed [facility] to request new pt appt. Were all new prescriptions filled? If not, why?  (Assist with obtainment of medications if necessary.) Yes Does the patient understand the purpose and dosing instructions for all medications? (If patient has questions, provide explanation and education.) Yes Does the patient have any problems in performing ADLs? (If patient is unable to perform ADLs  what is the limiting factor(s)? Do they have a support system that can assist? If no support system is present, discuss possible assistance that they may be able to obtain.)  Yes, pt has CLTC services. Nataliia Adair is working on increasing pts hours, and getting aide to assist w/ pts transportation. Does the patient have all follow-up appointments scheduled? Has transportation been arranged? [facility] follow-up should be within 7 days of discharge; all others should have PCP follow-up within 7 days of discharge; follow-ups with other specialists as appropriate or ordered.) Pt declines appt w/ PCP listed, and requests a different PCP. She is agreeable at this time to seeing PCP at [facility]. RNCM emailed [facility] grp to request appt. Any other questions or concerns expressed by the patient? Needs to know how to take medications. RNCM called [facility] and spoke w/ primary nurse Long jama. NORY Beavers saw pt in home this am.  RNCM alerted Long jama as to pts concern regarding medications. Schedule next appointment with QUANG WHITESIDE Coordinator or refer to RN Case Manager/  as appropriate. CCM f/u scheduled  in 1wk.    
  
PRAKASH Call Completed By: Zunilda Guerra, RN, BSN, CCM

## 2018-08-30 PROCEDURE — 3331090002 HH PPS REVENUE DEBIT

## 2018-08-30 PROCEDURE — 3331090001 HH PPS REVENUE CREDIT

## 2018-08-31 ENCOUNTER — HOME CARE VISIT (OUTPATIENT)
Dept: SCHEDULING | Facility: HOME HEALTH | Age: 68
End: 2018-08-31
Payer: MEDICARE

## 2018-08-31 VITALS
OXYGEN SATURATION: 98 % | DIASTOLIC BLOOD PRESSURE: 78 MMHG | TEMPERATURE: 98.4 F | HEART RATE: 78 BPM | RESPIRATION RATE: 18 BRPM | SYSTOLIC BLOOD PRESSURE: 158 MMHG

## 2018-08-31 PROCEDURE — G0299 HHS/HOSPICE OF RN EA 15 MIN: HCPCS

## 2018-08-31 PROCEDURE — 3331090002 HH PPS REVENUE DEBIT

## 2018-08-31 PROCEDURE — 3331090001 HH PPS REVENUE CREDIT

## 2018-09-01 PROCEDURE — 3331090001 HH PPS REVENUE CREDIT

## 2018-09-01 PROCEDURE — 3331090002 HH PPS REVENUE DEBIT

## 2018-09-02 PROCEDURE — 3331090002 HH PPS REVENUE DEBIT

## 2018-09-02 PROCEDURE — 3331090001 HH PPS REVENUE CREDIT

## 2018-09-03 PROCEDURE — 3331090001 HH PPS REVENUE CREDIT

## 2018-09-03 PROCEDURE — 3331090002 HH PPS REVENUE DEBIT

## 2018-09-04 ENCOUNTER — PATIENT OUTREACH (OUTPATIENT)
Dept: CASE MANAGEMENT | Age: 68
End: 2018-09-04

## 2018-09-04 NOTE — PROGRESS NOTES
This note will not be viewable in [address] RNCM attempted to reach pt at mobile, no answer. RNCM then call [facility], informed pt is present for HD. [facility] brought pt in today per Grzegorz Heath, unit clerk. RNCM spoke w/ SW Mick Middleton who reports pt's CLTC CW Carlos Enrique Ghosh got more hrs approved and has [facility] agency who's sitter didn't show up yesterday and didn't call into agency that she would not be coming. Carlos Enrique Ghosh has someone else assisgned now and should be bringing pt in for HD runs MWF. RNCM also called [facility]  [address] who reports [facility]PS was called on Friday 8/31. She has not heard outcome of case but is confident case will be opened. Alma Muñiz reports pt's wound has healed, and that pt would like to seek alternate housing as she and her roommate do not have a good relationship. RNCM will continue attempts to reach pt. Alma Muñiz will alert this RNCM of outcome of case.

## 2018-09-05 ENCOUNTER — PATIENT OUTREACH (OUTPATIENT)
Dept: CASE MANAGEMENT | Age: 68
End: 2018-09-05

## 2018-09-05 NOTE — PROGRESS NOTES
This note will not be viewable in [address] S: RNCM spoke w/ pt regarding f/u CCM services, hx ESRD and issues w/ transportation per [employer]. Pt reports she is at dialysis today. Pt reports she received a call from a man  last Friday, per pt she was \"turned into them\" because she wasn't attending dialysis and bc she has bugs. She says she was told he would be coming to visit her home this wk. She believes she was turned in to the  because her daughter wants her declared incompetent so she can get her check. Per [facility], pt was turned into [facility]PS and [facility] has accepted her case. Per pt, [facility]PS is working on getting her another place to live. Pt reports her leg is healed well and she would like to know from Dr. Sudhakar Wagner when she is going to have another shunt placed. RNCM will continue to follow and notify Dr. Jauregui Adjutant office regarding pt's question about future surgery.

## 2018-09-10 ENCOUNTER — PATIENT OUTREACH (OUTPATIENT)
Dept: CASE MANAGEMENT | Age: 68
End: 2018-09-10

## 2018-09-10 NOTE — PROGRESS NOTES
This note will not be viewable in [address] RNCM received call from [facility], [address] at [address] Renal.  Reports pt didn't show for dialysis today d/t CLTC aide not showing up. Per [facility], Pt's CLTC Jazlyn Arciniega is working on getting aide. Sangeetha asking about plans for shunt. RNCM called Dr. Jauregui Adjutant office regarding plans for another shunt now that leg wound is healed, left vm.

## 2018-09-12 ENCOUNTER — HOSPITAL ENCOUNTER (EMERGENCY)
Age: 68
Discharge: HOME | End: 2018-09-12
Attending: EMERGENCY MEDICINE
Payer: MEDICARE

## 2018-09-12 ENCOUNTER — APPOINTMENT (OUTPATIENT)
Dept: GENERAL RADIOLOGY | Age: 68
End: 2018-09-12
Attending: EMERGENCY MEDICINE
Payer: MEDICARE

## 2018-09-12 VITALS
DIASTOLIC BLOOD PRESSURE: 89 MMHG | HEART RATE: 87 BPM | RESPIRATION RATE: 17 BRPM | OXYGEN SATURATION: 99 % | TEMPERATURE: 98 F | SYSTOLIC BLOOD PRESSURE: 175 MMHG

## 2018-09-12 DIAGNOSIS — S60.429A BLISTER OF FINGER, INITIAL ENCOUNTER: Primary | ICD-10-CM

## 2018-09-12 PROCEDURE — 90715 TDAP VACCINE 7 YRS/> IM: CPT | Performed by: PHYSICIAN ASSISTANT

## 2018-09-12 PROCEDURE — 90471 IMMUNIZATION ADMIN: CPT | Performed by: PHYSICIAN ASSISTANT

## 2018-09-12 PROCEDURE — 74011250636 HC RX REV CODE- 250/636: Performed by: PHYSICIAN ASSISTANT

## 2018-09-12 PROCEDURE — 99283 EMERGENCY DEPT VISIT LOW MDM: CPT | Performed by: PHYSICIAN ASSISTANT

## 2018-09-12 RX ORDER — DOXYCYCLINE HYCLATE 100 MG
100 TABLET ORAL 2 TIMES DAILY
Qty: 20 TAB | Refills: 0 | Status: SHIPPED | OUTPATIENT
Start: 2018-09-12 | End: 2018-09-22

## 2018-09-12 RX ADMIN — TETANUS TOXOID, REDUCED DIPHTHERIA TOXOID AND ACELLULAR PERTUSSIS VACCINE, ADSORBED 0.5 ML: 5; 2.5; 8; 8; 2.5 SUSPENSION INTRAMUSCULAR at 17:07

## 2018-09-12 NOTE — ED PROVIDER NOTES
HPI Comments: Patient is here with a blister on her right middle finger that started yesterday. She states it is not painful and she did not injure it. She is not a diabetic. She states she does not have a primary care physician and would like a referral to 1 \"around here. \"  She is not having a fever, swelling to the finger, hand or arm, chest pain, shortness of breath, abdominal pain, dizziness, weakness, dyspnea on exertion, orthopnea or other symptoms. Her caretaker had taken her to dialysis today and when they were pulling in her driveway after dialysis she told her she wanted to come here for the blister on her finger. Patient does use a cane to ambulate. She is right-handed. She states it does not hurt. Patient is a 70 y.o. female presenting with finger pain. The history is provided by the patient. Finger Pain This is a new problem. The current episode started yesterday. The problem occurs constantly. The problem has not changed since onset. The quality of the pain is described as aching. The pain is at a severity of 0/10. The patient is experiencing no pain. Pertinent negatives include no numbness, full range of motion, no stiffness, no tingling, no itching, no back pain and no neck pain. She has tried nothing for the symptoms. There has been no history of extremity trauma. Past Medical History:  
Diagnosis Date  Aortic valve stenosis  Arthritis  CAD (coronary artery disease) TAVR 03/30/2018, KYLE LVEF 65%  Chronic kidney disease   
 ahsan diaysis  Chronic pain  Depression  Dialysis patient [facility])   
 Jorge Erika [phone] mon,wed,fri  GERD (gastroesophageal reflux disease)  Heart failure ([facility]  Hyperlipidemia  Hypertension  Infection of AV graft for dialysis ([facility] 6/18/2018  Kidney failure  Liver disease   
 hepatitis C  
 Obesity  Other ill-defined conditions(799.44) peripheral neuropathy, Pt states she doesn't have COPD or HTN  
 Peripheral neuropathy  Poor historian  Psychotic disorder  Pulmonary hypertension ([facility] Past Surgical History:  
Procedure Laterality Date  HX OTHER SURGICAL    
 access--left leg. axcess placed in both upper arms  HX UROLOGICAL    
 left nephrectomy  VASCULAR SURGERY PROCEDURE UNLIST Right 05/29/2018  
 thigh AVG  VASCULAR SURGERY PROCEDURE UNLIST Right 06/18/2018  
 removal thigh AVG, wound debridement Family History:  
Problem Relation Age of Onset  Heart Disease Mother  Hypertension Mother  Diabetes Mother  Hypertension Father  Stroke Father Social History Social History  Marital status:  Spouse name: N/A  
 Number of children: N/A  
 Years of education: N/A Occupational History  Not on file. Social History Main Topics  Smoking status: Former Smoker Quit date: 56  Smokeless tobacco: Never Used  Alcohol use No  
 Drug use: No  
   Comment: says even if she did she wouldn't tell me  Sexual activity: Not on file Other Topics Concern  Not on file Social History Narrative ALLERGIES: Vancomycin Review of Systems Constitutional: Negative. HENT: Negative. Eyes: Negative. Respiratory: Negative. Cardiovascular: Negative. Gastrointestinal: Negative. Genitourinary: Negative. Musculoskeletal: Negative. Negative for back pain, neck pain and stiffness. Skin: Positive for color change and wound. Negative for itching. Neurological: Negative. Negative for tingling and numbness. Psychiatric/Behavioral: Negative. All other systems reviewed and are negative. Vitals:  
 09/12/18 1607 BP: (!) 182/93 Pulse: 90 Resp: 18 Temp: 97.9 °F (36.6 °C) SpO2: 97% Physical Exam  
Constitutional: She is oriented to person, place, and time.  She appears well-developed and well-nourished. HENT:  
Head: Normocephalic and atraumatic. Right Ear: External ear normal.  
Left Ear: External ear normal.  
Nose: Nose normal.  
Mouth/Throat: Oropharynx is clear and moist.  
Eyes: Conjunctivae and EOM are normal. Pupils are equal, round, and reactive to light. Neck: Normal range of motion. Neck supple. Cardiovascular: Normal rate, regular rhythm, normal heart sounds and intact distal pulses. Pulmonary/Chest: Effort normal and breath sounds normal.  
Abdominal: Soft. Bowel sounds are normal.  
Musculoskeletal: Normal range of motion. Hands: 
Neurological: She is alert and oriented to person, place, and time. She has normal reflexes. Skin: Skin is warm and dry. Psychiatric: She has a normal mood and affect. Her behavior is normal. Judgment and thought content normal.  
Nursing note and vitals reviewed. MDM Number of Diagnoses or Management Options Blister of finger, initial encounter:  
Risk of Complications, Morbidity, and/or Mortality Presenting problems: moderate Diagnostic procedures: moderate Management options: moderate Patient Progress Patient progress: stable ED Course Procedures The patient was observed in the ED. Patient was requesting that the blister be drained today however I told her it is sterile underneath the blister and we need to leave it intact. There is no paronychia that needs to be drained. I did place her on antibiotics as there is some mild surrounding erythema. There is no tenderness. She was instructed on wound care and referred to a new primary care physician across the street since she would like one around here. She should return to the ED if worsening in any way. She was stable for discharge. I discussed the results of all labs, procedures, radiographs, and treatments with the patient and available family. Treatment plan is agreed upon and the patient is ready for discharge.   All voiced understanding of the discharge plan and medication instructions or changes as appropriate. Questions about treatment in the ED were answered. All were encouraged to return should symptoms worsen or new problems develop.

## 2018-09-12 NOTE — PROGRESS NOTES
I have reviewed discharge instructions with the patient. The patient verbalized understanding. Patient left ED via Discharge Method: ambulatory to Home with self Opportunity for questions and clarification provided. Patient given 1 scripts. To continue your aftercare when you leave the hospital, you may receive an automated call from our care team to check in on how you are doing. This is a free service and part of our promise to provide the best care and service to meet your aftercare needs.  If you have questions, or wish to unsubscribe from this service please call [phone]. Thank you for Choosing our [address] [place] [address] Emergency Department. Cab called for patient

## 2018-09-12 NOTE — DISCHARGE INSTRUCTIONS
Blisters on Hand or Foot in Children: Care Instructions  Your Care Instructions  Blisters are fluid-filled bumps that look like bubbles on the skin. Most of the time they're caused by something rubbing against the skin. Sometimes injuries to the skin, such as burns, spider bites, or pinching, can cause a blister. You can treat most blisters at home. A small, unbroken blister on the hand or foot, or even a blood blister, will usually heal on its own. Follow-up care is a key part of your child's treatment and safety. Be sure to make and go to all appointments, and call your doctor if your child is having problems. It's also a good idea to know your child's test results and keep a list of the medicines your child takes. How can you care for your child at home? · If a blister is small and closed, leave it alone. Use a loose bandage to protect it. Have your child avoid the activity that caused the blister. · If a small blister is on a weight-bearing area like the bottom of the foot, protect it with a doughnut-shaped moleskin pad. Leave the area over the blister open. · It's best not to drain a blister at home. But when blisters are painful, some people do drain them. If you do decide to drain a blister, make sure to follow these steps. ¨ Wipe a needle with rubbing alcohol. ¨ Gently puncture the edge of the blister. ¨ Press the fluid in the blister toward the hole so it can drain out. · After you have opened a blister, or if it has torn open:  ¨ Gently wash the area with clean water. Don't use hydrogen peroxide or alcohol, which can slow healing. ¨ Don't remove the flap of skin over a blister unless it's very dirty or torn or there is pus under it. Gently smooth the flap over the tender skin. ¨ You may cover the blister with a thin layer of petroleum jelly, such as Vaseline, and a nonstick bandage. ¨ Apply more petroleum jelly and replace the bandage as needed. When should you call for help?   Call your doctor now or seek immediate medical care if:    · Your child has signs of infection, such as:  ¨ Increased pain, swelling, warmth, or redness. ¨ Red streaks leading from the blister. ¨ Pus draining from the blister. ¨ A fever.    Watch closely for changes in your child's health, and be sure to contact your doctor if:    · Your child does not get better as expected. Where can you learn more? Go to http://madeline-flash.info/. Enter G490 in the search box to learn more about \"Blisters on Hand or Foot in Children: Care Instructions. \"  Current as of: November 20, 2017  Content Version: 11.7  © 2097-3606 [employer]. Care instructions adapted under license by [employer] (which disclaims liability or warranty for this information). If you have questions about a medical condition or this instruction, always ask your healthcare professional. [address] any warranty or liability for your use of this information.

## 2018-09-14 ENCOUNTER — PATIENT OUTREACH (OUTPATIENT)
Dept: CASE MANAGEMENT | Age: 68
End: 2018-09-14

## 2018-09-14 NOTE — PROGRESS NOTES
This note will not be viewable in [address] RNCM spoke w/ pt regarding f/u CCM services. Pt reports [facility] aide didn't showup again today. RNCM called [facility] Teresita Veras with pt on line to explain situation, pt in need of reliable transportation. RNCM also called temporary number provided [phone] (vm full). Per Lillie Neri, pt's aide didn't show up on Monday and pt declined afternoon appt, reports pt went to Wednesday dialysis appt. Pt had appt today w/vasc surg Dr. Kierra Xavier - regarding plans for future shunt,  However aid didn't show up again, appt rescheduled for 9/20/2018 2:15 PM   
 
RNCM emailed Matthew WATTS regarding new PCP appt as f/u to request for new PCP. Pt called elder from [facility] who is going to  her antibiotic for her finger infection. RNCM then received call back from Lillie Neri stating Ms. Lokesh cook did not go to pt's home today because yesterday pt demanded she stay more hours than she is authorized, Lillie Neri is now looking for yet another aide to assist her. Lillie Neri is going to contact Ms. Zeyad Payne to get her side of the story, however she is not sure if pt will have someone to take her to dialysis tomorrow. Lillie Neri reports she has advised her to get counseling due to personality issues. P often  refuses to go to dialysis when transport shows up which is why medicaid Akila Gregorio is not picking her up, suspended her until Oct 7th. Lillie Neri is going to call APS to discuss case. She has this RNCM contact information to advised how this RNCM can assist, as it is thought pt is may be in need of 24hr in the near future, but is still able to make decisions for herself at this time. Next Steps: Pt continues to be at high risk for readmission due to noncompliance, working w/ [facility] for transportation until Oct 7th.

## 2018-09-25 ENCOUNTER — PATIENT OUTREACH (OUTPATIENT)
Dept: CASE MANAGEMENT | Age: 68
End: 2018-09-25

## 2018-09-25 NOTE — PROGRESS NOTES
This note will not be viewable in [address] RNCM spoke w/ pt regarding f/u CCM services. Pt reports she has a new [facility] aide that just started 3 days ago however she has not shown up for today. The medicaid Ruby Abebe started back up 3days ago, making an exception to the original start back date of Oct 7th. Discussed appts pt missed w/ Dr. Shyla Vargas for new pt appt [phone], Dr. Shree Lnacaster [phone]. Pt states she will call [address] to find out if this aide is going to be coming today. She will also call medicaid van to reschedule appts she has missed. RNCM will f/u w/ pt in 2 days regarding these appts as pt w/ hx noncompliance.

## 2018-10-16 RX ORDER — CEFAZOLIN SODIUM/WATER 2 G/20 ML
2 SYRINGE (ML) INTRAVENOUS ONCE
Status: CANCELLED | OUTPATIENT
Start: 2018-10-16 | End: 2018-10-16

## 2018-10-16 RX ORDER — SODIUM CHLORIDE 0.9 % (FLUSH) 0.9 %
5-10 SYRINGE (ML) INJECTION AS NEEDED
Status: CANCELLED | OUTPATIENT
Start: 2018-10-16

## 2018-10-24 ENCOUNTER — PATIENT OUTREACH (OUTPATIENT)
Dept: CASE MANAGEMENT | Age: 68
End: 2018-10-24

## 2018-10-24 NOTE — PROGRESS NOTES
This note will not be viewable in [address] RNCM spoke w/ [facility] Vivas regarding pt presence at dialysis. Spencer Yee reports pt still misses treatments, has missed 3 in October due to \"not wanting to come by choice because hates this place\". Spencer Yee has attempted to get pt transferred to closer facility, but has been unsuccessful due to pt hx noncompliance. RNCM attempted outreach to pt, no answer at this time however pt is currently in dialysis. RNCM to outreach to pt with plans to notify pt  that she has to cancel transportation w/ [employer] ahead of time or pt will be suspended w/ [employer] again. RNCM plans to graduate after next call if no new concerns.

## 2018-10-25 ENCOUNTER — HOSPITAL ENCOUNTER (OUTPATIENT)
Dept: SURGERY | Age: 68
Discharge: HOME | End: 2018-10-25
Payer: MEDICARE

## 2018-10-25 VITALS
BODY MASS INDEX: 42.22 KG/M2 | HEART RATE: 76 BPM | HEIGHT: 55 IN | WEIGHT: 182.44 LBS | OXYGEN SATURATION: 92 % | TEMPERATURE: 97.9 F | RESPIRATION RATE: 16 BRPM

## 2018-10-25 LAB
ANION GAP SERPL CALC-SCNC: 10 MMOL/L (ref 7–16)
BUN SERPL-MCNC: 48 MG/DL (ref 8–23)
CALCIUM SERPL-MCNC: 9.7 MG/DL (ref 8.3–10.4)
CHLORIDE SERPL-SCNC: 100 MMOL/L (ref 98–107)
CO2 SERPL-SCNC: 29 MMOL/L (ref 21–32)
CREAT SERPL-MCNC: 10.3 MG/DL (ref 0.6–1)
ERYTHROCYTE [DISTWIDTH] IN BLOOD BY AUTOMATED COUNT: 14.1 %
GLUCOSE SERPL-MCNC: 97 MG/DL (ref 65–100)
HCT VFR BLD AUTO: 44.1 % (ref 35.8–46.3)
HGB BLD-MCNC: 14 G/DL (ref 11.7–15.4)
MCH RBC QN AUTO: 28 PG (ref 26.1–32.9)
MCHC RBC AUTO-ENTMCNC: 31.7 G/DL (ref 31.4–35)
MCV RBC AUTO: 88.2 FL (ref 79.6–97.8)
NRBC # BLD: 0 K/UL (ref 0–0.2)
PLATELET # BLD AUTO: 123 K/UL (ref 150–450)
PMV BLD AUTO: 11 FL (ref 9.4–12.3)
POTASSIUM SERPL-SCNC: 4.6 MMOL/L (ref 3.5–5.1)
RBC # BLD AUTO: 5 M/UL (ref 4.05–5.2)
SODIUM SERPL-SCNC: 139 MMOL/L (ref 136–145)
WBC # BLD AUTO: 5.8 K/UL (ref 4.3–11.1)

## 2018-10-25 PROCEDURE — 80048 BASIC METABOLIC PNL TOTAL CA: CPT

## 2018-10-25 PROCEDURE — 85027 COMPLETE CBC AUTOMATED: CPT

## 2018-10-25 NOTE — PERIOP NOTES
Recent Results (from the past 24 hour(s)) CBC W/O DIFF Collection Time: 10/25/18  2:14 PM  
Result Value Ref Range WBC 5.8 4.3 - 11.1 K/uL  
 RBC 5.00 4.05 - 5.2 M/uL  
 HGB 14.0 11.7 - 15.4 g/dL HCT 44.1 35.8 - 46.3 % MCV 88.2 79.6 - 97.8 FL  
 MCH 28.0 26.1 - 32.9 PG  
 MCHC 31.7 31.4 - 35.0 g/dL  
 RDW 14.1 % PLATELET 878 (L) 765 - 450 K/uL MPV 11.0 9.4 - 12.3 FL ABSOLUTE NRBC 0.00 0.0 - 0.2 K/uL METABOLIC PANEL, BASIC Collection Time: 10/25/18  2:14 PM  
Result Value Ref Range Sodium 139 136 - 145 mmol/L Potassium 4.6 3.5 - 5.1 mmol/L Chloride 100 98 - 107 mmol/L  
 CO2 29 21 - 32 mmol/L Anion gap 10 7 - 16 mmol/L Glucose 97 65 - 100 mg/dL BUN 48 (H) 8 - 23 MG/DL Creatinine 10.30 (H) 0.6 - 1.0 MG/DL  
 GFR est AA 5 (L) >60 ml/min/1.73m2 GFR est non-AA 4 (L) >60 ml/min/1.73m2  Calcium 9.7 8.3 - 10.4 MG/DL

## 2018-10-25 NOTE — PERIOP NOTES
Nichole Orta , [address] [facility] nurse called and stated that patient was only to be on Plavix for 90 days post [facility] and it was no longer on her medication list from Dr Charlotte Wu. Nichole Orta also said Dr Charlotte Wu was there and would provide cardiac clearance which she faxed and all recent studies and a note requesting pre op antibiotics for this patient which are already ordered per Dr Margo Odonnell.

## 2018-10-25 NOTE — PERIOP NOTES
Patient verified name and . Patient provided medical/health information and PTA medications to the best of their ability. TYPE  CASE:ll 
Order for consent were found in EHR and matches case posting. Labs per surgeon:cbc, bmp. Results: routed, patient with hx or ESRD Labs per anesthesia protocol: poc potassium on arrival.   
EKG  :  ekg from 2018 in care everywhere and note from [address] [place] [address] in 3/2018 as well as echo and ekg and last office note from Dr Terrance Merino. Patient states she has been off plavix for several months and per Dr Burton Gaspar call placed to Dr Terrance Merino at [phone] to make sure this is acceptable with him prior to surgery. Patient also has a lung nodule she has not followed up with per note from Dr Maciel Higgins, Dr Burton Gaspar aware and said this would not interfere with this procedure. Patient may be evaluated DOS for anesthesia ,  after clearance for patient being off plavix for several months cleared by Cardiologist. 
 
Patient provided with and instructed on education handouts including Guide to Surgery, blood transfusions, pain management, and hand hygiene for the family and community, and [facility] brochure. Soap with hibiclens and instructions given per hospital policy. Instructed patient to continue previous medications as prescribed prior to surgery unless otherwise directed and to take the following medications the day of surgery according to anesthesia guidelines : xanax, asa 81 mg, and gabapentin, and omeprazole and tramadol . Instructed patient to hold  the following medications: plavix per order from Dr Nabila Power and cleared by Dr Margaree Carrel at Dr Bill Montes request.. Original medication prescription bottles not visualized during patient appointment. Patient teach back successful and patient demonstrates knowledge of instruction.    
Patient was very confused about TVAR, stating \"I thought they just put in a stent\" patient also thought she did not need to see cardiologist for a yr, note in [facility] stated 3 months in 4/2018. Patient not aware she was to see cardiologist prior to stopping plavix and can not tell me when she stopped her plavix accept to say it had been several months. Patient also stated Dr Artie Mcconnell was her cardiologist and he is a cardiac surgeon. Patient is here today with a home health assistant that has only met her today. Yarsani and states she will receive no blood , chart labeled.

## 2018-10-29 ENCOUNTER — ANESTHESIA EVENT (OUTPATIENT)
Dept: SURGERY | Age: 68
End: 2018-10-29
Payer: MEDICARE

## 2018-10-30 ENCOUNTER — ANESTHESIA (OUTPATIENT)
Dept: SURGERY | Age: 68
End: 2018-10-30
Payer: MEDICARE

## 2018-10-30 ENCOUNTER — HOSPITAL ENCOUNTER (OUTPATIENT)
Age: 68
Setting detail: OBSERVATION
Discharge: HOME | End: 2018-10-31
Attending: SURGERY | Admitting: SURGERY
Payer: MEDICARE

## 2018-10-30 DIAGNOSIS — N18.6 ENCOUNTER REGARDING VASCULAR ACCESS FOR DIALYSIS FOR END-STAGE RENAL DISEASE (HCC): Primary | ICD-10-CM

## 2018-10-30 DIAGNOSIS — Z99.2 ENCOUNTER REGARDING VASCULAR ACCESS FOR DIALYSIS FOR END-STAGE RENAL DISEASE (HCC): Primary | ICD-10-CM

## 2018-10-30 LAB
GLUCOSE BLD STRIP.AUTO-MCNC: 80 MG/DL (ref 65–100)
POTASSIUM BLD-SCNC: 6.1 MMOL/L (ref 3.5–5.1)

## 2018-10-30 PROCEDURE — 76210000016 HC OR PH I REC 1 TO 1.5 HR: Performed by: SURGERY

## 2018-10-30 PROCEDURE — C1768 GRAFT, VASCULAR: HCPCS | Performed by: SURGERY

## 2018-10-30 PROCEDURE — 74011250636 HC RX REV CODE- 250/636: Performed by: ANESTHESIOLOGY

## 2018-10-30 PROCEDURE — 77030002987 HC SUT PROL J&J -B: Performed by: SURGERY

## 2018-10-30 PROCEDURE — 84132 ASSAY OF SERUM POTASSIUM: CPT

## 2018-10-30 PROCEDURE — 99218 HC RM OBSERVATION: CPT

## 2018-10-30 PROCEDURE — 77030037400 HC ADH TISS HI VISC EXOFIN CHMP -B: Performed by: SURGERY

## 2018-10-30 PROCEDURE — 74011250636 HC RX REV CODE- 250/636

## 2018-10-30 PROCEDURE — 77030010512 HC APPL CLP LIG J&J -C: Performed by: SURGERY

## 2018-10-30 PROCEDURE — 77030008703 HC TU ET UNCUF COVD -A: Performed by: ANESTHESIOLOGY

## 2018-10-30 PROCEDURE — 82962 GLUCOSE BLOOD TEST: CPT

## 2018-10-30 PROCEDURE — 77030019908 HC STETH ESOPH SIMS -A: Performed by: ANESTHESIOLOGY

## 2018-10-30 PROCEDURE — 74011250636 HC RX REV CODE- 250/636: Performed by: SURGERY

## 2018-10-30 PROCEDURE — 76060000035 HC ANESTHESIA 2 TO 2.5 HR: Performed by: SURGERY

## 2018-10-30 PROCEDURE — 77030018836 HC SOL IRR NACL ICUM -A: Performed by: SURGERY

## 2018-10-30 PROCEDURE — 77030002996 HC SUT SLK J&J -A: Performed by: SURGERY

## 2018-10-30 PROCEDURE — 74011250637 HC RX REV CODE- 250/637: Performed by: SURGERY

## 2018-10-30 PROCEDURE — 77030003029 HC SUT VCRL J&J -B: Performed by: SURGERY

## 2018-10-30 PROCEDURE — 77030008477 HC STYL SATN SLP COVD -A: Performed by: ANESTHESIOLOGY

## 2018-10-30 PROCEDURE — 77030020782 HC GWN BAIR PAWS FLX 3M -B: Performed by: ANESTHESIOLOGY

## 2018-10-30 PROCEDURE — 77030034888 HC SUT PROL 2 J&J -B: Performed by: SURGERY

## 2018-10-30 PROCEDURE — 74011000250 HC RX REV CODE- 250

## 2018-10-30 PROCEDURE — 77030039425 HC BLD LARYNG TRULITE DISP TELE -A: Performed by: ANESTHESIOLOGY

## 2018-10-30 PROCEDURE — 77030031139 HC SUT VCRL2 J&J -A: Performed by: SURGERY

## 2018-10-30 PROCEDURE — 77030018673: Performed by: SURGERY

## 2018-10-30 PROCEDURE — 76010000222 HC CV SURG 2 TO 2.5 HR INTENSV-TIER 1: Performed by: SURGERY

## 2018-10-30 DEVICE — VG 6MM X 40CM LINED
Type: IMPLANTABLE DEVICE | Site: LEG | Status: FUNCTIONAL
Brand: GORE-TEX   VASCULAR GRAFT

## 2018-10-30 RX ORDER — ROCURONIUM BROMIDE 10 MG/ML
INJECTION, SOLUTION INTRAVENOUS AS NEEDED
Status: DISCONTINUED | OUTPATIENT
Start: 2018-10-30 | End: 2018-10-30 | Stop reason: HOSPADM

## 2018-10-30 RX ORDER — PROPOFOL 10 MG/ML
INJECTION, EMULSION INTRAVENOUS AS NEEDED
Status: DISCONTINUED | OUTPATIENT
Start: 2018-10-30 | End: 2018-10-30 | Stop reason: HOSPADM

## 2018-10-30 RX ORDER — DIPHENHYDRAMINE HYDROCHLORIDE 50 MG/ML
12.5 INJECTION, SOLUTION INTRAMUSCULAR; INTRAVENOUS
Status: DISCONTINUED | OUTPATIENT
Start: 2018-10-30 | End: 2018-10-30 | Stop reason: RX

## 2018-10-30 RX ORDER — SODIUM CHLORIDE 0.9 % (FLUSH) 0.9 %
5-10 SYRINGE (ML) INJECTION AS NEEDED
Status: DISCONTINUED | OUTPATIENT
Start: 2018-10-30 | End: 2018-10-31 | Stop reason: HOSPADM

## 2018-10-30 RX ORDER — OXYCODONE AND ACETAMINOPHEN 10; 325 MG/1; MG/1
1 TABLET ORAL AS NEEDED
Status: DISCONTINUED | OUTPATIENT
Start: 2018-10-30 | End: 2018-10-30 | Stop reason: HOSPADM

## 2018-10-30 RX ORDER — SODIUM CHLORIDE, SODIUM LACTATE, POTASSIUM CHLORIDE, CALCIUM CHLORIDE 600; 310; 30; 20 MG/100ML; MG/100ML; MG/100ML; MG/100ML
75 INJECTION, SOLUTION INTRAVENOUS CONTINUOUS
Status: DISCONTINUED | OUTPATIENT
Start: 2018-10-30 | End: 2018-10-30

## 2018-10-30 RX ORDER — ONDANSETRON 8 MG/1
4 TABLET, ORALLY DISINTEGRATING ORAL
Status: DISCONTINUED | OUTPATIENT
Start: 2018-10-30 | End: 2018-10-31 | Stop reason: HOSPADM

## 2018-10-30 RX ORDER — LIDOCAINE HYDROCHLORIDE 20 MG/ML
INJECTION, SOLUTION EPIDURAL; INFILTRATION; INTRACAUDAL; PERINEURAL AS NEEDED
Status: DISCONTINUED | OUTPATIENT
Start: 2018-10-30 | End: 2018-10-30 | Stop reason: HOSPADM

## 2018-10-30 RX ORDER — ONDANSETRON 2 MG/ML
INJECTION INTRAMUSCULAR; INTRAVENOUS AS NEEDED
Status: DISCONTINUED | OUTPATIENT
Start: 2018-10-30 | End: 2018-10-30 | Stop reason: HOSPADM

## 2018-10-30 RX ORDER — OXYCODONE HYDROCHLORIDE 5 MG/1
5 TABLET ORAL
Status: DISCONTINUED | OUTPATIENT
Start: 2018-10-30 | End: 2018-10-30 | Stop reason: HOSPADM

## 2018-10-30 RX ORDER — CEFAZOLIN SODIUM/WATER 2 G/20 ML
2 SYRINGE (ML) INTRAVENOUS
Status: COMPLETED | OUTPATIENT
Start: 2018-10-30 | End: 2018-10-30

## 2018-10-30 RX ORDER — CINACALCET 30 MG/1
30 TABLET, FILM COATED ORAL
Status: DISCONTINUED | OUTPATIENT
Start: 2018-10-30 | End: 2018-10-31 | Stop reason: HOSPADM

## 2018-10-30 RX ORDER — SODIUM CHLORIDE 0.9 % (FLUSH) 0.9 %
5-10 SYRINGE (ML) INJECTION EVERY 8 HOURS
Status: DISCONTINUED | OUTPATIENT
Start: 2018-10-30 | End: 2018-10-31 | Stop reason: HOSPADM

## 2018-10-30 RX ORDER — SODIUM CHLORIDE 0.9 % (FLUSH) 0.9 %
5-10 SYRINGE (ML) INJECTION AS NEEDED
Status: DISCONTINUED | OUTPATIENT
Start: 2018-10-30 | End: 2018-10-30 | Stop reason: HOSPADM

## 2018-10-30 RX ORDER — HEPARIN SODIUM 5000 [USP'U]/ML
INJECTION, SOLUTION INTRAVENOUS; SUBCUTANEOUS AS NEEDED
Status: DISCONTINUED | OUTPATIENT
Start: 2018-10-30 | End: 2018-10-30 | Stop reason: HOSPADM

## 2018-10-30 RX ORDER — SODIUM CHLORIDE 9 MG/ML
75 INJECTION, SOLUTION INTRAVENOUS CONTINUOUS
Status: DISCONTINUED | OUTPATIENT
Start: 2018-10-30 | End: 2018-10-30

## 2018-10-30 RX ORDER — MIDAZOLAM HYDROCHLORIDE 1 MG/ML
2 INJECTION, SOLUTION INTRAMUSCULAR; INTRAVENOUS ONCE
Status: ACTIVE | OUTPATIENT
Start: 2018-10-30 | End: 2018-10-30

## 2018-10-30 RX ORDER — HEPARIN SODIUM 5000 [USP'U]/ML
5000 INJECTION, SOLUTION INTRAVENOUS; SUBCUTANEOUS EVERY 8 HOURS
Status: DISCONTINUED | OUTPATIENT
Start: 2018-10-30 | End: 2018-10-31 | Stop reason: HOSPADM

## 2018-10-30 RX ORDER — PROTAMINE SULFATE 10 MG/ML
INJECTION, SOLUTION INTRAVENOUS AS NEEDED
Status: DISCONTINUED | OUTPATIENT
Start: 2018-10-30 | End: 2018-10-30 | Stop reason: HOSPADM

## 2018-10-30 RX ORDER — SODIUM CHLORIDE 0.9 % (FLUSH) 0.9 %
5-10 SYRINGE (ML) INJECTION AS NEEDED
Status: DISCONTINUED | OUTPATIENT
Start: 2018-10-30 | End: 2018-10-30

## 2018-10-30 RX ORDER — FENTANYL CITRATE 50 UG/ML
INJECTION, SOLUTION INTRAMUSCULAR; INTRAVENOUS AS NEEDED
Status: DISCONTINUED | OUTPATIENT
Start: 2018-10-30 | End: 2018-10-30 | Stop reason: HOSPADM

## 2018-10-30 RX ORDER — ASPIRIN 81 MG/1
81 TABLET ORAL DAILY
Status: DISCONTINUED | OUTPATIENT
Start: 2018-10-31 | End: 2018-10-31 | Stop reason: HOSPADM

## 2018-10-30 RX ORDER — HYDROCODONE BITARTRATE AND ACETAMINOPHEN 5; 325 MG/1; MG/1
1 TABLET ORAL
Status: DISCONTINUED | OUTPATIENT
Start: 2018-10-30 | End: 2018-10-31 | Stop reason: HOSPADM

## 2018-10-30 RX ORDER — ALPRAZOLAM 0.5 MG/1
0.5 TABLET ORAL AS NEEDED
Status: DISCONTINUED | OUTPATIENT
Start: 2018-10-30 | End: 2018-10-31 | Stop reason: HOSPADM

## 2018-10-30 RX ORDER — PANTOPRAZOLE SODIUM 40 MG/1
40 TABLET, DELAYED RELEASE ORAL
Status: DISCONTINUED | OUTPATIENT
Start: 2018-10-31 | End: 2018-10-31 | Stop reason: HOSPADM

## 2018-10-30 RX ORDER — HYDROMORPHONE HYDROCHLORIDE 2 MG/ML
0.5 INJECTION, SOLUTION INTRAMUSCULAR; INTRAVENOUS; SUBCUTANEOUS
Status: DISCONTINUED | OUTPATIENT
Start: 2018-10-30 | End: 2018-10-30 | Stop reason: HOSPADM

## 2018-10-30 RX ORDER — ACETAMINOPHEN 325 MG/1
650 TABLET ORAL
Status: DISCONTINUED | OUTPATIENT
Start: 2018-10-30 | End: 2018-10-31 | Stop reason: HOSPADM

## 2018-10-30 RX ORDER — SODIUM CHLORIDE 9 MG/ML
INJECTION, SOLUTION INTRAVENOUS
Status: DISCONTINUED | OUTPATIENT
Start: 2018-10-30 | End: 2018-10-30 | Stop reason: HOSPADM

## 2018-10-30 RX ORDER — HEPARIN SODIUM 1000 [USP'U]/ML
INJECTION, SOLUTION INTRAVENOUS; SUBCUTANEOUS AS NEEDED
Status: DISCONTINUED | OUTPATIENT
Start: 2018-10-30 | End: 2018-10-30 | Stop reason: HOSPADM

## 2018-10-30 RX ORDER — MORPHINE SULFATE 2 MG/ML
2 INJECTION, SOLUTION INTRAMUSCULAR; INTRAVENOUS
Status: DISCONTINUED | OUTPATIENT
Start: 2018-10-30 | End: 2018-10-31 | Stop reason: HOSPADM

## 2018-10-30 RX ORDER — LIDOCAINE HYDROCHLORIDE 10 MG/ML
INJECTION INFILTRATION; PERINEURAL AS NEEDED
Status: DISCONTINUED | OUTPATIENT
Start: 2018-10-30 | End: 2018-10-30 | Stop reason: HOSPADM

## 2018-10-30 RX ORDER — SEVELAMER CARBONATE 800 MG/1
800 TABLET, FILM COATED ORAL
Status: DISCONTINUED | OUTPATIENT
Start: 2018-10-30 | End: 2018-10-31 | Stop reason: HOSPADM

## 2018-10-30 RX ORDER — GLYCOPYRROLATE 0.2 MG/ML
INJECTION INTRAMUSCULAR; INTRAVENOUS AS NEEDED
Status: DISCONTINUED | OUTPATIENT
Start: 2018-10-30 | End: 2018-10-30 | Stop reason: HOSPADM

## 2018-10-30 RX ORDER — NEOSTIGMINE METHYLSULFATE 1 MG/ML
INJECTION INTRAVENOUS AS NEEDED
Status: DISCONTINUED | OUTPATIENT
Start: 2018-10-30 | End: 2018-10-30 | Stop reason: HOSPADM

## 2018-10-30 RX ORDER — NALOXONE HYDROCHLORIDE 0.4 MG/ML
0.4 INJECTION, SOLUTION INTRAMUSCULAR; INTRAVENOUS; SUBCUTANEOUS AS NEEDED
Status: DISCONTINUED | OUTPATIENT
Start: 2018-10-30 | End: 2018-10-31 | Stop reason: HOSPADM

## 2018-10-30 RX ADMIN — ROCURONIUM BROMIDE 30 MG: 10 INJECTION, SOLUTION INTRAVENOUS at 12:45

## 2018-10-30 RX ADMIN — Medication 2 G: at 13:00

## 2018-10-30 RX ADMIN — ONDANSETRON 4 MG: 2 INJECTION INTRAMUSCULAR; INTRAVENOUS at 13:10

## 2018-10-30 RX ADMIN — CINACALCET HYDROCHLORIDE 30 MG: 30 TABLET, COATED ORAL at 21:22

## 2018-10-30 RX ADMIN — HEPARIN SODIUM 4000 UNITS: 1000 INJECTION, SOLUTION INTRAVENOUS; SUBCUTANEOUS at 13:38

## 2018-10-30 RX ADMIN — NEOSTIGMINE METHYLSULFATE 2 MG: 1 INJECTION INTRAVENOUS at 14:29

## 2018-10-30 RX ADMIN — FENTANYL CITRATE 25 MCG: 50 INJECTION, SOLUTION INTRAMUSCULAR; INTRAVENOUS at 13:19

## 2018-10-30 RX ADMIN — GLYCOPYRROLATE 0.2 MG: 0.2 INJECTION INTRAMUSCULAR; INTRAVENOUS at 14:29

## 2018-10-30 RX ADMIN — HEPARIN SODIUM 5000 UNITS: 5000 INJECTION INTRAVENOUS; SUBCUTANEOUS at 18:18

## 2018-10-30 RX ADMIN — HYDROMORPHONE HYDROCHLORIDE 0.5 MG: 2 INJECTION, SOLUTION INTRAMUSCULAR; INTRAVENOUS; SUBCUTANEOUS at 15:23

## 2018-10-30 RX ADMIN — FENTANYL CITRATE 50 MCG: 50 INJECTION, SOLUTION INTRAMUSCULAR; INTRAVENOUS at 12:45

## 2018-10-30 RX ADMIN — HYDROMORPHONE HYDROCHLORIDE 0.5 MG: 2 INJECTION, SOLUTION INTRAMUSCULAR; INTRAVENOUS; SUBCUTANEOUS at 15:32

## 2018-10-30 RX ADMIN — Medication 10 ML: at 17:00

## 2018-10-30 RX ADMIN — PROPOFOL 30 MG: 10 INJECTION, EMULSION INTRAVENOUS at 13:10

## 2018-10-30 RX ADMIN — SEVELAMER CARBONATE 800 MG: 800 TABLET, FILM COATED ORAL at 18:18

## 2018-10-30 RX ADMIN — PROPOFOL 100 MG: 10 INJECTION, EMULSION INTRAVENOUS at 12:45

## 2018-10-30 RX ADMIN — SODIUM CHLORIDE: 9 INJECTION, SOLUTION INTRAVENOUS at 12:37

## 2018-10-30 RX ADMIN — PROTAMINE SULFATE 30 MG: 10 INJECTION, SOLUTION INTRAVENOUS at 14:19

## 2018-10-30 RX ADMIN — FENTANYL CITRATE 25 MCG: 50 INJECTION, SOLUTION INTRAMUSCULAR; INTRAVENOUS at 13:22

## 2018-10-30 RX ADMIN — LIDOCAINE HYDROCHLORIDE 50 MG: 20 INJECTION, SOLUTION EPIDURAL; INFILTRATION; INTRACAUDAL; PERINEURAL at 12:45

## 2018-10-30 RX ADMIN — SODIUM CHLORIDE 75 ML/HR: 900 INJECTION, SOLUTION INTRAVENOUS at 12:16

## 2018-10-30 NOTE — PROGRESS NOTES
No dressing in place on HD cath R subclavian. Applied sterile dressing with biopatch. Patient tolerated well. Both lumens are clamped at this time.

## 2018-10-30 NOTE — ANESTHESIA POSTPROCEDURE EVALUATION
Procedure(s): ARTERIO VENOUS GRAFT RIGHT THIGH. Anesthesia Post Evaluation Multimodal analgesia: multimodal analgesia used between 6 hours prior to anesthesia start to PACU discharge Patient location during evaluation: PACU Patient participation: complete - patient participated Level of consciousness: awake and alert Pain management: adequate Airway patency: patent Anesthetic complications: no 
Cardiovascular status: acceptable Respiratory status: acceptable Hydration status: acceptable Comments: Nausea controlled Visit Vitals /58 Pulse 75 Temp 36.8 °C (98.3 °F) Resp 16 Wt 82.6 kg (182 lb) SpO2 90% BMI 42.30 kg/m²

## 2018-10-30 NOTE — PERIOP NOTES
This nurse offered to have patient valuables locked up with security and patient stated just to put everything in her blue belongings bag and have it waiting in the recovery room for her when she comes out of surgery. Patient does not have anyone present with her in the hospital and states she will not have anyone at her home to care for her over the next 24 hours after anesthesia. Dr. Aisha Johnson and Dr. Jie Bernstein notified and Dr. Aisha Johnson spoke with patient and informed her she would be staying overnight and would be discharged tomorrow.

## 2018-10-30 NOTE — BRIEF OP NOTE
BRIEF OPERATIVE NOTE Date of Procedure: 10/30/2018 Preoperative Diagnosis: End stage renal disease ([facility] [N18.6] Postoperative Diagnosis: End stage renal disease ([facility] [address] [N18.6] Procedure(s): ARTERIO VENOUS GRAFT RIGHT THIGH Surgeon(s) and Role: Bernabe Davidson, Leopold Gulling, MD - Primary Surgical Assistant:  
 
Surgical Staff: 
Circ-1: Linda Hansen Circ-2: Kaylie Edmond RN 
Circ-Relief: Ernst Chiu RN Scrub Tech-1: Devi Meza Scrub Tech-2: Saintclair Durand Event Time In Time Out Incision Start 1315 Incision Close 1434 Anesthesia: General  
Estimated Blood Loss: 25cc Specimens: * No specimens in log * Findings:   
Complications: None Implants:  
Implant Name Type Inv. Item Serial No.  Lot No. LRB No. Used Action GRAFT VASC N-S STD [id number] -- [id number] - [id number]  GRAFT VASC N-S STD [id number] -- Reese Hightower [id number]  [employer]  Right 1 Implanted

## 2018-10-30 NOTE — PROGRESS NOTES
TRANSFER - IN REPORT: 
 
Verbal report received from Henry Vazquez on [employer]  being received from PACU for routine post - op Report consisted of patients Situation, Background, Assessment and  
Recommendations(SBAR). Information from the following report(s) SBAR, Kardex, OR Summary, Procedure Summary, Intake/Output and Recent Results was reviewed with the receiving nurse. Opportunity for questions and clarification was provided. Dual RN skin assessment completed upon arrival to [facility]. Patient has two R thigh surgical incisions with nonadherent dressing which are clean/dry/intact. No skin breakdown is noted on sacrum. No wounds noted elsewhere. Assessment completed upon patients arrival to unit and care assumed.

## 2018-10-30 NOTE — PERIOP NOTES
TRANSFER - OUT REPORT: 
 
Verbal report given to Sonam RN on [employer]  being transferred to [phone] for routine post - op Report consisted of patients Situation, Background, Assessment and  
Recommendations(SBAR). Information from the following report(s) SBAR, OR Summary, Procedure Summary, Intake/Output and MAR was reviewed with the receiving nurse. Lines:    
 
Opportunity for questions and clarification was provided. Patient transported with: 
 O2 @ 3 liters VTE prophylaxis orders have been written for [employer]. Patient and family given floor number and nurses name. Family updated re: pt status after security code verified.

## 2018-10-30 NOTE — ANESTHESIA PREPROCEDURE EVALUATION
Anesthetic History No history of anesthetic complications Review of Systems / Medical History Patient summary reviewed and pertinent labs reviewed Pulmonary COPD: mild Neuro/Psych Within defined limits Cardiovascular Hypertension: well controlled CHF: NYHA Classification III 
 
CAD Exercise tolerance: >4 METS Comments: TAVR 4-2018 GI/Hepatic/Renal 
  
GERD: well controlled Hepatitis: type C Renal disease: ESRD and dialysis Endo/Other Morbid obesity Other Findings Physical Exam 
 
Airway Mallampati: II 
TM Distance: 4 - 6 cm Neck ROM: normal range of motion Mouth opening: Normal 
 
 Cardiovascular Regular rate and rhythm,  S1 and S2 normal,  no murmur, click, rub, or gallop Dental 
 
 
  
Pulmonary Breath sounds clear to auscultation Abdominal 
GI exam deferred Other Findings Anesthetic Plan ASA: 3 Anesthesia type: general 
 
 
 
 
Induction: Intravenous Anesthetic plan and risks discussed with: Patient

## 2018-10-30 NOTE — H&P
[address], 187 [facility]. 404 [facility] FAX: [phone] Ranjith [address] 1950 History of Present Illness:  
Patient follows up today for evaluation of her right thigh wound. She was seen last week in the previously open right thigh wound was completely closed with no drainage or erythema. She was scheduled for a second right thigh AV graft to be done next week. She presented for preoperative assessment today and was noted to have purulent appearing drainage from a small opening in the midportion of the wound. She denies fever chills or other signs of systemic infection. 
  
Physical Examination:  
   
Vitals:  
  08/14/18 1619 BP: 162/82 BP 1 Location: Left arm BP Patient Position: Sitting Pulse: 71 Weight: 185 lb (83.9 kg) Height: 4' 11\" (1.499 m)  
  
  
Constitutional: she appears well-developed. No distress. HENT:  
Head: Atraumatic. Eyes: Pupils are equal, round, and reactive to light. Neck: Normal range of motion. Cardiovascular: Regular rhythm. Pulmonary/Chest: Effort normal and breath sounds normal. No respiratory distress. Abdominal: Soft. Bowel sounds are normal. she exhibits no distension. There is no tenderness. There is no guarding. No hernia. Musculoskeletal: Normal range of motion. Neurological: She is alert. CN II- XII grossly intact The right thigh wound is healed with exception of a very small opening in the midportion. This probes to about 3 cm. There was purulent appearing material although it was not malodorous. 
  
Imaging: 
  
  
  
  
Recommendations/Plans: Ms. Bowden [address] is a 70y.o. year old female status post removal of infected right thigh AV graft. The wound appeared to have healed however it has opened and is draining small amount of purulent material.  There is about a 3 cm pocket present in the subcutaneous tissues.   We are going to plan to pack this with iodoform and have home health change the dressing every day and irrigate the small cavity. I will see her back in the office on Monday with Robert Wade to evaluate the progress of this wound. We will plan to leave her scheduled AV graft on schedule for now pending how the wound progresses in the next week. 
  
 
10/30/18 -H&P is current. Physical exam unchanged. I have discussed the need for the procedure with the patient. I have discussed the procedure with the patient/family in detail today including but not limited to the risk of death, bleeding requiring transfusion, infection, limb loss, or the necessity of subsequent procedures. All questions were answered. He understands and agree to proceed. Mel for R thigh AVG today.  
 
MD Kayce Estrada MD

## 2018-10-30 NOTE — ANESTHESIA PREPROCEDURE EVALUATION
Anesthesia EvaluationAnesthetic History No history of anesthetic complications Review of Systems / Medical History Patient summary reviewed and pertinent labs reviewed Pulmonary COPD: mild Neuro/Psych Within defined limits Cardiovascular Hypertension: well controlled CHF: NYHA Classification III 
 
CAD Exercise tolerance: >4 METS Comments: TAVR 4-2018 GI/Hepatic/Renal 
  
GERD: well controlled Hepatitis: type C Renal disease: ESRD and dialysis Endo/Other Morbid obesity Other Findings Physical Exam 
 
Airway Mallampati: II 
TM Distance: 4 - 6 cm Neck ROM: normal range of motion Mouth opening: Normal 
 
 Cardiovascular Regular rate and rhythm,  S1 and S2 normal,  no murmur, click, rub, or gallop Dental 
 
 
  
Pulmonary Breath sounds clear to auscultation Abdominal 
GI exam deferred Other Findings Anesthetic Plan ASA: 4, emergent Anesthesia type: general 
 
 
 
 
Induction: Intravenous Anesthetic plan and risks discussed with: Patient Anesthesia Plan

## 2018-10-31 VITALS
BODY MASS INDEX: 44.32 KG/M2 | RESPIRATION RATE: 16 BRPM | OXYGEN SATURATION: 94 % | DIASTOLIC BLOOD PRESSURE: 63 MMHG | TEMPERATURE: 97.4 F | WEIGHT: 190.7 LBS | HEART RATE: 94 BPM | SYSTOLIC BLOOD PRESSURE: 137 MMHG

## 2018-10-31 PROBLEM — Z99.2 ENCOUNTER REGARDING VASCULAR ACCESS FOR DIALYSIS FOR END-STAGE RENAL DISEASE (HCC): Status: ACTIVE | Noted: 2018-10-30

## 2018-10-31 LAB
ERYTHROCYTE [DISTWIDTH] IN BLOOD BY AUTOMATED COUNT: 13.3 %
HCT VFR BLD AUTO: 38.4 % (ref 35.8–46.3)
HGB BLD-MCNC: 12.1 G/DL (ref 11.7–15.4)
MCH RBC QN AUTO: 27.6 PG (ref 26.1–32.9)
MCHC RBC AUTO-ENTMCNC: 31.5 G/DL (ref 31.4–35)
MCV RBC AUTO: 87.7 FL (ref 79.6–97.8)
NRBC # BLD: 0 K/UL (ref 0–0.2)
PLATELET # BLD AUTO: 122 K/UL (ref 150–450)
PMV BLD AUTO: 10.2 FL (ref 9.4–12.3)
RBC # BLD AUTO: 4.38 M/UL (ref 4.05–5.2)
WBC # BLD AUTO: 5.7 K/UL (ref 4.3–11.1)

## 2018-10-31 PROCEDURE — 36415 COLL VENOUS BLD VENIPUNCTURE: CPT

## 2018-10-31 PROCEDURE — 90935 HEMODIALYSIS ONE EVALUATION: CPT

## 2018-10-31 PROCEDURE — 74011250636 HC RX REV CODE- 250/636: Performed by: SURGERY

## 2018-10-31 PROCEDURE — 74011250637 HC RX REV CODE- 250/637: Performed by: SURGERY

## 2018-10-31 PROCEDURE — 99218 HC RM OBSERVATION: CPT

## 2018-10-31 PROCEDURE — 85027 COMPLETE CBC AUTOMATED: CPT

## 2018-10-31 RX ORDER — HYDROCODONE BITARTRATE AND ACETAMINOPHEN 5; 325 MG/1; MG/1
1 TABLET ORAL
Qty: 30 TAB | Refills: 0 | Status: SHIPPED | OUTPATIENT
Start: 2018-10-31 | End: 2018-11-29

## 2018-10-31 RX ADMIN — PANTOPRAZOLE SODIUM 40 MG: 40 TABLET, DELAYED RELEASE ORAL at 05:15

## 2018-10-31 RX ADMIN — HEPARIN SODIUM 5000 UNITS: 5000 INJECTION INTRAVENOUS; SUBCUTANEOUS at 00:04

## 2018-10-31 RX ADMIN — HYDROCODONE BITARTRATE AND ACETAMINOPHEN 1 TABLET: 5; 325 TABLET ORAL at 00:04

## 2018-10-31 RX ADMIN — ASPIRIN 81 MG: 81 TABLET, COATED ORAL at 08:48

## 2018-10-31 RX ADMIN — HEPARIN SODIUM 5000 UNITS: 5000 INJECTION INTRAVENOUS; SUBCUTANEOUS at 08:49

## 2018-10-31 NOTE — DIALYSIS
TRANSFER OUT- DIALYSIS Hemodialysis treatment completed without complications. Patient alert and VS stable  /48  P 87   
 
 0.5 Kgs removed. Flushed both ports with 10 mL of NS.  CVC dressing clean, dry, and intact, tego caps intact, bilateral lumens wrapped with 4x4 gauze. Patient to [phone] after dialysis.

## 2018-10-31 NOTE — DIALYSIS
TRANSFER IN - DIALYSIS Received patient in dialysis unit  from [facility] (unit) for ordered procedure. Consent verified for renal replacement therapy. Patient alert and vital signs stable. /42 P82 Hemodialysis initiated using right perm catheter. Aspirated and flushed both ports without difficulty. Dressing clean, dry and intact. Machine settings per MD order. Will monitor during treatment.

## 2018-10-31 NOTE — PROGRESS NOTES
Discharge instructions, follow up appointments and prescriptions reviewed with patient and [address] friend. Both verbalize understanding. All personal belongings taken with patient. [employer] member will drive patient home. Patient escorted to discharge area via wheelchair. Patient is stable at discharge.

## 2018-10-31 NOTE — PROGRESS NOTES
Tolerated HD to completion, now sitting up eating lunch. D/C home Remove thigh dressings tomorrow F/u in office in 1mo.

## 2018-10-31 NOTE — PROGRESS NOTES
Dual nurse skin performed upon return from dialysis. No areas of redness or breakdown noted to sacrum.

## 2018-10-31 NOTE — PROGRESS NOTES
Medicare Outpatient Observation Notice provided to the patient. Oral explanation was provided and all questions answered. Signed document placed in the medical record.

## 2018-10-31 NOTE — PROGRESS NOTES
Care Management Interventions PCP Verified by CM: No(followed at dialysis clinic) Palliative Care Criteria Met (RRAT>21 & CHF Dx)?: No(RRRAT 23 Dx S/P Right thigh access) Mode of Transport at Discharge: Other (see comment)(Mr Ismael Ferrera is to transport patient home this evening.) Transition of Care Consult (CM Consult): Discharge Planning Discharge Durable Medical Equipment: No 
Physical Therapy Consult: No 
Occupational Therapy Consult: No 
Speech Therapy Consult: No 
Current Support Network: Lives Alone Confirm Follow Up Transport: Other (see comment)(Medicaid Roslyn Perera or [employer]" who is a gentleman from her [employer]) Plan discussed with Pt/Family/Caregiver: Yes Freedom of Choice Offered: Yes Discharge Location Discharge Placement: Home Patient ready for d/c per physician. Patient alert and oriented to person and place not time. She states it takes her a few minutes to think what she wants to say. Patient lives alone and states she has a young lady that comes everyday to assist her. She requested we call Brother Ismael Ferrera who stated he goes to the same [employer] and that he will pick her up at [phone] and CM called him and he stated he would come in approximately an hour to pick her up. He states he looks after her. He states at times she gets confused but is able to do for herself. She has dialysis catheter in her Right neck. Nursing notified [facility] of patients confusion at time and per nursing they were told this is her baseline. Patient states she takes the Medicaid van to the dialysis on M-W-F. Patient has a quad cane to use as needed. Patient has Medicare and Medicaid for insurance and is able to obtain her medications. Per nursing patient has been up in her room ambulating after dialysis. Patient to d/c home with friend Edinson Hwang"

## 2018-10-31 NOTE — DISCHARGE INSTRUCTIONS
MD Instructions:  Wound care:  - Remove bandages from right thigh on Thursday, 11/1/2018. - Wash leg wounds daily with liquid Dial soap (or other liquid antibacterial soap); use fingers or soft washcloth gently then pat area dry. - Keep incision clean and dry, may cover with gauze. - Do not apply lotions, creams or ointments to incisions. Diet:  - As tolerated before surgery. Activity:  - Don't overdo your activity throughout the day for the next 10-14 days - no prolonged standing, no lifting more than 10lb. - Keep legs propped up when not walking.  - No driving while taking narcotics. - Do not drink alcohol while taking narcotics. - May shower - no tub baths, soaking or swimming. Medications:  - Use prescription pain medications if needed; if you do not wish to use narcotic pain medication or require less pain control, you may take acetaminophen (Tylenol, for example) following the instructions on the label.  - Resume other home medications. Follow up in the office with Dr. Ester Gage on 11/29/2018 at 9:45 AM.    If problems or questions arise, please call our office at [phone]. DISCHARGE SUMMARY from Nurse    PATIENT INSTRUCTIONS:    After general anesthesia or intravenous sedation, for 24 hours or while taking prescription Narcotics:  · Limit your activities  · Do not drive and operate hazardous machinery  · Do not make important personal or business decisions  · Do  not drink alcoholic beverages  · If you have not urinated within 8 hours after discharge, please contact your surgeon on call.     Report the following to your surgeon:  · Excessive pain, swelling, redness or odor of or around the surgical area  · Temperature over 100.5  · Nausea and vomiting lasting longer than 4 hours or if unable to take medications  · Any signs of decreased circulation or nerve impairment to extremity: change in color, persistent  numbness, tingling, coldness or increase pain  · Any questions        *  Please give a list of your current medications to your Primary Care Provider. *  Please update this list whenever your medications are discontinued, doses are      changed, or new medications (including over-the-counter products) are added. *  Please carry medication information at all times in case of emergency situations. These are general instructions for a healthy lifestyle:    No smoking/ No tobacco products/ Avoid exposure to second hand smoke  Surgeon General's Warning:  Quitting smoking now greatly reduces serious risk to your health. Obesity, smoking, and sedentary lifestyle greatly increases your risk for illness    A healthy diet, regular physical exercise & weight monitoring are important for maintaining a healthy lifestyle    You may be retaining fluid if you have a history of heart failure or if you experience any of the following symptoms:  Weight gain of 3 pounds or more overnight or 5 pounds in a week, increased swelling in our hands or feet or shortness of breath while lying flat in bed. Please call your doctor as soon as you notice any of these symptoms; do not wait until your next office visit. Recognize signs and symptoms of STROKE:    F-face looks uneven    A-arms unable to move or move unevenly    S-speech slurred or non-existent    T-time-call 911 as soon as signs and symptoms begin-DO NOT go       Back to bed or wait to see if you get better-TIME IS BRAIN. Warning Signs of HEART ATTACK     Call 911 if you have these symptoms:   Chest discomfort. Most heart attacks involve discomfort in the center of the chest that lasts more than a few minutes, or that goes away and comes back. It can feel like uncomfortable pressure, squeezing, fullness, or pain.  Discomfort in other areas of the upper body. Symptoms can include pain or discomfort in one or both arms, the back, neck, jaw, or stomach.  Shortness of breath with or without chest discomfort.    Other signs may include breaking out in a cold sweat, nausea, or lightheadedness. Don't wait more than five minutes to call 911 - MINUTES MATTER! Fast action can save your life. Calling 911 is almost always the fastest way to get lifesaving treatment. Emergency Medical Services staff can begin treatment when they arrive -- up to an hour sooner than if someone gets to the hospital by car. The discharge information has been reviewed with the patient. The patient verbalized understanding. Discharge medications reviewed with the patient and appropriate educational materials and side effects teaching were provided.   ___________________________________________________________________________________________________________________________________

## 2018-10-31 NOTE — PROGRESS NOTES
Bedside shift report received from Caryle Ply, RN (offgoing nurse). Report given to Ericka Romero RN. Vital signs stable. No complaints present. Patient in stable condition.

## 2018-10-31 NOTE — PROGRESS NOTES
Bedside and Verbal [facility]". Report included the following information SBAR, Kardex, Procedure Summary, Intake/Output, MAR and Recent Results.

## 2018-10-31 NOTE — OP NOTES
[address] [address]  MR#: [id number]  : 1950  ACCOUNT #: [de-identified]   DATE OF SERVICE: 10/30/2018    PREOPERATIVE DIAGNOSIS:  End-stage renal disease. POSTOPERATIVE DIAGNOSIS:  End-stage renal disease. PROCEDURE PERFORMED:  Right thigh AV graft. SURGEON:  Romel Roberts MD    ASSISTANT:  None. ANESTHESIA:  General endotracheal.    ESTIMATED BLOOD LOSS:  100 mL. DRAINS:  None. SPECIMENS REMOVED:  None. COMPLICATIONS:  None. IMPLANTS:  See chart. DESCRIPTION OF PROCEDURE:  The patient was brought to operating room and placed on the operating table in supine position. Following adequate general endotracheal anesthesia and a timeout procedure, right thigh was draped and prepped in sterile fashion. A vertical incision was made just below the inguinal crease. The wound was deepened using Bovie to control bleeding. The sartorius muscle was reflected laterally and the SFA and SFE were identified, mobilized and encircled with vessel loops proximally and distally. Next, a 6 mm PTFE graft was tunneled in a loop fashion extending laterally on the thigh using a counter incision. The patient was then systemically heparinized. Then, an end-to-side anastomosis was created with  a SFA with a running 5-0 Prolene suture. Following completion of this anastomosis a clamp was placed on the graft through the counter incision and the loops around the SFA were released. There was minimal bleeding from the anastomotic suture line. Next, the femoral vein was occluded. A longitudinal venotomy was performed. The PTFE graft was then spatulated and sewn in position end-to-side fashion with running 5-0 Prolene suture. Prior to completion of the anastomosis, the native vessels were flushed. The graft was flushed as well. Anastomosis was then completed and flow restored. There was excellent thrill in the graft.   Protamine was administered to reverse heparin effect. Next, hemostasis was achieved and the wounds were closed in layers of Vicryl suture. Sterile dry dressings were applied. Patient was then awakened from anesthesia and transferred to the recovery room in stable condition. The patient tolerated the procedure well. No complications. All needle and sponge counts were reported as correct.       Christofer Miller MD       [address] [place] [address] / Yves Lam  D: 10/30/2018 14:51     T: 10/30/2018 21:32  JOB #: [id number]

## 2018-10-31 NOTE — CONSULTS
Cami Shaw  Admission Date: 10/30/2018             Renal Daily Progress Note: 10/31/2018    The patient's chart is reviewed and the patient is discussed with the staff. Ms. Lawernce Apley is admitted under observation for right thigh AVF graft placement with vascular. We are consulted for ESRD. She dialyzes outpatient [facility], last reported dialysis Monday 10/29. Subjective:     Patient seen and examined on rounds she complains of bilateral arthritic hand pain this morning, she denies any other complaints. Will plan for dialysis today. Current Facility-Administered Medications   Medication Dose Route Frequency    ALPRAZolam (XANAX) tablet 0.5 mg  0.5 mg Oral PRN    aspirin delayed-release tablet 81 mg  81 mg Oral DAILY    cinacalcet (SENSIPAR) tablet 30 mg  30 mg Oral QHS    pantoprazole (PROTONIX) tablet 40 mg  40 mg Oral ACB    sevelamer carbonate (RENVELA) tab 800 mg  800 mg Oral TID WITH MEALS    sodium chloride (NS) flush 5-10 mL  5-10 mL IntraVENous Q8H    sodium chloride (NS) flush 5-10 mL  5-10 mL IntraVENous PRN    naloxone (NARCAN) injection 0.4 mg  0.4 mg IntraVENous PRN    ondansetron (ZOFRAN ODT) tablet 4 mg  4 mg Oral Q4H PRN    heparin (porcine) injection 5,000 Units  5,000 Units SubCUTAneous Q8H    acetaminophen (TYLENOL) tablet 650 mg  650 mg Oral Q4H PRN    HYDROcodone-acetaminophen (NORCO) 5-325 mg per tablet 1 Tab  1 Tab Oral Q4H PRN    morphine injection 2 mg  2 mg IntraVENous Q3H PRN         Objective:     Vitals:    10/30/18 2253 10/31/18 0321 10/31/18 0441 10/31/18 0658   BP: 118/56 117/58  133/58   Pulse: 79 83  79   Resp: 18 18 18   Temp: 98.2 °F (36.8 °C) 98 °F (36.7 °C)  98.7 °F (37.1 °C)   SpO2: 96% 95%  93%   Weight:   86.5 kg (190 lb 11.2 oz)      Intake and Output:   10/29 1901 - 10/31 0700  In: 600 [P.O.:250; I.V.:350]  Out: 40   No intake/output data recorded.     Physical Exam:   Constitutional:  the patient is well developed and in no acute distress  HEENT:  Sclera clear, oral mucosa moist  Lungs: CTA bilaterally, no wheezes  Cardiovascular:  RRR,  S1, S2, no rub  Abd/GI: soft and non-tender; with positive bowel sounds. Ext:  There is no lower leg edema. Access: Right TCC intact-no evidence of infection, right thigh AVG s/p placement + bruit, + thrill      LAB  Recent Labs     10/31/18  0420   WBC 5.7   HGB 12.1   HCT 38.4   *     No results for input(s): NA, K, CL, CO2, GLU, BUN, CREA, MG, PHOS, ALB, TBIL, GPT, SGOT, BNPP in the last 72 hours. No lab exists for component: TROIP  No results for input(s): PH, PCO2, PO2, HCO3 in the last 72 hours. Assessment:  (Medical Decision Making)     Hospital Problems  Date Reviewed: 10/31/2018          Codes Class Noted POA    [facility] ICD-10-CM: N18.6  ICD-9-CM: 585.6  10/30/2018 Yes        * (Principal) Encounter regarding vascular access for dialysis for end-stage renal disease ([facility] ICD-10-CM: N18.6, Z99.2  ICD-9-CM: 585.6  10/30/2018 Unknown              Plan:  (Medical Decision Making)   1. ESRD outpatient [facility] Labs and chart reviewed  - HD today for volume and clearance  -S/P AVG placed in right thigh + bruit, + thrill    2.  Anemia- stable per KDOKI guidelines    Rocío Anaya NP

## 2018-10-31 NOTE — PROGRESS NOTES
Bedside shift report given to Torie St RN (oncoming nurse) by Rachel Cooper RN (offgoing nurse). Bedside shift report included the following information: SBAR, Kardex, ED Summary, MAR, and Recent Results.

## 2018-10-31 NOTE — PROGRESS NOTES
Progress Note Patient: Darrell Grullon MRN: [id number]  SSN: [id number] YOB: 1950  Age: 70 y.o. Sex: female Admission Date: 10/30/2018 LOS: 0 days 1 Day Post-Op PROCEDURE(S): 
ARTERIO VENOUS GRAFT RIGHT THIGH Subjective:  
 
Patient complaining of hand pain (has OA), worried about next dialysis. Gets HD MWF at [facility], discussed options with KALEB Randle RN. Objective:  
 
Vitals:  
 10/30/18 2253 10/31/18 0321 10/31/18 4107 10/31/18 1692 BP: 118/56 117/58  133/58 Pulse: 79 83  79 Resp: 18 18 18 Temp: 98.2 °F (36.8 °C) 98 °F (36.7 °C)  98.7 °F (37.1 °C) SpO2: 96% 95%  93% Weight:   190 lb 11.2 oz (86.5 kg) Intake and Output: 
Current Shift: No intake/output data recorded. Last three shifts: 10/29 1901 - 10/31 0700 In: 600 [P.O.:250; I.V.:350] Out: 40 Physical Exam:  
GENERAL: drowsy, cooperative, no distress; temp cath at right anterior chest 
LUNG: clear to auscultation bilaterally, normal respiratory effort HEART: regular rate and rhythm ABDOMEN: soft, nontender, obese and protuberant but not distended, bowel sounds normoactive EXTREMITIES: numerous failed HD access at B UE; right thigh with surgical dressings in place, minimal edema, mild tenderness to palpation, +thrill and bruit, Lab/Data Review: CBC:  
Lab Results Component Value Date/Time WBC 5.7 10/31/2018 04:20 AM  
 HGB 12.1 10/31/2018 04:20 AM  
 HCT 38.4 10/31/2018 04:20 AM  
  (L) 10/31/2018 04:20 AM  
  
 
Assessment / Plan:  
 
Principal Problem: 
  Encounter regarding vascular access for dialysis for end-stage renal disease ([facility] [address] (10/30/2018) Active Problems: 
  End stage renal disease ([facility] [address] [facility] (10/30/2018) Will best determine HD options Should D/C home today Signed By: Guillaume Goldberg PA-C October 31, 2018 Physician Assistant with [facility] Mavis Homans.  Lyndsey Natarajan MD / Jim Mart MD

## 2018-10-31 NOTE — PROGRESS NOTES
Care Management Interventions Palliative Care Criteria Met (RRAT>21 & CHF Dx)?: No(RRRAT 23 Dx S/P Right thigh access) Discharge Durable Medical Equipment: No 
Physical Therapy Consult: No 
Occupational Therapy Consult: No 
Speech Therapy Consult: No 
Attempted to meet patient for d/c planning and discuss observation status and she is off the floor. Patient is ESRD and has dialysis [facility]. Will attempt this afternoon to see patient.

## 2018-10-31 NOTE — PROGRESS NOTES
Bedside and Verbal shift change report given to Shilo Parish RN. Report included the following information SBAR, Kardex, Procedure Summary, Intake/Output, MAR and Recent Results.

## 2018-11-01 ENCOUNTER — PATIENT OUTREACH (OUTPATIENT)
Dept: CASE MANAGEMENT | Age: 68
End: 2018-11-01

## 2018-11-01 NOTE — PROGRESS NOTES
This note will not be viewable in [address] RNCM called pt who was noted [facility], pt s/p Vasc Access for HD. Pt attends [facility], however missed several appts during Oct because she did not wish to go. RNCM encouraged pt to notify [employer] if she decides not to go to prevent her from losing services with them again. Pt stated she didn't have to go if she didn't want to, and the government can't tell her what to do. Pt asked that RNCM not contact her again. This RNCM will no longer follow. Pt will continue to be HRRA due to chronic noncompliance.

## 2018-11-29 ENCOUNTER — HOSPITAL ENCOUNTER (EMERGENCY)
Age: 68
Discharge: HOME | End: 2018-11-29
Attending: EMERGENCY MEDICINE
Payer: MEDICARE

## 2018-11-29 VITALS
HEIGHT: 59 IN | RESPIRATION RATE: 18 BRPM | OXYGEN SATURATION: 94 % | HEART RATE: 75 BPM | SYSTOLIC BLOOD PRESSURE: 111 MMHG | TEMPERATURE: 98.2 F | WEIGHT: 175 LBS | DIASTOLIC BLOOD PRESSURE: 61 MMHG | BODY MASS INDEX: 35.28 KG/M2

## 2018-11-29 DIAGNOSIS — Z99.2 ENCOUNTER REGARDING VASCULAR ACCESS FOR DIALYSIS FOR END-STAGE RENAL DISEASE (HCC): ICD-10-CM

## 2018-11-29 DIAGNOSIS — N18.6 ENCOUNTER REGARDING VASCULAR ACCESS FOR DIALYSIS FOR END-STAGE RENAL DISEASE (HCC): ICD-10-CM

## 2018-11-29 DIAGNOSIS — M71.30 SYNOVIAL CYST: ICD-10-CM

## 2018-11-29 DIAGNOSIS — B02.9 HERPES ZOSTER WITHOUT COMPLICATION: Primary | ICD-10-CM

## 2018-11-29 PROCEDURE — 75810000218 HC FINE NEEDLE ASPIRATION: Performed by: EMERGENCY MEDICINE

## 2018-11-29 PROCEDURE — 99284 EMERGENCY DEPT VISIT MOD MDM: CPT | Performed by: EMERGENCY MEDICINE

## 2018-11-29 RX ORDER — FAMCICLOVIR 500 MG/1
500 TABLET ORAL 3 TIMES DAILY
Qty: 21 TAB | Refills: 0 | Status: SHIPPED | OUTPATIENT
Start: 2018-11-29 | End: 2018-12-06

## 2018-11-29 RX ORDER — HYDROCODONE BITARTRATE AND ACETAMINOPHEN 5; 325 MG/1; MG/1
1 TABLET ORAL
Qty: 30 TAB | Refills: 0 | Status: SHIPPED | OUTPATIENT
Start: 2018-11-29 | End: 2019-01-24 | Stop reason: ALTCHOICE

## 2018-11-29 NOTE — ED TRIAGE NOTES
Pt has been having blisters on her skin for the past 2 weeks. States it is worse under her right breast. Pt states they go in a line. States she hasnt gotten her shingles shot. States they are tender and states they have water in them
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
## 2018-11-29 NOTE — DISCHARGE INSTRUCTIONS

## 2018-11-29 NOTE — ED NOTES
Pt dc avs reviewed with pt including meds. Pt verbalized understanding. Assisted pt with dressing and placed in w/c. Coffee given to pt and taken to lobby. Ed  notiified medicaid van for pt p/u.

## 2019-01-24 PROBLEM — Z95.828 ARTERIOVENOUS GRAFT FOR HEMODIALYSIS IN PLACE, PRIMARY: Status: ACTIVE | Noted: 2019-01-24

## 2019-03-16 ENCOUNTER — HOSPITAL ENCOUNTER (EMERGENCY)
Age: 69
Discharge: HOME | End: 2019-03-16
Attending: EMERGENCY MEDICINE
Payer: MEDICARE

## 2019-03-16 VITALS
HEART RATE: 79 BPM | RESPIRATION RATE: 18 BRPM | HEIGHT: 59 IN | OXYGEN SATURATION: 98 % | TEMPERATURE: 98 F | WEIGHT: 185 LBS | BODY MASS INDEX: 37.29 KG/M2 | SYSTOLIC BLOOD PRESSURE: 135 MMHG | DIASTOLIC BLOOD PRESSURE: 57 MMHG

## 2019-03-16 DIAGNOSIS — T82.590A AV GRAFT MALFUNCTION, INITIAL ENCOUNTER (HCC): Primary | ICD-10-CM

## 2019-03-16 PROCEDURE — 99284 EMERGENCY DEPT VISIT MOD MDM: CPT | Performed by: EMERGENCY MEDICINE
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
## 2019-03-16 NOTE — DISCHARGE INSTRUCTIONS
Follow-up with Dr. Sp Cho in the office Monday or Tuesday for reevaluation. Return for worsening or concerning symptoms.

## 2019-03-16 NOTE — ED TRIAGE NOTES
Pt arrives via EMS. Pt was at dialysis and staff at clinic states swelling at access site that has increased from size of a quarter to size of a softball. Access is right leg. Pt denies any pain at the access site and EMS states area doesn't appear edematous in route. VSS in route. Pt states she has hx of graft rupture few months ago in area near her graft now. Pt states it has been getting bigger and bigger.  Thrill and bruit noted with a more coarse thrill over area of swelling

## 2019-03-16 NOTE — ED NOTES
I have reviewed discharge instructions with the patient. The patient verbalized understanding. Patient left ED via Discharge Method: ambulatory to Home with EMS transport. Opportunity for questions and clarification provided. Patient given 0 scripts. To continue your aftercare when you leave the hospital, you may receive an automated call from our care team to check in on how you are doing. This is a free service and part of our promise to provide the best care and service to meet your aftercare needs.  If you have questions, or wish to unsubscribe from this service please call [phone]. Thank you for Choosing our [facility] Emergency Department.

## 2019-03-16 NOTE — ED NOTES
Patient refused to sign discharge paperwork. Patient states, \"Y'all aren't even taking care of me, I can't believe this. I haven't seen a doctor. \" Patient reassured multiple times that necessary consults were made and that the ED doctor saw her and spoke with her earlier. Patient continues to deny and then to insult this RN.

## 2019-03-16 NOTE — ED NOTES
Patient initially refused logisticare despite having medicaid. Stated that she couldn't walk and that she couldn't put her shoes on and would not get up from the bed. EMS transport was arranged. Patient called hospital security in the meantime insisting on a wheelchair to be transported to the [facility]. Patient assured that her transport had been arranged and that they would be coming to get her from her bed. Patient upset and states she wants to go to the lobby. Patient put on her shoes and walked out to the lobby. EMS transport arrived to take patient home. EMS staff and this RN out to [facility] to speak with patient. Patient refusing transport and states \"No, I don't trust y'all. \" Continues to insist that it is about \"white people running everything and making money\". Patient states that she called her cousin in [place] who arranged for her to get a cab. EMS transport left due to patient becoming upset and refusing to go.

## 2019-05-08 PROBLEM — E87.5 HYPERKALEMIA: Status: RESOLVED | Noted: 2018-06-01 | Resolved: 2019-05-08

## 2019-09-04 PROBLEM — T82.7XXA INFECTION OF AV GRAFT FOR DIALYSIS (HCC): Status: RESOLVED | Noted: 2018-06-18 | Resolved: 2019-09-04

## 2019-09-04 PROBLEM — N18.6 ENCOUNTER REGARDING VASCULAR ACCESS FOR DIALYSIS FOR END-STAGE RENAL DISEASE (HCC): Status: RESOLVED | Noted: 2018-10-30 | Resolved: 2019-09-04

## 2019-09-04 PROBLEM — Z99.2 ENCOUNTER REGARDING VASCULAR ACCESS FOR DIALYSIS FOR END-STAGE RENAL DISEASE (HCC): Status: RESOLVED | Noted: 2018-10-30 | Resolved: 2019-09-04

## 2019-09-20 ENCOUNTER — HOSPITAL ENCOUNTER (OUTPATIENT)
Dept: PHYSICAL THERAPY | Age: 69
Discharge: HOME | End: 2019-09-20
Attending: FAMILY MEDICINE
Payer: MEDICARE

## 2019-09-20 NOTE — PROGRESS NOTES
Rosetta Odell Severs  : 1950  [place]  [place] [facility]  [address], [address] [place] [address], [address]  [phone]   [phone]          OUTPATIENT DAILY NOTE    NAME/AGE/GENDER: Alonso Payne is a 78 y.o. female. DATE: 2019    Patients visit needed to be reschedule due to the therapist being out of the office. She was offered a different lunch time today and another time for Monday but declined both. She reschedule for a week and a half from now.      ARNAV RendonT

## 2019-09-30 ENCOUNTER — HOSPITAL ENCOUNTER (OUTPATIENT)
Dept: PHYSICAL THERAPY | Age: 69
Discharge: HOME | End: 2019-09-30
Attending: FAMILY MEDICINE
Payer: MEDICARE

## 2019-09-30 PROCEDURE — 97110 THERAPEUTIC EXERCISES: CPT

## 2019-09-30 PROCEDURE — 97161 PT EVAL LOW COMPLEX 20 MIN: CPT
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
## 2019-10-04 NOTE — PROGRESS NOTES
Norman Baldwin  : 1950  [place]  [place]: [place] Of [facility]  [address], [address] [facility] [place], [place] [address] [address] [facility]  [phone]   [phone]      OUTPATIENT PHYSICAL THERAPY: Daily Treatment Note 2019    Visit Count:  1      ICD-10: Treatment Diagnosis: Low back pain (M54.5)  Difficulty in walking, not elsewhere classified (R26.2)  Precautions/Allergies:   Vancomycin and Vancomycin analogues   TREATMENT PLAN:  Effective Dates: 2019 TO 2019 (90 days). Frequency/Duration: 2 times a week for 90 Day(s)    Pre-treatment Symptoms/Complaints:  Reports difficulty standing up straight  Pain: Initial:   5/10 Post Session:  5/10   Medications Last Reviewed:  2019  Updated Objective Findings:  See evaluation note from today   TREATMENT:       THERAPEUTIC EXERCISE: (8 minutes):  Exercises per grid below to improve mobility, strength and balance. Required moderate visual, verbal and tactile cues to promote proper body alignment, promote proper body posture and promote proper body mechanics. Progressed complexity of movement as indicated. Date:   Date:   Date:     Activity/Exercise Parameters Parameters Parameters   Glut sets 2 x 10 reps with 10 sec hold                                                   [employer] Portal  Treatment/Session Summary:    · Response to Treatment: patient demonstrated ability to complete ADLs  · No adverse reactions/unusual changes observed/reported in clinical status this session. · Communication/Consultation:  eval sent to MD  · Equipment provided today:  None today  · Recommendations/Intent for next treatment session: Next visit will focus on improve posture and strength.   Total Treatment Billable Duration:  8 minutes  PT Patient Time In/Time Out  Time In: 1015  Time Out: [facility], DPT    Future Appointments   Date Time Provider Matti Kenny 10/7/2019  1:00 PM Vj Salomon PTA [facility]   10/9/2019  8:45 AM KINSEY Mcknight D   10/14/2019  8:45 AM DANIELLE Orozco APOLONIA   10/16/2019  8:45 AM KINSEY Mcknight D   10/21/2019  8:45 AM DANIELLE Orozco D   10/23/2019  8:45 AM Malinda Cline [facility] Joel Khan

## 2019-10-07 ENCOUNTER — HOSPITAL ENCOUNTER (OUTPATIENT)
Dept: PHYSICAL THERAPY | Age: 69
Discharge: HOME | End: 2019-10-07
Attending: FAMILY MEDICINE

## 2019-10-07 NOTE — PROGRESS NOTES
Therapy Center at [address], [place], [facility]  [phone]   [phone]     OUTPATIENT DAILY NOTE     DATE: 10/7/2019    SUBJECTIVE:  Patient called and cancelled remaining appointments due to transportation issues and stated she will call back with new order when the transportation issue is taken care of. Will discontinue at this time.     Caridad Nava, PTA

## 2019-10-09 ENCOUNTER — HOSPITAL ENCOUNTER (OUTPATIENT)
Dept: PHYSICAL THERAPY | Age: 69
Discharge: HOME | End: 2019-10-09
Attending: FAMILY MEDICINE

## 2019-10-09 NOTE — THERAPY DISCHARGE
Cami Laguerre  : 1950  [place]  [place]: [place] [facility]  [address], [address] [place] [address]  [phone]   [phone]         OUTPATIENT PHYSICAL THERAPY:Discontinuation Summary 10/9/2019    ICD-10: Treatment Diagnosis: Low back pain (M54.5)  Difficulty in walking, not elsewhere classified (R26.2)  Precautions/Allergies:   Vancomycin and Vancomycin analogues   TREATMENT PLAN:  Effective Dates: 2019 TO 2019 (90 days). Frequency/Duration: 2 times a week for 90 Day(s) MEDICAL/REFERRING DIAGNOSIS:  Other malaise [R53.81]   DATE OF ONSET: progressive over years  REFERRING PHYSICIAN: Asad Lomeli MD MD Orders: evaluate and treat  RETURN PHYSICIAN APPOINTMENT: unknown      ASSESSMENT (Date: 19): Suzanne Espino has been seen in physical therapy from 19 to 19 for 1 visits. Treatment has been discontinued at this time due to patient requested discharge due to lack of transportation. The below goals were met prior to discontinuation. Some goals were not met due to no treatment provided. Thank you for this referral.         PROBLEM LIST (Impacting functional limitations):  1. Decreased Strength  2. Decreased ADL/Functional Activities  3. Decreased Transfer Abilities  4. Decreased Ambulation Ability/Technique  5. Decreased Balance  6. Decreased Activity Tolerance  7. Decreased [place] with Home Exercise Program INTERVENTIONS PLANNED:  1. Balance Exercise  2. Cold  3. Gait Training  4. Heat  5. Home Exercise Program (HEP)  6. Manual Therapy  7. Therapeutic Activites  8. Therapeutic Exercise/Strengthening  9. Transfer Training     GOALS: (Goals have been discussed and agreed upon with patient.)    Discharge Goals: Time Frame: 90 days- NOT MET  1. Patient will be independent with HEP.    2. Patient will have an increase on the esquivel balance to 40 in order to improve functional mobility. 3. Patient will be compliant in ambulating with her hands on the rollator verse her forearms indicating improve posture. 4. Patient will demonstrate standing for 5 minutes with good posture indicating improve core strength. Outcome Measure: Tool Used: Payne Balance Scale  Score:  Initial: 30/56 Most Recent: X/56 (Date: -- )   Interpretation of Score: Each section is scored on a 0-4 scale, 0 representing the patients inability to perform the task and 4 representing independence. The scores of each section are added together for a total score of 56. The higher the patients score, the more independent the patient is. Any score below 45 indicates increased risk for falls. Ambulatory/Rehab Services H2 Model Falls Risk Assessment    Risk Factors:       No Risk Factors Identified Ability to Rise from Chair:       (1)  Pushes up, successful in one attempt    Falls Prevention Plan:       No modifications necessary   Total: (5 or greater = High Risk): 1    ©2010 [facility] of [employer]. All Rights Reserved. [facility] States Patent #6,274,551. Federal Law prohibits the replication, distribution or use without written permission from [facility] of [address] For Stated Goals: Good  Regarding [employer] Lamont Lokesh's therapy, I certify that the treatment plan above will be carried out by a therapist or under their direction.   Thank you for this referral,  Brennon Costa DPT     Referring Physician Signature: Rogelio Springer MD

## 2019-10-14 ENCOUNTER — APPOINTMENT (OUTPATIENT)
Dept: PHYSICAL THERAPY | Age: 69
End: 2019-10-14
Attending: FAMILY MEDICINE

## 2019-10-16 ENCOUNTER — APPOINTMENT (OUTPATIENT)
Dept: PHYSICAL THERAPY | Age: 69
End: 2019-10-16
Attending: FAMILY MEDICINE

## 2019-10-21 ENCOUNTER — APPOINTMENT (OUTPATIENT)
Dept: PHYSICAL THERAPY | Age: 69
End: 2019-10-21
Attending: FAMILY MEDICINE

## 2019-10-23 ENCOUNTER — APPOINTMENT (OUTPATIENT)
Dept: PHYSICAL THERAPY | Age: 69
End: 2019-10-23
Attending: FAMILY MEDICINE

## 2019-11-13 ENCOUNTER — PATIENT OUTREACH (OUTPATIENT)
Dept: CASE MANAGEMENT | Age: 69
End: 2019-11-13

## 2019-11-18 NOTE — PROGRESS NOTES
Agreeable to [facility] outreach 11/18/19, 8614   Referral Source & Reason High risk for admission   Primary concerns per patient and/or pts family/caregiver Leg swelling, itching all over   Recent Hospitalization(s)/ED Visits None   Current with Home Health?  - Agency? No   Social Needs:  - Able to afford medications? - Financial assessment?  - Access to care? Transportation? Meds are affordable - Medicare, Medicaid    [facility] aide transports her to appmts, errands, etc   Nutritional Assessment  - Appetite? - Obesity?  - Failure to Thrive? - Bowels ? MOW  KALEB Tu-Th- 10-3   Cognitive Assessment  - History of dementia  - Health literacy    Fair health literacy   Mobility/Activity Assessment  - Bed/chair bound? - Make use of assistive devices? - Does patient still drive? Uses rolling walker, has a power chair but doesn't use because it \"goes too fast\"  Does not drive   Plan/Interventions/Education  - Follow up Appointments  - Referrals Prefers to make f/u appmt today in person for LE swelling     Meds reviewed, she states she has forgotten about anti-itch cream, Kenalog. Reviewed directions, understands. Plan to f/u next week. This note will not be viewable in [address]

## 2019-11-27 ENCOUNTER — PATIENT OUTREACH (OUTPATIENT)
Dept: CASE MANAGEMENT | Age: 69
End: 2019-11-27

## 2019-11-27 NOTE — PROGRESS NOTES
Community Care Management  Follow up Outreach Note   Outreach type: Phone call: Yes   Date/Time of Outreach: 11/27/19, 2904     Reason for follow-up:   Continued outreach   Disease specific complaints/issues:   \"I am homeless\"     Patient progress towards goals set from last contact:   Stable   Has patient attended any PCP or specialist follow-up appointments since last contact? What was outcome of appointment? When is next follow-up scheduled? None   Review medications. Any medication changes since last outreach? Does patient have any questions or issues related to their medications? Pt reports compliance    Home health active? If yes  any issue? Progress? No   Referrals needed?  (SW, Diabetes education, HH, etc. ) No   Other issues/Miscellaneous? (Transportation, access to meals, ability to perform ADLs, adequate caregiver support, etc.) Long conversation about her various social problems: living in a friend's utility room ( she was evicted from past residence for \"c/o about lack of heat\" and is reportedly disqualified from some rentals because of this), TC staff are terrible and I \"fired one the other day for cursing at me\". She has a supportive [employer] family, but all her family is \"on drugs or alcoholics\" so cannot assist. SWs from [facility] and HD are currently assisting her, so no SW CM referral will be made. Discussed need for MD/ PCP f/u and she states \"I know my health is important, but I have to find somewhere to live\". Next Outreach Scheduled: Next week     Next Steps/Goals:   F/U   Community Care Manager: Mohit Lee RN         This note will not be viewable in [address]

## 2019-12-09 ENCOUNTER — PATIENT OUTREACH (OUTPATIENT)
Dept: CASE MANAGEMENT | Age: 69
End: 2019-12-09

## 2019-12-10 NOTE — PROGRESS NOTES
Ambulatory Care Management  Follow up Outreach Note   Outreach type: Phone call: Yes     Date/Time of Outreach: 12/10/19, 1415     Reason for follow-up:   Continued outreach   Disease specific complaints/issues: \"I didn't go to dialysis today\" - \"I go when I feel like it and don't go when I don't want to\"     Patient progress towards goals set from last contact:   Stable   Has patient attended any PCP or specialist follow-up appointments since last contact? What was outcome of appointment? When is next follow-up scheduled? Encouraged to make PCP f/u visit, pt is noncommittal.   Review medications. Any medication changes since last outreach? Does patient have any questions or issues related to their medications? Reports compliance, no issues w/ meds. Home health active? If yes  any issue? Progress? No   Referrals needed?  (SW, Diabetes education, HH, etc. ) No   Other issues/Miscellaneous? (Transportation, access to meals, ability to perform ADLs, adequate caregiver support, etc.)   Reports [facility]TC aide has been resumed (a new one). Encouraged to speak w/ SW at HD re her many needs. Again she is noncommittal, also says \" oh she's no good\". Also encouraged to attend HD regularly, denies SOB, states edema is \"normal\" for me. Next Outreach Scheduled: Next week     Next Steps/Goals:   F/U   Ambulatory Care Manager/ LPN Care Coordinator: Luke Fuller RN           This note will not be viewable in [address]

## 2019-12-19 ENCOUNTER — PATIENT OUTREACH (OUTPATIENT)
Dept: CASE MANAGEMENT | Age: 69
End: 2019-12-19

## 2019-12-20 NOTE — PROGRESS NOTES
Ambulatory Care Management  Follow up Outreach Note   Outreach type: Phone call: Yes     Date/Time of Outreach: 12/20/19, 4009     Reason for follow-up:   Continued outreach   Disease specific complaints/issues: LE swelling, \"I have blisters\"     Patient progress towards goals set from last contact:   Stable   Has patient attended any PCP or specialist follow-up appointments since last contact? What was outcome of appointment? When is next follow-up scheduled? Encouraged to make PCP f/u appmt. Call to PCP office per pt request, message left. Review medications. Any medication changes since last outreach? Does patient have any questions or issues related to their medications? Pt reports compliance   Home health active? If yes  any issue? Progress? No   Referrals needed?  (SW, Diabetes education, HH, etc. ) No   Other issues/Miscellaneous? (Transportation, access to meals, ability to perform ADLs, adequate caregiver support, etc.) Reports continues to live w/ friend, SWs at [facility] \"looking\" for her another place to live. New [facility] aide apparently working out, she is available to take pt to [facility], 8-12     Next Outreach Scheduled: Next week     Next Steps/Goals:   F/U   Ambulatory Care Manager/ LPN Care Coordinator: Rosa Elena Momin RN     Call back from PCP office, appmt made for pt with Dr Quyen Traango 12/27 at 8:45am. Call to pt to update, she states [facility] aide can take her. Plan to f/u next week. This note will not be viewable in [address]

## 2019-12-27 ENCOUNTER — PATIENT OUTREACH (OUTPATIENT)
Dept: CASE MANAGEMENT | Age: 69
End: 2019-12-27

## 2019-12-27 NOTE — PROGRESS NOTES
Ambulatory Care Management  Follow up Outreach Note   Outreach type: Phone call: Yes     Date/Time of Outreach: 12/27/19, 0750     Reason for follow-up:   Continued outreach   Disease specific complaints/issues: \"I don't have a ride to my appointment\"     Patient progress towards goals set from last contact:   Stable   Has patient attended any PCP or specialist follow-up appointments since last contact? What was outcome of appointment? When is next follow-up scheduled? 12/27 PCP = Pt requests this RN cancel due to not having a ride. Message left at [facility] to reschedule. Review medications. Any medication changes since last outreach? Does patient have any questions or issues related to their medications? Reports compliance   Home health active? If yes  any issue? Progress? No   Referrals needed?  (SW, Diabetes education, HH, etc. ) No   Other issues/Miscellaneous? (Transportation, access to meals, ability to perform ADLs, adequate caregiver support, etc.)     Pt reports [facility] aide has \"pneumonia\" and cannot take her to PCP appmt. Agreeable to this RN rescheduling. Encouraged PCP f/u, pt states she is seen by MDs 3X/week at dialysis and really doesn't think she needs f/u. Next Outreach Scheduled: Next week     Next Steps/Goals:   F/U   Ambulatory Care Manager/ LPN Care Coordinator: Daniel Nguyen RN           This note will not be viewable in [address]

## 2020-01-03 ENCOUNTER — PATIENT OUTREACH (OUTPATIENT)
Dept: CASE MANAGEMENT | Age: 70
End: 2020-01-03

## 2020-01-03 NOTE — PROGRESS NOTES
RNCM call to pt, message left to remind pt of PCP appmt on 1/6. No return calls. Plan to f/u next week. This note will not be viewable in [address]

## 2020-01-09 ENCOUNTER — PATIENT OUTREACH (OUTPATIENT)
Dept: CASE MANAGEMENT | Age: 70
End: 2020-01-09

## 2020-01-10 NOTE — PROGRESS NOTES
RNCM call to pt, message left. Pt returned call and attempted to discuss missed PCP appmt, upcoming rescheduled appmt next week, importance of not missing again. Call was cut off or pt hung up on pt. This RN called back, message left to return call. No return call, plan to f/u next week. This note will not be viewable in [address]

## 2020-01-16 ENCOUNTER — PATIENT OUTREACH (OUTPATIENT)
Dept: CASE MANAGEMENT | Age: 70
End: 2020-01-16

## 2020-01-17 NOTE — PROGRESS NOTES
RNCM calls to pt, messages left, no return call. Note PCP visit Wed, 1/15. Plan to f/u next week. This note will not be viewable in [address]

## 2020-01-23 ENCOUNTER — PATIENT OUTREACH (OUTPATIENT)
Dept: CASE MANAGEMENT | Age: 70
End: 2020-01-23

## 2020-01-24 NOTE — PROGRESS NOTES
RNCM calls to pt, messages left, and then pt returned call. Attempted to discuss pt's recent PCP f/u. Pt is not interested in continuing conversation, requests this RN not call again. Thanked pt for her time. No further outreach planned. This note will not be viewable in [address]

## 2020-06-29 ENCOUNTER — PATIENT OUTREACH (OUTPATIENT)
Dept: CASE MANAGEMENT | Age: 70
End: 2020-06-29

## 2020-06-29 NOTE — PROGRESS NOTES
RNCM received call from pt. She is requesting a new bed. She states she does not have a bed and someone at dialysis told her she needed to elevate her legs at night instead of leaving them hanging down. Encouraged to speak to SW at HD about DME. Also encouraged attendance at Cardiology appmt on Thursday. She is calling this am to secure transportation. Support, encouragement offered. Asking if this RN can call her regularly for any needs. Plan to f/u next week and assess needs, pt agreeable, appreciative.

## 2020-07-14 ENCOUNTER — PATIENT OUTREACH (OUTPATIENT)
Dept: CASE MANAGEMENT | Age: 70
End: 2020-07-14

## 2020-07-15 NOTE — PROGRESS NOTES
Ambulatory Care Management  Follow up Outreach Note   Outreach type: Phone call: Yes     Date/Time of Outreach: 7/15/20, 1005     Reason for follow-up:   Continued outreach   Disease specific complaints/issues: \"I need a bed\"     Patient progress towards goals set from last contact:   Stable   Has patient attended any PCP or specialist follow-up appointments since last contact? What was outcome of appointment? When is next follow-up scheduled? 7/17 Cardiology, Dr Linder Schaumburg   Review medications. Any medication changes since last outreach? Does patient have any questions or issues related to their medications? Reports compliance, no questions/ issues   Home health active? If yes  any issue? Progress? No   Referrals needed?  (SW, Diabetes education, HH, etc. ) No   Other issues/Miscellaneous? (Transportation, access to meals, ability to perform ADLs, adequate caregiver support, etc.)   Pt reports \"I only go to HD when I feel like it\"    Discussed w/ pt talking to SW re getting a bed.        Next Outreach Scheduled: 1-2 weeks     Next Steps/Goals:   F/U   Ambulatory Care Manager/ LPN Care Coordinator: Carmen Aquino RN

## 2020-07-30 ENCOUNTER — PATIENT OUTREACH (OUTPATIENT)
Dept: CASE MANAGEMENT | Age: 70
End: 2020-07-30

## 2020-07-30 NOTE — PROGRESS NOTES
RNCM call to pt, she reports she is doing well. Averaging 2 trips to HD weekly. States she knows how to eat and drink to not need dialysis as much. Also reports she got a hospital bed, edema in legs better but not gone. Reviewed upcoming appmts. Aware of COVID precautions. Plan to f/u in 2 weeks and prob d/c.

## 2020-08-14 ENCOUNTER — PATIENT OUTREACH (OUTPATIENT)
Dept: CASE MANAGEMENT | Age: 70
End: 2020-08-14

## 2020-08-14 NOTE — PROGRESS NOTES
Ambulatory Care Management  Follow up Outreach Note   Outreach type: Phone call: Yes     Date/Time of Outreach: 8/14/20, 1216     Reason for follow-up:   Continued outreach   Disease specific complaints/issues: None     Patient progress towards goals set from last contact:   Stable   Has patient attended any PCP or specialist follow-up appointments since last contact? What was outcome of appointment? When is next follow-up scheduled? 9/2 Cardiology, Dr Anum Ashby    9/14 PCP, Dr Quyen Tarango   Review medications. Any medication changes since last outreach? Does patient have any questions or issues related to their medications? Reports compliance, no questions- reports LE edema better w/ bed use at night instead of sleeping in a chair   Home health active? If yes  any issue? Progress? No   Referrals needed?  (SW, Diabetes education, HH, etc. ) No   Other issues/Miscellaneous?  (Transportation, access to meals, ability to perform ADLs, adequate caregiver support, etc.)   None         Next Outreach Scheduled: 1-2 weeks     Next Steps/Goals:   Prob d/c   Ambulatory Care Manager/ LPN Care Coordinator: Rosa Elena Momin RN

## 2020-08-31 ENCOUNTER — PATIENT OUTREACH (OUTPATIENT)
Dept: CASE MANAGEMENT | Age: 70
End: 2020-08-31

## 2020-09-09 ENCOUNTER — PATIENT OUTREACH (OUTPATIENT)
Dept: CASE MANAGEMENT | Age: 70
End: 2020-09-09

## 2020-09-09 NOTE — PROGRESS NOTES
RNCM call to pt. She denies any questions, needs at this time. Discussed upcoming PCP appmt, she has cancelled recent Cardiology and GI appmts, offers no reason. Is able to arrange transport. Discussed flu shot. Plan to f/u 1-2 weeks and discharge if no needs.

## 2020-09-24 ENCOUNTER — PATIENT OUTREACH (OUTPATIENT)
Dept: CASE MANAGEMENT | Age: 70
End: 2020-09-24

## 2020-09-24 NOTE — PROGRESS NOTES
RNCM call to pt. She reports cancelling recent PCP f/u, seems unaware PCP transferred to [facility]. Pt states she wishes to stay w/ Bobbi Jordan and is agreeable to assistance finding a new PCP. Email sent to [facility] Referrals. No issues, complaints today re health status, HD, etc. Plan to f/u 1-2 weeks and d/c at that time. Pt appreciative of outreach.

## 2020-10-01 ENCOUNTER — PATIENT OUTREACH (OUTPATIENT)
Dept: CASE MANAGEMENT | Age: 70
End: 2020-10-01

## 2020-10-01 NOTE — PROGRESS NOTES
RNCM call to pt. She reports feeling tired. Discussed new PCP appmt 11/11 w/ Dr Kori Garcia. Pt states she wants to [facility] her bottom checked\". Encouraged to go to Urgent Care today, should not wait 6 weeks for other appmt. States she will go today. Support, encouragement offered. Plan to f/u.

## 2020-10-16 ENCOUNTER — PATIENT OUTREACH (OUTPATIENT)
Dept: CASE MANAGEMENT | Age: 70
End: 2020-10-16

## 2020-10-16 NOTE — PROGRESS NOTES
Ambulatory Care Management  Follow up Outreach Note   Outreach type: Phone call: Yes     Date/Time of Outreach: 10/16/20, 1111     Reason for follow-up:   Continued outreach   Disease specific complaints/issues: Still needs to have \"bottom\" checked     Patient progress towards goals set from last contact:   Stable   Has patient attended any PCP or specialist follow-up appointments since last contact? What was outcome of appointment? When is next follow-up scheduled? 11/11 New PCP, Dr Cosme Gerber   Review medications. Any medication changes since last outreach? Does patient have any questions or issues related to their medications? Pt reports compliance. Usually attends HD 2X/ week, states I don't drink a lot   Home health active? If yes  any issue? Progress? NA   Referrals needed?  (SW, Diabetes education, HH, etc. ) NA   Other issues/Miscellaneous? (Transportation, access to meals, ability to perform ADLs, adequate caregiver support, etc.)   Strongly encouraged again to get \"bottom\" checked either at Urgent Care or at HD tomorrow. She states it could be a hemmorroid. Educated on frequent position changes while seated, pressure relief, importance of getting checked out.  Pt noncommittal.       Next Outreach Scheduled: 1-2 weeks     Next Steps/Goals:   F/U   Ambulatory Care Manager/ LPN Care Coordinator: Connie Patten RN

## 2020-11-03 ENCOUNTER — PATIENT OUTREACH (OUTPATIENT)
Dept: CASE MANAGEMENT | Age: 70
End: 2020-11-03

## 2020-11-03 NOTE — PROGRESS NOTES
RNCM call to pt to remind her of upcoming PCP appmt 11/11. Given all contact info to book trip w/ [employer]. Pt denies any questions/ issues/ concerns. Plan to f/u next week to remind pt again and then d/c from f/u.

## 2020-11-10 ENCOUNTER — PATIENT OUTREACH (OUTPATIENT)
Dept: CASE MANAGEMENT | Age: 70
End: 2020-11-10

## 2020-11-11 NOTE — PROGRESS NOTES
RNCM calls to pt for continued CCM, messages left, returned call. Pt reports she is planning to go to new PCP appmt this afternoon. Praise and encouragement offered. Denies any questions, concerns at present. Plan to f/u next week and d/c from f/u.

## 2020-11-18 ENCOUNTER — PATIENT OUTREACH (OUTPATIENT)
Dept: CASE MANAGEMENT | Age: 70
End: 2020-11-18

## 2020-11-18 NOTE — PROGRESS NOTES
RNCM call to pt for continued CCM. Note pt missed her appmt last week w/ new PCP Dr Paola Shaw. She claims she was there early, went to the bathroom and then was told she was late and needed to reschedule. Given contact info so she can reschedule. Requesting a referral to therapy. Encouraged to ask MD at  or Dr Paola Shaw at appmt as a MD order is needed. No other needs, concerns at present. Agreeable to d/c from outreach but has this RNs number if any needs in future. No further outreach planned.

## 2020-12-04 ENCOUNTER — HOSPITAL ENCOUNTER (EMERGENCY)
Age: 70
Discharge: HOME | End: 2020-12-04
Attending: EMERGENCY MEDICINE
Payer: MEDICARE

## 2020-12-04 ENCOUNTER — APPOINTMENT (OUTPATIENT)
Dept: GENERAL RADIOLOGY | Age: 70
End: 2020-12-04
Attending: EMERGENCY MEDICINE
Payer: MEDICARE

## 2020-12-04 VITALS
OXYGEN SATURATION: 94 % | HEART RATE: 75 BPM | DIASTOLIC BLOOD PRESSURE: 47 MMHG | TEMPERATURE: 97.5 F | RESPIRATION RATE: 16 BRPM | SYSTOLIC BLOOD PRESSURE: 92 MMHG

## 2020-12-04 DIAGNOSIS — N18.6 ESRD ON DIALYSIS (HCC): ICD-10-CM

## 2020-12-04 DIAGNOSIS — I95.9 HYPOTENSION, UNSPECIFIED HYPOTENSION TYPE: Primary | ICD-10-CM

## 2020-12-04 DIAGNOSIS — Z99.2 ESRD ON DIALYSIS (HCC): ICD-10-CM

## 2020-12-04 LAB
ALBUMIN SERPL-MCNC: 3.3 G/DL (ref 3.2–4.6)
ALBUMIN/GLOB SERPL: 0.7 {RATIO} (ref 1.2–3.5)
ALP SERPL-CCNC: 73 U/L (ref 50–136)
ALT SERPL-CCNC: 22 U/L (ref 12–65)
ANION GAP SERPL CALC-SCNC: 10 MMOL/L (ref 7–16)
AST SERPL-CCNC: 21 U/L (ref 15–37)
BASOPHILS # BLD: 0 K/UL (ref 0–0.2)
BASOPHILS NFR BLD: 1 % (ref 0–2)
BILIRUB SERPL-MCNC: 0.7 MG/DL (ref 0.2–1.1)
BUN SERPL-MCNC: 46 MG/DL (ref 8–23)
CALCIUM SERPL-MCNC: 9.2 MG/DL (ref 8.3–10.4)
CHLORIDE SERPL-SCNC: 102 MMOL/L (ref 98–107)
CO2 SERPL-SCNC: 28 MMOL/L (ref 21–32)
CREAT SERPL-MCNC: 11.5 MG/DL (ref 0.6–1)
DIFFERENTIAL METHOD BLD: ABNORMAL
EOSINOPHIL # BLD: 0.3 K/UL (ref 0–0.8)
EOSINOPHIL NFR BLD: 6 % (ref 0.5–7.8)
ERYTHROCYTE [DISTWIDTH] IN BLOOD BY AUTOMATED COUNT: 14.8 % (ref 11.9–14.6)
GLOBULIN SER CALC-MCNC: 5 G/DL (ref 2.3–3.5)
GLUCOSE SERPL-MCNC: 78 MG/DL (ref 65–100)
HCT VFR BLD AUTO: 37.1 % (ref 35.8–46.3)
HGB BLD-MCNC: 12 G/DL (ref 11.7–15.4)
IMM GRANULOCYTES # BLD AUTO: 0 K/UL (ref 0–0.5)
IMM GRANULOCYTES NFR BLD AUTO: 0 % (ref 0–5)
LYMPHOCYTES # BLD: 1.1 K/UL (ref 0.5–4.6)
LYMPHOCYTES NFR BLD: 23 % (ref 13–44)
MCH RBC QN AUTO: 26.8 PG (ref 26.1–32.9)
MCHC RBC AUTO-ENTMCNC: 32.3 G/DL (ref 31.4–35)
MCV RBC AUTO: 83 FL (ref 79.6–97.8)
MONOCYTES # BLD: 0.6 K/UL (ref 0.1–1.3)
MONOCYTES NFR BLD: 12 % (ref 4–12)
NEUTS SEG # BLD: 2.8 K/UL (ref 1.7–8.2)
NEUTS SEG NFR BLD: 58 % (ref 43–78)
NRBC # BLD: 0 K/UL (ref 0–0.2)
PLATELET # BLD AUTO: 89 K/UL (ref 150–450)
PMV BLD AUTO: 10.3 FL (ref 9.4–12.3)
POTASSIUM SERPL-SCNC: 4.3 MMOL/L (ref 3.5–5.1)
PROT SERPL-MCNC: 8.3 G/DL (ref 6.3–8.2)
RBC # BLD AUTO: 4.47 M/UL (ref 4.05–5.2)
SODIUM SERPL-SCNC: 140 MMOL/L (ref 136–145)
WBC # BLD AUTO: 4.8 K/UL (ref 4.3–11.1)

## 2020-12-04 PROCEDURE — 99284 EMERGENCY DEPT VISIT MOD MDM: CPT

## 2020-12-04 PROCEDURE — 85025 COMPLETE CBC W/AUTO DIFF WBC: CPT

## 2020-12-04 PROCEDURE — 71045 X-RAY EXAM CHEST 1 VIEW: CPT

## 2020-12-04 PROCEDURE — 80053 COMPREHEN METABOLIC PANEL: CPT
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
## 2020-12-04 NOTE — ED NOTES
I have reviewed discharge instructions with the patient. The patient verbalized understanding. Patient left ED via Discharge Method: wheelchair to Home with self. States she is going to call a cab from the waiting room. Opportunity for questions and clarification provided. Patient given 0 scripts. To continue your aftercare when you leave the hospital, you may receive an automated call from our care team to check in on how you are doing. This is a free service and part of our promise to provide the best care and service to meet your aftercare needs.  If you have questions, or wish to unsubscribe from this service please call [phone]. Thank you for Choosing our [facility] Emergency Department.

## 2020-12-04 NOTE — ED TRIAGE NOTES
Pt arrives via EMS from dialysis clinic. Dialysis clinic encouraged pt to go to ER r/t hypoTN which pt states is normal for her after tx. Pt states she was told that the water couldn't be pulled off of her, but states they loaded her with water before calling EMS. BP in route 106/64, HR 68, TEMP 98.1, SpO2 not obtained. Clear lung sounds. Pt is not SOB, not on O2 at home. Pt is wearing gloves and is covered sufficiently. Pt states she does use O2 PRN when she is at dialysis. Pt is not very compliant with her schedule     Attempt line/ labs in triage without success.

## 2021-11-05 NOTE — PROCEDURES
Department of Interventional Radiology  [phone]        Interventional Radiology Brief Procedure Note    Patient: Kaylyn Olivier MRN: [id number]  SSN: [id number]    YOB: 1950  Age: 75 y.o. Sex: female      Date of Procedure: 7/6/2017    Pre-Procedure Diagnosis: ESRD    Post-Procedure Diagnosis: SAME    Procedure(s): Tunneled Central Venous Catheter    Brief Description of Procedure: US, fluoro guided right neck collateral vein tunneled HD catheter placed    Performed By: Timur Mccabe PA-C     Assistants: None    Anesthesia:Lidocaine    Estimated Blood Loss: Less than 10ml    Specimens: None    Implants: Tunneled Hemodialysis Catheter    Findings: catheter tip in right atrium. Right IJ not visualized centrally.      Complications: None    Recommendations: ok to use catheter     Follow Up: Dr. Asael López    Signed By: Timur Mccabe PA-C     July 6, 2017 Detail Level: Zone

## 2022-07-12 NOTE — ANESTHESIA POSTPROCEDURE EVALUATION
Post-Anesthesia Evaluation and Assessment    Patient: Jaziel Dowd MRN: [id number]  SSN: [id number]    YOB: 1950  Age: 70 y.o. Sex: female       Cardiovascular Function/Vital Signs  Visit Vitals    /77 (BP 1 Location: Left arm, BP Patient Position: Head of bed elevated (Comment degrees))    Pulse 87    Temp 36.9 °C (98.5 °F)    Resp 17    SpO2 97%       Patient is status post general anesthesia for Procedure(s):  DEBRIDEMENT OF RIGHT THIGH WOUND/ REMOVAL OF AV GRAFT. Nausea/Vomiting: None    Postoperative hydration reviewed and adequate. Pain:  Pain Scale 1: Numeric (0 - 10) (06/18/18 1733)  Pain Intensity 1: 0 (06/18/18 1733)   Managed    Neurological Status:   Neuro (WDL): Within Defined Limits (06/18/18 1235)  Neuro  Neurologic State: Restless;Eyes open to voice;Drowsy (06/18/18 1733)  Orientation Level: Oriented to person;Oriented to situation (06/18/18 1733)  Cognition: Decreased command following (06/18/18 1733)  Speech: Delayed responses;Clear (06/18/18 1733)  LUE Motor Response: Purposeful (06/18/18 1733)  LLE Motor Response: Purposeful (06/18/18 1733)  RUE Motor Response: Purposeful (06/18/18 1733)  RLE Motor Response: Purposeful (06/18/18 1733)   At baseline    Mental Status and Level of Consciousness: Arousable    Pulmonary Status:   O2 Device: Nasal cannula (06/18/18 5339)   Adequate oxygenation and airway patent    Complications related to anesthesia: None    Post-anesthesia assessment completed.  No concerns    Signed By: Jason Lopez MD     June 18, 2018
1
Attending Attestation (For Attendings USE Only)...

## 2022-07-26 NOTE — PROGRESS NOTES
Patient refused any medications this AM and demanded to speak with  of this hospital.  Patient got upset when this RN told her she did not know how to get hold of that person. She demanded to speak with SW which was relayed to SW. Patient very agitated and states she is leaving she needs the MD right now. Patient explained that MD making rounds and would be here soon. Will monitor. Attending Only

## (undated) DEVICE — WIPE INSTR HIGH ABSORBENT FAST WICKING LINT FREE COUNT 20

## (undated) DEVICE — STOCKINETTE TUBE 6X48 -- MEDICHOICE

## (undated) DEVICE — KENDALL SCD EXPRESS SLEEVES, KNEE LENGTH, MEDIUM: Brand: KENDALL SCD

## (undated) DEVICE — DRAPE TWL SURG 16X26IN BLU ORB04] ALLCARE INC]

## (undated) DEVICE — REM POLYHESIVE ADULT PATIENT RETURN ELECTRODE: Brand: VALLEYLAB

## (undated) DEVICE — SUTURE VCRL SZ 3-0 L18IN ABSRB UD L26MM SH 1/2 CIR J864D

## (undated) DEVICE — INTENDED TO BE USED TO OCCLUDE, RETRACT AND IDENTIFY ARTERIES, VEINS, TENDONS AND NERVES IN SURGICAL PROCEDURES: Brand: STERION®  VESSEL LOOP

## (undated) DEVICE — 2000CC GUARDIAN II: Brand: GUARDIAN

## (undated) DEVICE — CARDINAL HEALTH FLEXIBLE LIGHT HANDLE COVER: Brand: CARDINAL HEALTH

## (undated) DEVICE — BLADE ASSEMB CLP HAIR FINE --

## (undated) DEVICE — GRAFT VASC L40CM ID6MM EPTFE STD WALLED NONRINGED STR GOR: Type: IMPLANTABLE DEVICE | Status: NON-FUNCTIONAL

## (undated) DEVICE — APPLIER CLP L9.375IN APER 2.1MM CLS L3.8MM 20 SM TI CLP

## (undated) DEVICE — SYR 10ML LUER LOK 1/5ML GRAD --

## (undated) DEVICE — SUTURE PERMAHAND SZ 3-0 L18IN NONABSORBABLE BLK SILK BRAID A184H

## (undated) DEVICE — INTENDED FOR TISSUE SEPARATION, AND OTHER PROCEDURES THAT REQUIRE A SHARP SURGICAL BLADE TO PUNCTURE OR CUT.: Brand: BARD-PARKER ® STAINLESS STEEL BLADES

## (undated) DEVICE — DRAPE SHT 3 QTR PROXIMA 53X77 --

## (undated) DEVICE — Device

## (undated) DEVICE — GOWN,REINFORCED,POLY,AURORA,XXLARGE,STR: Brand: MEDLINE

## (undated) DEVICE — SUTURE PROL SZ 6-0 L30IN NONABSORBABLE BLU L9.3MM BV-1 3/8 M8709

## (undated) DEVICE — (D)ADHESIVE TISS HI VISC 1ML -- DISC USE ITEM 346585

## (undated) DEVICE — 3M™ IOBAN™ 2 ANTIMICROBIAL INCISE DRAPE 6650EZ: Brand: IOBAN™ 2

## (undated) DEVICE — SUTURE VCRL SZ 3-0 L27IN ABSRB UD L26MM SH 1/2 CIR J416H

## (undated) DEVICE — GOWN,PREVENTION PLUS,2XL,ST,22/CS: Brand: MEDLINE

## (undated) DEVICE — UNIVERSAL DRAPES: Brand: MEDLINE INDUSTRIES, INC.

## (undated) DEVICE — STERILE LATEX POWDER-FREE SURGICAL GLOVESWITH NITRILE COATING: Brand: PROTEXIS

## (undated) DEVICE — SHEET, DRAPE, SPLIT, STERILE: Brand: MEDLINE

## (undated) DEVICE — BUTTON SWITCH PENCIL BLADE ELECTRODE, HOLSTER: Brand: EDGE

## (undated) DEVICE — (D)PREP SKN CHLRAPRP APPL 26ML -- CONVERT TO ITEM 371833

## (undated) DEVICE — BLADE SCALP SURG BARD-PARK 10 --

## (undated) DEVICE — GEL US 20GM NONIRRITATING OVERWRAPPED FILE PCH TRNSMIT

## (undated) DEVICE — SOLUTION IV 1000ML 0.9% SOD CHL

## (undated) DEVICE — FAN SPRAY KIT: Brand: PULSAVAC®

## (undated) DEVICE — SUTURE VCRL SZ 2-0 L36IN ABSRB UD L36MM CT-1 1/2 CIR J945H

## (undated) DEVICE — SUTURE PROL SZ 5-0 L24IN NONABSORBABLE BLU L9.3MM BV-1 3/8 9702H

## (undated) DEVICE — SMALL BOWL SET-LF: Brand: MEDLINE INDUSTRIES, INC.

## (undated) DEVICE — SUTURE PERMAHAND SZ 2-0 L12X18IN NONABSORBABLE BLK SILK A185H

## (undated) DEVICE — SPONGE LAP 18X18IN STRL -- 5/PK

## (undated) DEVICE — ABSORBENT, WATERPROOF, BACTERIA PROOF FILM DRESSING: Brand: OPSITE POST OP 10X35CM CTN 20

## (undated) DEVICE — SUTURE PROL SZ 5-0 L24IN NONABSORBABLE BLU L13MM C-1 3/8 M8725

## (undated) DEVICE — SYR LR LCK 1ML GRAD NSAF 30ML --

## (undated) DEVICE — DISH PTRI STRL --

## (undated) DEVICE — APPLIER LIG CLP M L11IN TI STR RNG HNDL FOR 20 CLP DISP

## (undated) DEVICE — SUT PROL 6-0 30IN C1 DA BLU --

## (undated) DEVICE — COVER LT HNDL FOR OR SURG LAMP

## (undated) DEVICE — DRAPE,TOP,102X53,STERILE: Brand: MEDLINE

## (undated) DEVICE — SUTURE VCRL SZ 4-0 L27IN ABSRB UD L19MM PS-2 3/8 CIR PRIM J426H

## (undated) DEVICE — SURGICAL PROCEDURE PACK BASIC ST FRANCIS

## (undated) DEVICE — BLADE SCALP SURG BARD-PARK 11 --

## (undated) DEVICE — SUTURE PROL SZ 7-0 L24IN NONABSORBABLE BLU L9.3MM BV-1 3/8 M8702

## (undated) DEVICE — INTENDED USED TO PROTECT, TAG AND HELP LOCATED SUTURES DURING SURGERY: Brand: STERION®SUTURE AID BOOTIES